# Patient Record
Sex: FEMALE | Race: WHITE | URBAN - METROPOLITAN AREA
[De-identification: names, ages, dates, MRNs, and addresses within clinical notes are randomized per-mention and may not be internally consistent; named-entity substitution may affect disease eponyms.]

---

## 2017-01-16 ENCOUNTER — HISTORICAL (OUTPATIENT)
Dept: INFUSION THERAPY | Facility: HOSPITAL | Age: 62
End: 2017-01-16

## 2017-02-02 ENCOUNTER — HISTORICAL (OUTPATIENT)
Dept: HEMATOLOGY/ONCOLOGY | Facility: CLINIC | Age: 62
End: 2017-02-02

## 2017-03-21 ENCOUNTER — HISTORICAL (OUTPATIENT)
Dept: INFUSION THERAPY | Facility: HOSPITAL | Age: 62
End: 2017-03-21

## 2017-05-05 ENCOUNTER — HISTORICAL (OUTPATIENT)
Dept: ADMINISTRATIVE | Facility: HOSPITAL | Age: 62
End: 2017-05-05

## 2017-05-05 ENCOUNTER — HISTORICAL (OUTPATIENT)
Dept: HEMATOLOGY/ONCOLOGY | Facility: CLINIC | Age: 62
End: 2017-05-05

## 2017-05-16 ENCOUNTER — HISTORICAL (OUTPATIENT)
Dept: INFUSION THERAPY | Facility: HOSPITAL | Age: 62
End: 2017-05-16

## 2017-07-18 ENCOUNTER — HISTORICAL (OUTPATIENT)
Dept: INFUSION THERAPY | Facility: HOSPITAL | Age: 62
End: 2017-07-18

## 2017-08-04 ENCOUNTER — HISTORICAL (OUTPATIENT)
Dept: HEMATOLOGY/ONCOLOGY | Facility: CLINIC | Age: 62
End: 2017-08-04

## 2017-08-08 ENCOUNTER — HISTORICAL (OUTPATIENT)
Dept: INFUSION THERAPY | Facility: HOSPITAL | Age: 62
End: 2017-08-08

## 2017-09-19 ENCOUNTER — HISTORICAL (OUTPATIENT)
Dept: INFUSION THERAPY | Facility: HOSPITAL | Age: 62
End: 2017-09-19

## 2017-11-14 ENCOUNTER — HISTORICAL (OUTPATIENT)
Dept: INFUSION THERAPY | Facility: HOSPITAL | Age: 62
End: 2017-11-14

## 2017-11-28 ENCOUNTER — HISTORICAL (OUTPATIENT)
Dept: HEMATOLOGY/ONCOLOGY | Facility: CLINIC | Age: 62
End: 2017-11-28

## 2018-01-09 ENCOUNTER — HISTORICAL (OUTPATIENT)
Dept: INFUSION THERAPY | Facility: HOSPITAL | Age: 63
End: 2018-01-09

## 2018-02-15 ENCOUNTER — HISTORICAL (OUTPATIENT)
Dept: ADMINISTRATIVE | Facility: HOSPITAL | Age: 63
End: 2018-02-15

## 2018-03-20 ENCOUNTER — HISTORICAL (OUTPATIENT)
Dept: HEMATOLOGY/ONCOLOGY | Facility: CLINIC | Age: 63
End: 2018-03-20

## 2018-03-26 ENCOUNTER — HISTORICAL (OUTPATIENT)
Dept: INFUSION THERAPY | Facility: HOSPITAL | Age: 63
End: 2018-03-26

## 2018-03-29 ENCOUNTER — HISTORICAL (OUTPATIENT)
Dept: RADIOLOGY | Facility: HOSPITAL | Age: 63
End: 2018-03-29

## 2018-04-02 ENCOUNTER — HISTORICAL (OUTPATIENT)
Dept: INFUSION THERAPY | Facility: HOSPITAL | Age: 63
End: 2018-04-02

## 2018-04-03 ENCOUNTER — HISTORICAL (OUTPATIENT)
Dept: INFUSION THERAPY | Facility: HOSPITAL | Age: 63
End: 2018-04-03

## 2018-04-05 ENCOUNTER — HISTORICAL (OUTPATIENT)
Dept: INFUSION THERAPY | Facility: HOSPITAL | Age: 63
End: 2018-04-05

## 2018-04-10 ENCOUNTER — HISTORICAL (OUTPATIENT)
Dept: HEMATOLOGY/ONCOLOGY | Facility: CLINIC | Age: 63
End: 2018-04-10

## 2018-04-17 ENCOUNTER — HISTORICAL (OUTPATIENT)
Dept: INFUSION THERAPY | Facility: HOSPITAL | Age: 63
End: 2018-04-17

## 2018-04-19 ENCOUNTER — HISTORICAL (OUTPATIENT)
Dept: INFUSION THERAPY | Facility: HOSPITAL | Age: 63
End: 2018-04-19

## 2018-04-24 ENCOUNTER — HISTORICAL (OUTPATIENT)
Dept: HEMATOLOGY/ONCOLOGY | Facility: CLINIC | Age: 63
End: 2018-04-24

## 2018-05-01 ENCOUNTER — HISTORICAL (OUTPATIENT)
Dept: INFUSION THERAPY | Facility: HOSPITAL | Age: 63
End: 2018-05-01

## 2018-05-03 ENCOUNTER — HISTORICAL (OUTPATIENT)
Dept: INFUSION THERAPY | Facility: HOSPITAL | Age: 63
End: 2018-05-03

## 2018-05-08 ENCOUNTER — HISTORICAL (OUTPATIENT)
Dept: HEMATOLOGY/ONCOLOGY | Facility: CLINIC | Age: 63
End: 2018-05-08

## 2018-05-10 ENCOUNTER — HISTORICAL (OUTPATIENT)
Dept: INFUSION THERAPY | Facility: HOSPITAL | Age: 63
End: 2018-05-10

## 2018-05-11 ENCOUNTER — HISTORICAL (OUTPATIENT)
Dept: INFUSION THERAPY | Facility: HOSPITAL | Age: 63
End: 2018-05-11

## 2018-05-22 ENCOUNTER — HISTORICAL (OUTPATIENT)
Dept: INFUSION THERAPY | Facility: HOSPITAL | Age: 63
End: 2018-05-22

## 2018-05-24 ENCOUNTER — HISTORICAL (OUTPATIENT)
Dept: INFUSION THERAPY | Facility: HOSPITAL | Age: 63
End: 2018-05-24

## 2018-05-29 ENCOUNTER — HISTORICAL (OUTPATIENT)
Dept: HEMATOLOGY/ONCOLOGY | Facility: CLINIC | Age: 63
End: 2018-05-29

## 2018-06-12 ENCOUNTER — HISTORICAL (OUTPATIENT)
Dept: INFUSION THERAPY | Facility: HOSPITAL | Age: 63
End: 2018-06-12

## 2018-06-14 ENCOUNTER — HISTORICAL (OUTPATIENT)
Dept: INFUSION THERAPY | Facility: HOSPITAL | Age: 63
End: 2018-06-14

## 2018-06-15 ENCOUNTER — HISTORICAL (OUTPATIENT)
Dept: INFUSION THERAPY | Facility: HOSPITAL | Age: 63
End: 2018-06-15

## 2018-06-19 ENCOUNTER — HISTORICAL (OUTPATIENT)
Dept: HEMATOLOGY/ONCOLOGY | Facility: CLINIC | Age: 63
End: 2018-06-19

## 2018-06-25 ENCOUNTER — HISTORICAL (OUTPATIENT)
Dept: HEMATOLOGY/ONCOLOGY | Facility: CLINIC | Age: 63
End: 2018-06-25

## 2018-07-03 ENCOUNTER — HISTORICAL (OUTPATIENT)
Dept: INFUSION THERAPY | Facility: HOSPITAL | Age: 63
End: 2018-07-03

## 2018-07-05 ENCOUNTER — HISTORICAL (OUTPATIENT)
Dept: INFUSION THERAPY | Facility: HOSPITAL | Age: 63
End: 2018-07-05

## 2018-07-10 ENCOUNTER — HISTORICAL (OUTPATIENT)
Dept: HEMATOLOGY/ONCOLOGY | Facility: CLINIC | Age: 63
End: 2018-07-10

## 2018-07-16 ENCOUNTER — HISTORICAL (OUTPATIENT)
Dept: HEMATOLOGY/ONCOLOGY | Facility: CLINIC | Age: 63
End: 2018-07-16

## 2018-07-20 ENCOUNTER — HISTORICAL (OUTPATIENT)
Dept: RADIOLOGY | Facility: HOSPITAL | Age: 63
End: 2018-07-20

## 2018-07-24 ENCOUNTER — HISTORICAL (OUTPATIENT)
Dept: INFUSION THERAPY | Facility: HOSPITAL | Age: 63
End: 2018-07-24

## 2018-07-26 ENCOUNTER — HISTORICAL (OUTPATIENT)
Dept: INFUSION THERAPY | Facility: HOSPITAL | Age: 63
End: 2018-07-26

## 2018-07-31 ENCOUNTER — HISTORICAL (OUTPATIENT)
Dept: HEMATOLOGY/ONCOLOGY | Facility: CLINIC | Age: 63
End: 2018-07-31

## 2018-08-06 ENCOUNTER — HISTORICAL (OUTPATIENT)
Dept: HEMATOLOGY/ONCOLOGY | Facility: CLINIC | Age: 63
End: 2018-08-06

## 2018-08-14 ENCOUNTER — HISTORICAL (OUTPATIENT)
Dept: INFUSION THERAPY | Facility: HOSPITAL | Age: 63
End: 2018-08-14

## 2018-08-16 ENCOUNTER — HISTORICAL (OUTPATIENT)
Dept: INFUSION THERAPY | Facility: HOSPITAL | Age: 63
End: 2018-08-16

## 2018-08-21 ENCOUNTER — HISTORICAL (OUTPATIENT)
Dept: HEMATOLOGY/ONCOLOGY | Facility: CLINIC | Age: 63
End: 2018-08-21

## 2018-08-28 ENCOUNTER — HISTORICAL (OUTPATIENT)
Dept: HEMATOLOGY/ONCOLOGY | Facility: CLINIC | Age: 63
End: 2018-08-28

## 2018-09-05 ENCOUNTER — HISTORICAL (OUTPATIENT)
Dept: INFUSION THERAPY | Facility: HOSPITAL | Age: 63
End: 2018-09-05

## 2018-09-07 ENCOUNTER — HISTORICAL (OUTPATIENT)
Dept: INFUSION THERAPY | Facility: HOSPITAL | Age: 63
End: 2018-09-07

## 2018-09-12 ENCOUNTER — HISTORICAL (OUTPATIENT)
Dept: HEMATOLOGY/ONCOLOGY | Facility: CLINIC | Age: 63
End: 2018-09-12

## 2018-09-18 ENCOUNTER — HISTORICAL (OUTPATIENT)
Dept: HEMATOLOGY/ONCOLOGY | Facility: CLINIC | Age: 63
End: 2018-09-18

## 2018-09-26 ENCOUNTER — HISTORICAL (OUTPATIENT)
Dept: INFUSION THERAPY | Facility: HOSPITAL | Age: 63
End: 2018-09-26

## 2018-09-28 ENCOUNTER — HISTORICAL (OUTPATIENT)
Dept: INFUSION THERAPY | Facility: HOSPITAL | Age: 63
End: 2018-09-28

## 2018-10-03 ENCOUNTER — HISTORICAL (OUTPATIENT)
Dept: HEMATOLOGY/ONCOLOGY | Facility: CLINIC | Age: 63
End: 2018-10-03

## 2018-10-10 ENCOUNTER — HISTORICAL (OUTPATIENT)
Dept: HEMATOLOGY/ONCOLOGY | Facility: CLINIC | Age: 63
End: 2018-10-10

## 2018-10-17 ENCOUNTER — HISTORICAL (OUTPATIENT)
Dept: HEMATOLOGY/ONCOLOGY | Facility: CLINIC | Age: 63
End: 2018-10-17

## 2018-10-24 ENCOUNTER — HISTORICAL (OUTPATIENT)
Dept: INFUSION THERAPY | Facility: HOSPITAL | Age: 63
End: 2018-10-24

## 2018-10-26 ENCOUNTER — HISTORICAL (OUTPATIENT)
Dept: INFUSION THERAPY | Facility: HOSPITAL | Age: 63
End: 2018-10-26

## 2018-10-31 ENCOUNTER — HISTORICAL (OUTPATIENT)
Dept: HEMATOLOGY/ONCOLOGY | Facility: CLINIC | Age: 63
End: 2018-10-31

## 2018-11-06 ENCOUNTER — HISTORICAL (OUTPATIENT)
Dept: HEMATOLOGY/ONCOLOGY | Facility: CLINIC | Age: 63
End: 2018-11-06

## 2018-11-14 ENCOUNTER — HISTORICAL (OUTPATIENT)
Dept: INFUSION THERAPY | Facility: HOSPITAL | Age: 63
End: 2018-11-14

## 2018-11-16 ENCOUNTER — HISTORICAL (OUTPATIENT)
Dept: INFUSION THERAPY | Facility: HOSPITAL | Age: 63
End: 2018-11-16

## 2018-11-21 ENCOUNTER — HISTORICAL (OUTPATIENT)
Dept: HEMATOLOGY/ONCOLOGY | Facility: CLINIC | Age: 63
End: 2018-11-21

## 2018-11-28 ENCOUNTER — HISTORICAL (OUTPATIENT)
Dept: HEMATOLOGY/ONCOLOGY | Facility: CLINIC | Age: 63
End: 2018-11-28

## 2018-11-30 ENCOUNTER — HISTORICAL (OUTPATIENT)
Dept: RADIOLOGY | Facility: HOSPITAL | Age: 63
End: 2018-11-30

## 2018-12-05 ENCOUNTER — HISTORICAL (OUTPATIENT)
Dept: INFUSION THERAPY | Facility: HOSPITAL | Age: 63
End: 2018-12-05

## 2018-12-07 ENCOUNTER — HISTORICAL (OUTPATIENT)
Dept: INFUSION THERAPY | Facility: HOSPITAL | Age: 63
End: 2018-12-07

## 2018-12-12 ENCOUNTER — HISTORICAL (OUTPATIENT)
Dept: HEMATOLOGY/ONCOLOGY | Facility: CLINIC | Age: 63
End: 2018-12-12

## 2018-12-19 ENCOUNTER — HISTORICAL (OUTPATIENT)
Dept: HEMATOLOGY/ONCOLOGY | Facility: CLINIC | Age: 63
End: 2018-12-19

## 2018-12-26 ENCOUNTER — HISTORICAL (OUTPATIENT)
Dept: HEMATOLOGY/ONCOLOGY | Facility: CLINIC | Age: 63
End: 2018-12-26

## 2019-01-02 ENCOUNTER — HISTORICAL (OUTPATIENT)
Dept: INFUSION THERAPY | Facility: HOSPITAL | Age: 64
End: 2019-01-02

## 2019-01-03 ENCOUNTER — HISTORICAL (OUTPATIENT)
Dept: INFUSION THERAPY | Facility: HOSPITAL | Age: 64
End: 2019-01-03

## 2019-01-05 ENCOUNTER — HISTORICAL (OUTPATIENT)
Dept: INFUSION THERAPY | Facility: HOSPITAL | Age: 64
End: 2019-01-05

## 2019-01-09 ENCOUNTER — HISTORICAL (OUTPATIENT)
Dept: HEMATOLOGY/ONCOLOGY | Facility: CLINIC | Age: 64
End: 2019-01-09

## 2019-01-17 ENCOUNTER — HISTORICAL (OUTPATIENT)
Dept: HEMATOLOGY/ONCOLOGY | Facility: CLINIC | Age: 64
End: 2019-01-17

## 2019-01-21 ENCOUNTER — HISTORICAL (OUTPATIENT)
Dept: ADMINISTRATIVE | Facility: HOSPITAL | Age: 64
End: 2019-01-21

## 2019-01-21 LAB
ABS NEUT (OLG): 6.95 X10(3)/MCL (ref 2.1–9.2)
ANION GAP SERPL CALC-SCNC: 15 MMOL/L
BASOPHILS # BLD AUTO: 0 X10(3)/MCL (ref 0–0.2)
BASOPHILS NFR BLD AUTO: 0.3 %
BUN SERPL-MCNC: 18 MG/DL (ref 8–26)
CHLORIDE SERPL-SCNC: 104 MMOL/L (ref 98–109)
CREAT SERPL-MCNC: 1 MG/DL (ref 0.6–1.3)
EOSINOPHIL # BLD AUTO: 0.1 X10(3)/MCL (ref 0–0.9)
EOSINOPHIL NFR BLD AUTO: 0.8 %
ERYTHROCYTE [DISTWIDTH] IN BLOOD BY AUTOMATED COUNT: 15 % (ref 11.5–17)
GLUCOSE SERPL-MCNC: 97 MG/DL (ref 70–105)
HCT VFR BLD AUTO: 43.4 % (ref 37–47)
HCT VFR BLD CALC: 43 % (ref 38–51)
HGB BLD-MCNC: 13.6 GM/DL (ref 12–16)
HGB BLD-MCNC: 14.6 MG/DL (ref 12–17)
LYMPHOCYTES # BLD AUTO: 2.5 X10(3)/MCL (ref 0.6–4.6)
LYMPHOCYTES NFR BLD AUTO: 24.3 %
MCH RBC QN AUTO: 32.2 PG (ref 27–31)
MCHC RBC AUTO-ENTMCNC: 31.3 GM/DL (ref 33–36)
MCV RBC AUTO: 102.8 FL (ref 80–94)
MONOCYTES # BLD AUTO: 0.7 X10(3)/MCL (ref 0.1–1.3)
MONOCYTES NFR BLD AUTO: 6.5 %
NEUTROPHILS # BLD AUTO: 7 X10(3)/MCL (ref 2.1–9.2)
NEUTROPHILS NFR BLD AUTO: 67.4 %
PLATELET # BLD AUTO: 245 X10(3)/MCL (ref 130–400)
PMV BLD AUTO: 9.3 FL (ref 9.4–12.4)
POC IONIZED CALCIUM: 1.12 MMOL/L (ref 1.12–1.32)
POC TCO2: 24 MMOL/L (ref 24–29)
POTASSIUM BLD-SCNC: 4.2 MMOL/L (ref 3.5–4.9)
RBC # BLD AUTO: 4.22 X10(6)/MCL (ref 4.2–5.4)
SODIUM BLD-SCNC: 137 MMOL/L (ref 138–146)
WBC # SPEC AUTO: 10.3 X10(3)/MCL (ref 4.5–11.5)

## 2019-01-23 ENCOUNTER — HISTORICAL (OUTPATIENT)
Dept: INFUSION THERAPY | Facility: HOSPITAL | Age: 64
End: 2019-01-23

## 2019-01-25 ENCOUNTER — HISTORICAL (OUTPATIENT)
Dept: INFUSION THERAPY | Facility: HOSPITAL | Age: 64
End: 2019-01-25

## 2019-01-30 ENCOUNTER — HISTORICAL (OUTPATIENT)
Dept: HEMATOLOGY/ONCOLOGY | Facility: CLINIC | Age: 64
End: 2019-01-30

## 2019-01-30 LAB
ABS NEUT (OLG): 3.54 X10(3)/MCL (ref 2.1–9.2)
ALBUMIN SERPL-MCNC: 3.3 GM/DL (ref 3.4–5)
ALBUMIN/GLOB SERPL: 1.1 RATIO (ref 1.1–2)
ALP SERPL-CCNC: 67 UNIT/L (ref 38–126)
ALT SERPL-CCNC: 17 UNIT/L (ref 12–78)
AST SERPL-CCNC: 16 UNIT/L (ref 15–37)
BASOPHILS # BLD AUTO: 0 X10(3)/MCL (ref 0–0.2)
BASOPHILS NFR BLD AUTO: 0.6 %
BILIRUB SERPL-MCNC: 0.3 MG/DL (ref 0.2–1)
BILIRUBIN DIRECT+TOT PNL SERPL-MCNC: 0.1 MG/DL (ref 0–0.5)
BILIRUBIN DIRECT+TOT PNL SERPL-MCNC: 0.2 MG/DL (ref 0–0.8)
BUN SERPL-MCNC: 20 MG/DL (ref 7–18)
CALCIUM SERPL-MCNC: 9 MG/DL (ref 8.5–10.1)
CHLORIDE SERPL-SCNC: 104 MMOL/L (ref 98–107)
CO2 SERPL-SCNC: 27 MMOL/L (ref 21–32)
CREAT SERPL-MCNC: 1.11 MG/DL (ref 0.55–1.02)
EOSINOPHIL # BLD AUTO: 0.1 X10(3)/MCL (ref 0–0.9)
EOSINOPHIL NFR BLD AUTO: 1.7 %
ERYTHROCYTE [DISTWIDTH] IN BLOOD BY AUTOMATED COUNT: 14.4 % (ref 11.5–17)
GLOBULIN SER-MCNC: 3.1 GM/DL (ref 2.4–3.5)
GLUCOSE SERPL-MCNC: 89 MG/DL (ref 74–106)
HCT VFR BLD AUTO: 41.4 % (ref 37–47)
HGB BLD-MCNC: 12.9 GM/DL (ref 12–16)
LYMPHOCYTES # BLD AUTO: 1.4 X10(3)/MCL (ref 0.6–4.6)
LYMPHOCYTES NFR BLD AUTO: 26.4 %
MCH RBC QN AUTO: 32.1 PG (ref 27–31)
MCHC RBC AUTO-ENTMCNC: 31.2 GM/DL (ref 33–36)
MCV RBC AUTO: 103 FL (ref 80–94)
MONOCYTES # BLD AUTO: 0.2 X10(3)/MCL (ref 0.1–1.3)
MONOCYTES NFR BLD AUTO: 4.3 %
NEUTROPHILS # BLD AUTO: 3.5 X10(3)/MCL (ref 2.1–9.2)
NEUTROPHILS NFR BLD AUTO: 66.6 %
PLATELET # BLD AUTO: 203 X10(3)/MCL (ref 130–400)
PMV BLD AUTO: 9.2 FL (ref 9.4–12.4)
POTASSIUM SERPL-SCNC: 4.1 MMOL/L (ref 3.5–5.1)
PROT SERPL-MCNC: 6.4 GM/DL (ref 6.4–8.2)
RBC # BLD AUTO: 4.02 X10(6)/MCL (ref 4.2–5.4)
SODIUM SERPL-SCNC: 140 MMOL/L (ref 136–145)
WBC # SPEC AUTO: 5.3 X10(3)/MCL (ref 4.5–11.5)

## 2019-02-04 ENCOUNTER — HISTORICAL (OUTPATIENT)
Dept: HEMATOLOGY/ONCOLOGY | Facility: CLINIC | Age: 64
End: 2019-02-04

## 2019-02-04 LAB
ABS NEUT (OLG): 3.56 X10(3)/MCL (ref 2.1–9.2)
ALBUMIN SERPL-MCNC: 3.5 GM/DL (ref 3.4–5)
ALBUMIN/GLOB SERPL: 1.1 RATIO (ref 1.1–2)
ALP SERPL-CCNC: 57 UNIT/L (ref 38–126)
ALT SERPL-CCNC: 18 UNIT/L (ref 12–78)
ANION GAP SERPL CALC-SCNC: 16 MMOL/L
AST SERPL-CCNC: 20 UNIT/L (ref 15–37)
BASOPHILS # BLD AUTO: 0 X10(3)/MCL (ref 0–0.2)
BASOPHILS NFR BLD AUTO: 0.2 %
BILIRUB SERPL-MCNC: 0.3 MG/DL (ref 0.2–1)
BILIRUBIN DIRECT+TOT PNL SERPL-MCNC: 0.1 MG/DL (ref 0–0.5)
BILIRUBIN DIRECT+TOT PNL SERPL-MCNC: 0.2 MG/DL (ref 0–0.8)
BUN SERPL-MCNC: 18 MG/DL (ref 7–18)
BUN SERPL-MCNC: 19 MG/DL (ref 8–26)
CALCIUM SERPL-MCNC: 8.8 MG/DL (ref 8.5–10.1)
CEA SERPL-MCNC: 5.1 NG/ML (ref 0–3)
CHLORIDE SERPL-SCNC: 102 MMOL/L (ref 98–109)
CHLORIDE SERPL-SCNC: 105 MMOL/L (ref 98–107)
CO2 SERPL-SCNC: 28 MMOL/L (ref 21–32)
CREAT SERPL-MCNC: 1.1 MG/DL (ref 0.6–1.3)
CREAT SERPL-MCNC: 1.14 MG/DL (ref 0.55–1.02)
EOSINOPHIL # BLD AUTO: 0.1 X10(3)/MCL (ref 0–0.9)
EOSINOPHIL NFR BLD AUTO: 1.2 %
ERYTHROCYTE [DISTWIDTH] IN BLOOD BY AUTOMATED COUNT: 15.2 % (ref 11.5–17)
GLOBULIN SER-MCNC: 3.1 GM/DL (ref 2.4–3.5)
GLUCOSE SERPL-MCNC: 110 MG/DL (ref 74–106)
GLUCOSE SERPL-MCNC: 114 MG/DL (ref 70–105)
HCT VFR BLD AUTO: 39.6 % (ref 37–47)
HCT VFR BLD CALC: 38 % (ref 38–51)
HGB BLD-MCNC: 12.3 GM/DL (ref 12–16)
HGB BLD-MCNC: 12.9 MG/DL (ref 12–17)
LYMPHOCYTES # BLD AUTO: 1.8 X10(3)/MCL (ref 0.6–4.6)
LYMPHOCYTES NFR BLD AUTO: 31 %
MCH RBC QN AUTO: 31.8 PG (ref 27–31)
MCHC RBC AUTO-ENTMCNC: 31.1 GM/DL (ref 33–36)
MCV RBC AUTO: 102.3 FL (ref 80–94)
MONOCYTES # BLD AUTO: 0.3 X10(3)/MCL (ref 0.1–1.3)
MONOCYTES NFR BLD AUTO: 5.7 %
NEUTROPHILS # BLD AUTO: 3.6 X10(3)/MCL (ref 2.1–9.2)
NEUTROPHILS NFR BLD AUTO: 61.9 %
PLATELET # BLD AUTO: 273 X10(3)/MCL (ref 130–400)
PMV BLD AUTO: 8.9 FL (ref 9.4–12.4)
POC IONIZED CALCIUM: 1.22 MMOL/L (ref 1.12–1.32)
POC TCO2: 27 MMOL/L (ref 24–29)
POTASSIUM BLD-SCNC: 4.1 MMOL/L (ref 3.5–4.9)
POTASSIUM SERPL-SCNC: 4.2 MMOL/L (ref 3.5–5.1)
PROT SERPL-MCNC: 6.6 GM/DL (ref 6.4–8.2)
RBC # BLD AUTO: 3.87 X10(6)/MCL (ref 4.2–5.4)
SODIUM BLD-SCNC: 140 MMOL/L (ref 138–146)
SODIUM SERPL-SCNC: 140 MMOL/L (ref 136–145)
WBC # SPEC AUTO: 5.8 X10(3)/MCL (ref 4.5–11.5)

## 2019-02-11 ENCOUNTER — HISTORICAL (OUTPATIENT)
Dept: ADMINISTRATIVE | Facility: HOSPITAL | Age: 64
End: 2019-02-11

## 2019-02-11 LAB
ABS NEUT (OLG): 2.12 X10(3)/MCL (ref 2.1–9.2)
ANION GAP SERPL CALC-SCNC: 13 MMOL/L
BASOPHILS # BLD AUTO: 0 X10(3)/MCL (ref 0–0.2)
BASOPHILS NFR BLD AUTO: 0.7 %
BUN SERPL-MCNC: 21 MG/DL (ref 8–26)
CHLORIDE SERPL-SCNC: 103 MMOL/L (ref 98–109)
CREAT SERPL-MCNC: 1.1 MG/DL (ref 0.6–1.3)
EOSINOPHIL # BLD AUTO: 0.1 X10(3)/MCL (ref 0–0.9)
EOSINOPHIL NFR BLD AUTO: 1.9 %
ERYTHROCYTE [DISTWIDTH] IN BLOOD BY AUTOMATED COUNT: 15.2 % (ref 11.5–17)
GLUCOSE SERPL-MCNC: 96 MG/DL (ref 70–105)
HCT VFR BLD AUTO: 40.8 % (ref 37–47)
HCT VFR BLD CALC: 40 % (ref 38–51)
HGB BLD-MCNC: 12.9 GM/DL (ref 12–16)
HGB BLD-MCNC: 13.6 MG/DL (ref 12–17)
LYMPHOCYTES # BLD AUTO: 1.4 X10(3)/MCL (ref 0.6–4.6)
LYMPHOCYTES NFR BLD AUTO: 34.2 %
MCH RBC QN AUTO: 32.3 PG (ref 27–31)
MCHC RBC AUTO-ENTMCNC: 31.6 GM/DL (ref 33–36)
MCV RBC AUTO: 102 FL (ref 80–94)
MONOCYTES # BLD AUTO: 0.5 X10(3)/MCL (ref 0.1–1.3)
MONOCYTES NFR BLD AUTO: 12 %
NEUTROPHILS # BLD AUTO: 2.1 X10(3)/MCL (ref 2.1–9.2)
NEUTROPHILS NFR BLD AUTO: 51.2 %
PLATELET # BLD AUTO: 256 X10(3)/MCL (ref 130–400)
PMV BLD AUTO: 9 FL (ref 9.4–12.4)
POC IONIZED CALCIUM: 1.18 MMOL/L (ref 1.12–1.32)
POC TCO2: 27 MMOL/L (ref 24–29)
POTASSIUM BLD-SCNC: 4.5 MMOL/L (ref 3.5–4.9)
RBC # BLD AUTO: 4 X10(6)/MCL (ref 4.2–5.4)
SODIUM BLD-SCNC: 138 MMOL/L (ref 138–146)
WBC # SPEC AUTO: 4.2 X10(3)/MCL (ref 4.5–11.5)

## 2019-02-13 ENCOUNTER — HISTORICAL (OUTPATIENT)
Dept: INFUSION THERAPY | Facility: HOSPITAL | Age: 64
End: 2019-02-13

## 2019-02-15 ENCOUNTER — HISTORICAL (OUTPATIENT)
Dept: INFUSION THERAPY | Facility: HOSPITAL | Age: 64
End: 2019-02-15

## 2019-02-20 ENCOUNTER — HISTORICAL (OUTPATIENT)
Dept: HEMATOLOGY/ONCOLOGY | Facility: CLINIC | Age: 64
End: 2019-02-20

## 2019-02-20 LAB
ABS NEUT (OLG): 9.52 X10(3)/MCL (ref 2.1–9.2)
ALBUMIN SERPL-MCNC: 3.3 GM/DL (ref 3.4–5)
ALBUMIN/GLOB SERPL: 1.1 RATIO (ref 1.1–2)
ALP SERPL-CCNC: 88 UNIT/L (ref 38–126)
ALT SERPL-CCNC: 29 UNIT/L (ref 12–78)
AST SERPL-CCNC: 15 UNIT/L (ref 15–37)
BASOPHILS # BLD AUTO: 0 X10(3)/MCL (ref 0–0.2)
BASOPHILS NFR BLD AUTO: 0.2 %
BILIRUB SERPL-MCNC: 0.4 MG/DL (ref 0.2–1)
BILIRUBIN DIRECT+TOT PNL SERPL-MCNC: 0.1 MG/DL (ref 0–0.5)
BILIRUBIN DIRECT+TOT PNL SERPL-MCNC: 0.3 MG/DL (ref 0–0.8)
BUN SERPL-MCNC: 19 MG/DL (ref 7–18)
CALCIUM SERPL-MCNC: 8.7 MG/DL (ref 8.5–10.1)
CHLORIDE SERPL-SCNC: 106 MMOL/L (ref 98–107)
CO2 SERPL-SCNC: 27 MMOL/L (ref 21–32)
CREAT SERPL-MCNC: 0.95 MG/DL (ref 0.55–1.02)
EOSINOPHIL # BLD AUTO: 0.2 X10(3)/MCL (ref 0–0.9)
EOSINOPHIL NFR BLD AUTO: 1.3 %
ERYTHROCYTE [DISTWIDTH] IN BLOOD BY AUTOMATED COUNT: 15.1 % (ref 11.5–17)
GLOBULIN SER-MCNC: 3 GM/DL (ref 2.4–3.5)
GLUCOSE SERPL-MCNC: 91 MG/DL (ref 74–106)
HCT VFR BLD AUTO: 39.7 % (ref 37–47)
HGB BLD-MCNC: 12.4 GM/DL (ref 12–16)
LYMPHOCYTES # BLD AUTO: 1.8 X10(3)/MCL (ref 0.6–4.6)
LYMPHOCYTES NFR BLD AUTO: 14.2 %
MCH RBC QN AUTO: 32.2 PG (ref 27–31)
MCHC RBC AUTO-ENTMCNC: 31.2 GM/DL (ref 33–36)
MCV RBC AUTO: 103.1 FL (ref 80–94)
MONOCYTES # BLD AUTO: 0.8 X10(3)/MCL (ref 0.1–1.3)
MONOCYTES NFR BLD AUTO: 6.2 %
NEUTROPHILS # BLD AUTO: 9.5 X10(3)/MCL (ref 2.1–9.2)
NEUTROPHILS NFR BLD AUTO: 77.2 %
PLATELET # BLD AUTO: 185 X10(3)/MCL (ref 130–400)
PMV BLD AUTO: 9.3 FL (ref 9.4–12.4)
POTASSIUM SERPL-SCNC: 4.2 MMOL/L (ref 3.5–5.1)
PROT SERPL-MCNC: 6.3 GM/DL (ref 6.4–8.2)
RBC # BLD AUTO: 3.85 X10(6)/MCL (ref 4.2–5.4)
SODIUM SERPL-SCNC: 140 MMOL/L (ref 136–145)
WBC # SPEC AUTO: 12.3 X10(3)/MCL (ref 4.5–11.5)

## 2019-02-26 ENCOUNTER — HISTORICAL (OUTPATIENT)
Dept: HEMATOLOGY/ONCOLOGY | Facility: CLINIC | Age: 64
End: 2019-02-26

## 2019-02-26 LAB
ABS NEUT (OLG): 7.59 X10(3)/MCL (ref 2.1–9.2)
ALBUMIN SERPL-MCNC: 3.4 GM/DL (ref 3.4–5)
ALBUMIN/GLOB SERPL: 1 RATIO (ref 1.1–2)
ALP SERPL-CCNC: 86 UNIT/L (ref 38–126)
ALT SERPL-CCNC: 21 UNIT/L (ref 12–78)
AST SERPL-CCNC: 20 UNIT/L (ref 15–37)
BASOPHILS # BLD AUTO: 0 X10(3)/MCL (ref 0–0.2)
BASOPHILS NFR BLD AUTO: 0.4 %
BILIRUB SERPL-MCNC: 0.2 MG/DL (ref 0.2–1)
BILIRUBIN DIRECT+TOT PNL SERPL-MCNC: 0.1 MG/DL (ref 0–0.5)
BILIRUBIN DIRECT+TOT PNL SERPL-MCNC: 0.1 MG/DL (ref 0–0.8)
BUN SERPL-MCNC: 21 MG/DL (ref 7–18)
CALCIUM SERPL-MCNC: 9 MG/DL (ref 8.5–10.1)
CEA SERPL-MCNC: 5.4 NG/ML (ref 0–3)
CHLORIDE SERPL-SCNC: 105 MMOL/L (ref 98–107)
CO2 SERPL-SCNC: 26 MMOL/L (ref 21–32)
CREAT SERPL-MCNC: 1.08 MG/DL (ref 0.55–1.02)
EOSINOPHIL # BLD AUTO: 0.1 X10(3)/MCL (ref 0–0.9)
EOSINOPHIL NFR BLD AUTO: 0.9 %
ERYTHROCYTE [DISTWIDTH] IN BLOOD BY AUTOMATED COUNT: 15.5 % (ref 11.5–17)
GLOBULIN SER-MCNC: 3.3 GM/DL (ref 2.4–3.5)
GLUCOSE SERPL-MCNC: 85 MG/DL (ref 74–106)
HCT VFR BLD AUTO: 40.3 % (ref 37–47)
HGB BLD-MCNC: 12.5 GM/DL (ref 12–16)
LYMPHOCYTES # BLD AUTO: 2 X10(3)/MCL (ref 0.6–4.6)
LYMPHOCYTES NFR BLD AUTO: 19.2 %
MCH RBC QN AUTO: 32.1 PG (ref 27–31)
MCHC RBC AUTO-ENTMCNC: 31 GM/DL (ref 33–36)
MCV RBC AUTO: 103.6 FL (ref 80–94)
MONOCYTES # BLD AUTO: 0.6 X10(3)/MCL (ref 0.1–1.3)
MONOCYTES NFR BLD AUTO: 6.1 %
NEUTROPHILS # BLD AUTO: 7.6 X10(3)/MCL (ref 2.1–9.2)
NEUTROPHILS NFR BLD AUTO: 72.9 %
PLATELET # BLD AUTO: 222 X10(3)/MCL (ref 130–400)
PMV BLD AUTO: 9.4 FL (ref 9.4–12.4)
POTASSIUM SERPL-SCNC: 4.6 MMOL/L (ref 3.5–5.1)
PROT SERPL-MCNC: 6.7 GM/DL (ref 6.4–8.2)
RBC # BLD AUTO: 3.89 X10(6)/MCL (ref 4.2–5.4)
SODIUM SERPL-SCNC: 139 MMOL/L (ref 136–145)
WBC # SPEC AUTO: 10.4 X10(3)/MCL (ref 4.5–11.5)

## 2019-03-04 ENCOUNTER — HISTORICAL (OUTPATIENT)
Dept: ADMINISTRATIVE | Facility: HOSPITAL | Age: 64
End: 2019-03-04

## 2019-03-04 LAB
ABS NEUT (OLG): 7.21 X10(3)/MCL (ref 2.1–9.2)
ANION GAP SERPL CALC-SCNC: 16 MMOL/L
BASOPHILS # BLD AUTO: 0 X10(3)/MCL (ref 0–0.2)
BASOPHILS NFR BLD AUTO: 0.3 %
BUN SERPL-MCNC: 21 MG/DL (ref 8–26)
CEA SERPL-MCNC: 6.4 NG/ML (ref 0–3)
CHLORIDE SERPL-SCNC: 104 MMOL/L (ref 98–109)
CREAT SERPL-MCNC: 1 MG/DL (ref 0.6–1.3)
EOSINOPHIL # BLD AUTO: 0.1 X10(3)/MCL (ref 0–0.9)
EOSINOPHIL NFR BLD AUTO: 1 %
ERYTHROCYTE [DISTWIDTH] IN BLOOD BY AUTOMATED COUNT: 15.7 % (ref 11.5–17)
GLUCOSE SERPL-MCNC: 91 MG/DL (ref 70–105)
HCT VFR BLD AUTO: 41.4 % (ref 37–47)
HCT VFR BLD CALC: 39 % (ref 38–51)
HGB BLD-MCNC: 13.1 GM/DL (ref 12–16)
HGB BLD-MCNC: 13.3 MG/DL (ref 12–17)
LYMPHOCYTES # BLD AUTO: 2 X10(3)/MCL (ref 0.6–4.6)
LYMPHOCYTES NFR BLD AUTO: 20.1 %
MCH RBC QN AUTO: 32.3 PG (ref 27–31)
MCHC RBC AUTO-ENTMCNC: 31.6 GM/DL (ref 33–36)
MCV RBC AUTO: 102.2 FL (ref 80–94)
MONOCYTES # BLD AUTO: 0.6 X10(3)/MCL (ref 0.1–1.3)
MONOCYTES NFR BLD AUTO: 6.3 %
NEUTROPHILS # BLD AUTO: 7.2 X10(3)/MCL (ref 2.1–9.2)
NEUTROPHILS NFR BLD AUTO: 71.5 %
PLATELET # BLD AUTO: 240 X10(3)/MCL (ref 130–400)
PMV BLD AUTO: 9.2 FL (ref 9.4–12.4)
POC IONIZED CALCIUM: 1.17 MMOL/L (ref 1.12–1.32)
POC TCO2: 23 MMOL/L (ref 24–29)
POTASSIUM BLD-SCNC: 4.5 MMOL/L (ref 3.5–4.9)
RBC # BLD AUTO: 4.05 X10(6)/MCL (ref 4.2–5.4)
SODIUM BLD-SCNC: 138 MMOL/L (ref 138–146)
WBC # SPEC AUTO: 10.1 X10(3)/MCL (ref 4.5–11.5)

## 2019-03-06 ENCOUNTER — HISTORICAL (OUTPATIENT)
Dept: INFUSION THERAPY | Facility: HOSPITAL | Age: 64
End: 2019-03-06

## 2019-03-08 ENCOUNTER — HISTORICAL (OUTPATIENT)
Dept: INFUSION THERAPY | Facility: HOSPITAL | Age: 64
End: 2019-03-08

## 2019-03-09 ENCOUNTER — HISTORICAL (OUTPATIENT)
Dept: HEMATOLOGY/ONCOLOGY | Facility: CLINIC | Age: 64
End: 2019-03-09

## 2019-03-13 ENCOUNTER — HISTORICAL (OUTPATIENT)
Dept: HEMATOLOGY/ONCOLOGY | Facility: CLINIC | Age: 64
End: 2019-03-13

## 2019-03-13 LAB
ABS NEUT (OLG): 14.53 X10(3)/MCL (ref 2.1–9.2)
ALBUMIN SERPL-MCNC: 3.4 GM/DL (ref 3.4–5)
ALBUMIN/GLOB SERPL: 1.1 RATIO (ref 1.1–2)
ALP SERPL-CCNC: 110 UNIT/L (ref 38–126)
ALT SERPL-CCNC: 30 UNIT/L (ref 12–78)
AST SERPL-CCNC: 21 UNIT/L (ref 15–37)
BASOPHILS # BLD AUTO: 0 X10(3)/MCL (ref 0–0.2)
BASOPHILS NFR BLD AUTO: 0.1 %
BILIRUB SERPL-MCNC: 0.2 MG/DL (ref 0.2–1)
BILIRUBIN DIRECT+TOT PNL SERPL-MCNC: 0.1 MG/DL (ref 0–0.5)
BILIRUBIN DIRECT+TOT PNL SERPL-MCNC: 0.1 MG/DL (ref 0–0.8)
BUN SERPL-MCNC: 22 MG/DL (ref 7–18)
CALCIUM SERPL-MCNC: 8.7 MG/DL (ref 8.5–10.1)
CHLORIDE SERPL-SCNC: 104 MMOL/L (ref 98–107)
CO2 SERPL-SCNC: 28 MMOL/L (ref 21–32)
CREAT SERPL-MCNC: 0.96 MG/DL (ref 0.55–1.02)
EOSINOPHIL # BLD AUTO: 0.2 X10(3)/MCL (ref 0–0.9)
EOSINOPHIL NFR BLD AUTO: 0.9 %
ERYTHROCYTE [DISTWIDTH] IN BLOOD BY AUTOMATED COUNT: 15.8 % (ref 11.5–17)
GLOBULIN SER-MCNC: 3.1 GM/DL (ref 2.4–3.5)
GLUCOSE SERPL-MCNC: 90 MG/DL (ref 74–106)
HCT VFR BLD AUTO: 39.8 % (ref 37–47)
HGB BLD-MCNC: 12.5 GM/DL (ref 12–16)
LYMPHOCYTES # BLD AUTO: 1.9 X10(3)/MCL (ref 0.6–4.6)
LYMPHOCYTES NFR BLD AUTO: 11 %
MCH RBC QN AUTO: 32.3 PG (ref 27–31)
MCHC RBC AUTO-ENTMCNC: 31.4 GM/DL (ref 33–36)
MCV RBC AUTO: 102.8 FL (ref 80–94)
MONOCYTES # BLD AUTO: 0.6 X10(3)/MCL (ref 0.1–1.3)
MONOCYTES NFR BLD AUTO: 3.4 %
NEUTROPHILS # BLD AUTO: 14.5 X10(3)/MCL (ref 2.1–9.2)
NEUTROPHILS NFR BLD AUTO: 84.3 %
PLATELET # BLD AUTO: 211 X10(3)/MCL (ref 130–400)
PMV BLD AUTO: 9.7 FL (ref 9.4–12.4)
POTASSIUM SERPL-SCNC: 4.2 MMOL/L (ref 3.5–5.1)
PROT SERPL-MCNC: 6.5 GM/DL (ref 6.4–8.2)
RBC # BLD AUTO: 3.87 X10(6)/MCL (ref 4.2–5.4)
SODIUM SERPL-SCNC: 138 MMOL/L (ref 136–145)
WBC # SPEC AUTO: 17.2 X10(3)/MCL (ref 4.5–11.5)

## 2019-03-22 ENCOUNTER — HISTORICAL (OUTPATIENT)
Dept: HEMATOLOGY/ONCOLOGY | Facility: CLINIC | Age: 64
End: 2019-03-22

## 2019-03-22 LAB
ABS NEUT (OLG): 6.21 X10(3)/MCL (ref 2.1–9.2)
ANION GAP SERPL CALC-SCNC: 17 MMOL/L
BASOPHILS # BLD AUTO: 0 X10(3)/MCL (ref 0–0.2)
BASOPHILS NFR BLD AUTO: 0.2 %
BUN SERPL-MCNC: 21 MG/DL (ref 8–26)
CHLORIDE SERPL-SCNC: 99 MMOL/L (ref 98–109)
CREAT SERPL-MCNC: 1 MG/DL (ref 0.6–1.3)
EOSINOPHIL # BLD AUTO: 0.1 X10(3)/MCL (ref 0–0.9)
EOSINOPHIL NFR BLD AUTO: 1.6 %
ERYTHROCYTE [DISTWIDTH] IN BLOOD BY AUTOMATED COUNT: 16.2 % (ref 11.5–17)
GLUCOSE SERPL-MCNC: 90 MG/DL (ref 70–105)
HCT VFR BLD AUTO: 42.2 % (ref 37–47)
HCT VFR BLD CALC: 41 % (ref 38–51)
HGB BLD-MCNC: 13.2 GM/DL (ref 12–16)
HGB BLD-MCNC: 13.9 MG/DL (ref 12–17)
LYMPHOCYTES # BLD AUTO: 1.7 X10(3)/MCL (ref 0.6–4.6)
LYMPHOCYTES NFR BLD AUTO: 19.7 %
MCH RBC QN AUTO: 32.2 PG (ref 27–31)
MCHC RBC AUTO-ENTMCNC: 31.3 GM/DL (ref 33–36)
MCV RBC AUTO: 102.9 FL (ref 80–94)
MONOCYTES # BLD AUTO: 0.5 X10(3)/MCL (ref 0.1–1.3)
MONOCYTES NFR BLD AUTO: 6.3 %
NEUTROPHILS # BLD AUTO: 6.2 X10(3)/MCL (ref 2.1–9.2)
NEUTROPHILS NFR BLD AUTO: 72 %
PLATELET # BLD AUTO: 262 X10(3)/MCL (ref 130–400)
PMV BLD AUTO: 9 FL (ref 9.4–12.4)
POC IONIZED CALCIUM: 1.17 MMOL/L (ref 1.12–1.32)
POC TCO2: 26 MMOL/L (ref 24–29)
POTASSIUM BLD-SCNC: 4.1 MMOL/L (ref 3.5–4.9)
RBC # BLD AUTO: 4.1 X10(6)/MCL (ref 4.2–5.4)
SODIUM BLD-SCNC: 137 MMOL/L (ref 138–146)
WBC # SPEC AUTO: 8.6 X10(3)/MCL (ref 4.5–11.5)

## 2019-03-26 ENCOUNTER — HISTORICAL (OUTPATIENT)
Dept: INFUSION THERAPY | Facility: HOSPITAL | Age: 64
End: 2019-03-26

## 2019-03-28 ENCOUNTER — HISTORICAL (OUTPATIENT)
Dept: INFUSION THERAPY | Facility: HOSPITAL | Age: 64
End: 2019-03-28

## 2019-03-29 ENCOUNTER — HISTORICAL (OUTPATIENT)
Dept: INFUSION THERAPY | Facility: HOSPITAL | Age: 64
End: 2019-03-29

## 2019-04-02 ENCOUNTER — HISTORICAL (OUTPATIENT)
Dept: HEMATOLOGY/ONCOLOGY | Facility: CLINIC | Age: 64
End: 2019-04-02

## 2019-04-02 LAB
ABS NEUT (OLG): 17.85 X10(3)/MCL (ref 2.1–9.2)
ALBUMIN SERPL-MCNC: 3.4 GM/DL (ref 3.4–5)
ALBUMIN/GLOB SERPL: 1.2 {RATIO}
ALP SERPL-CCNC: 110 UNIT/L (ref 38–126)
ALT SERPL-CCNC: 23 UNIT/L (ref 12–78)
AST SERPL-CCNC: 25 UNIT/L (ref 15–37)
BASOPHILS # BLD AUTO: 0 X10(3)/MCL (ref 0–0.2)
BASOPHILS NFR BLD AUTO: 0.1 %
BILIRUB SERPL-MCNC: 0.4 MG/DL (ref 0.2–1)
BILIRUBIN DIRECT+TOT PNL SERPL-MCNC: 0.1 MG/DL (ref 0–0.2)
BILIRUBIN DIRECT+TOT PNL SERPL-MCNC: 0.3 MG/DL (ref 0–0.8)
BUN SERPL-MCNC: 21 MG/DL (ref 7–18)
CALCIUM SERPL-MCNC: 8.7 MG/DL (ref 8.5–10.1)
CHLORIDE SERPL-SCNC: 102 MMOL/L (ref 98–107)
CO2 SERPL-SCNC: 27 MMOL/L (ref 21–32)
CREAT SERPL-MCNC: 1.03 MG/DL (ref 0.55–1.02)
EOSINOPHIL # BLD AUTO: 0.1 X10(3)/MCL (ref 0–0.9)
EOSINOPHIL NFR BLD AUTO: 0.3 %
ERYTHROCYTE [DISTWIDTH] IN BLOOD BY AUTOMATED COUNT: 16 % (ref 11.5–17)
GLOBULIN SER-MCNC: 2.9 GM/DL (ref 2.4–3.5)
GLUCOSE SERPL-MCNC: 102 MG/DL (ref 74–106)
HCT VFR BLD AUTO: 41.2 % (ref 37–47)
HGB BLD-MCNC: 13.3 GM/DL (ref 12–16)
LYMPHOCYTES # BLD AUTO: 1.5 X10(3)/MCL (ref 0.6–4.6)
LYMPHOCYTES NFR BLD AUTO: 7.5 %
MCH RBC QN AUTO: 32.6 PG (ref 27–31)
MCHC RBC AUTO-ENTMCNC: 32.3 GM/DL (ref 33–36)
MCV RBC AUTO: 101 FL (ref 80–94)
MONOCYTES # BLD AUTO: 0.5 X10(3)/MCL (ref 0.1–1.3)
MONOCYTES NFR BLD AUTO: 2.7 %
NEUTROPHILS # BLD AUTO: 17.8 X10(3)/MCL (ref 2.1–9.2)
NEUTROPHILS NFR BLD AUTO: 89 %
PLATELET # BLD AUTO: 220 X10(3)/MCL (ref 130–400)
PMV BLD AUTO: 9.4 FL (ref 9.4–12.4)
POTASSIUM SERPL-SCNC: 4.2 MMOL/L (ref 3.5–5.1)
PROT SERPL-MCNC: 6.3 GM/DL (ref 6.4–8.2)
RBC # BLD AUTO: 4.08 X10(6)/MCL (ref 4.2–5.4)
SODIUM SERPL-SCNC: 138 MMOL/L (ref 136–145)
WBC # SPEC AUTO: 20.1 X10(3)/MCL (ref 4.5–11.5)

## 2019-04-09 ENCOUNTER — HISTORICAL (OUTPATIENT)
Dept: HEMATOLOGY/ONCOLOGY | Facility: CLINIC | Age: 64
End: 2019-04-09

## 2019-04-09 LAB
ABS NEUT (OLG): 6.63 X10(3)/MCL (ref 2.1–9.2)
ALBUMIN SERPL-MCNC: 3.5 GM/DL (ref 3.4–5)
ALBUMIN/GLOB SERPL: 1.2 RATIO (ref 1.1–2)
ALP SERPL-CCNC: 79 UNIT/L (ref 38–126)
ALT SERPL-CCNC: 17 UNIT/L (ref 12–78)
AST SERPL-CCNC: 14 UNIT/L (ref 15–37)
BASOPHILS # BLD AUTO: 0 X10(3)/MCL (ref 0–0.2)
BASOPHILS NFR BLD AUTO: 0.2 %
BILIRUB SERPL-MCNC: 0.2 MG/DL (ref 0.2–1)
BILIRUBIN DIRECT+TOT PNL SERPL-MCNC: 0.1 MG/DL (ref 0–0.5)
BILIRUBIN DIRECT+TOT PNL SERPL-MCNC: 0.1 MG/DL (ref 0–0.8)
BUN SERPL-MCNC: 20 MG/DL (ref 7–18)
CALCIUM SERPL-MCNC: 9.1 MG/DL (ref 8.5–10.1)
CEA SERPL-MCNC: 4.2 NG/ML (ref 0–3)
CHLORIDE SERPL-SCNC: 106 MMOL/L (ref 98–107)
CO2 SERPL-SCNC: 27 MMOL/L (ref 21–32)
CREAT SERPL-MCNC: 1.06 MG/DL (ref 0.55–1.02)
EOSINOPHIL # BLD AUTO: 0.2 X10(3)/MCL (ref 0–0.9)
EOSINOPHIL NFR BLD AUTO: 2.3 %
ERYTHROCYTE [DISTWIDTH] IN BLOOD BY AUTOMATED COUNT: 16.8 % (ref 11.5–17)
GLOBULIN SER-MCNC: 2.9 GM/DL (ref 2.4–3.5)
GLUCOSE SERPL-MCNC: 86 MG/DL (ref 74–106)
HCT VFR BLD AUTO: 40.5 % (ref 37–47)
HGB BLD-MCNC: 12.8 GM/DL (ref 12–16)
LYMPHOCYTES # BLD AUTO: 1.8 X10(3)/MCL (ref 0.6–4.6)
LYMPHOCYTES NFR BLD AUTO: 19.2 %
MCH RBC QN AUTO: 32.2 PG (ref 27–31)
MCHC RBC AUTO-ENTMCNC: 31.6 GM/DL (ref 33–36)
MCV RBC AUTO: 102 FL (ref 80–94)
MONOCYTES # BLD AUTO: 0.6 X10(3)/MCL (ref 0.1–1.3)
MONOCYTES NFR BLD AUTO: 6.4 %
NEUTROPHILS # BLD AUTO: 6.6 X10(3)/MCL (ref 2.1–9.2)
NEUTROPHILS NFR BLD AUTO: 71.4 %
PLATELET # BLD AUTO: 269 X10(3)/MCL (ref 130–400)
PMV BLD AUTO: 9.1 FL (ref 9.4–12.4)
POTASSIUM SERPL-SCNC: 4.7 MMOL/L (ref 3.5–5.1)
PROT SERPL-MCNC: 6.4 GM/DL (ref 6.4–8.2)
RBC # BLD AUTO: 3.97 X10(6)/MCL (ref 4.2–5.4)
SODIUM SERPL-SCNC: 140 MMOL/L (ref 136–145)
WBC # SPEC AUTO: 9.3 X10(3)/MCL (ref 4.5–11.5)

## 2019-04-15 ENCOUNTER — HISTORICAL (OUTPATIENT)
Dept: ADMINISTRATIVE | Facility: HOSPITAL | Age: 64
End: 2019-04-15

## 2019-04-15 LAB
ABS NEUT (OLG): 6.65 X10(3)/MCL (ref 2.1–9.2)
ANION GAP SERPL CALC-SCNC: 16 MMOL/L
BASOPHILS # BLD AUTO: 0 X10(3)/MCL (ref 0–0.2)
BASOPHILS NFR BLD AUTO: 0.3 %
BUN SERPL-MCNC: 19 MG/DL (ref 8–26)
CHLORIDE SERPL-SCNC: 101 MMOL/L (ref 98–109)
CREAT SERPL-MCNC: 1 MG/DL (ref 0.6–1.3)
EOSINOPHIL # BLD AUTO: 0.1 X10(3)/MCL (ref 0–0.9)
EOSINOPHIL NFR BLD AUTO: 1.2 %
ERYTHROCYTE [DISTWIDTH] IN BLOOD BY AUTOMATED COUNT: 16.6 % (ref 11.5–17)
GLUCOSE SERPL-MCNC: 95 MG/DL (ref 70–105)
HCT VFR BLD AUTO: 42.5 % (ref 37–47)
HCT VFR BLD CALC: 42 % (ref 38–51)
HGB BLD-MCNC: 13.5 GM/DL (ref 12–16)
HGB BLD-MCNC: 14.3 MG/DL (ref 12–17)
LYMPHOCYTES # BLD AUTO: 2.2 X10(3)/MCL (ref 0.6–4.6)
LYMPHOCYTES NFR BLD AUTO: 22.8 %
MCH RBC QN AUTO: 32.4 PG (ref 27–31)
MCHC RBC AUTO-ENTMCNC: 31.8 GM/DL (ref 33–36)
MCV RBC AUTO: 101.9 FL (ref 80–94)
MONOCYTES # BLD AUTO: 0.6 X10(3)/MCL (ref 0.1–1.3)
MONOCYTES NFR BLD AUTO: 6.4 %
NEUTROPHILS # BLD AUTO: 6.6 X10(3)/MCL (ref 2.1–9.2)
NEUTROPHILS NFR BLD AUTO: 69.1 %
PLATELET # BLD AUTO: 261 X10(3)/MCL (ref 130–400)
PMV BLD AUTO: 9 FL (ref 9.4–12.4)
POC IONIZED CALCIUM: 1.18 MMOL/L (ref 1.12–1.32)
POC TCO2: 26 MMOL/L (ref 24–29)
POTASSIUM BLD-SCNC: 4 MMOL/L (ref 3.5–4.9)
RBC # BLD AUTO: 4.17 X10(6)/MCL (ref 4.2–5.4)
SODIUM BLD-SCNC: 138 MMOL/L (ref 138–146)
WBC # SPEC AUTO: 9.6 X10(3)/MCL (ref 4.5–11.5)

## 2019-04-16 ENCOUNTER — HISTORICAL (OUTPATIENT)
Dept: ADMINISTRATIVE | Facility: HOSPITAL | Age: 64
End: 2019-04-16

## 2019-04-16 ENCOUNTER — HISTORICAL (OUTPATIENT)
Dept: INFUSION THERAPY | Facility: HOSPITAL | Age: 64
End: 2019-04-16

## 2019-04-16 LAB
ALBUMIN SERPL-MCNC: 3.4 GM/DL (ref 3.4–5)
ALBUMIN/GLOB SERPL: 1.1 {RATIO}
ALP SERPL-CCNC: 77 UNIT/L (ref 38–126)
ALT SERPL-CCNC: 23 UNIT/L (ref 12–78)
AST SERPL-CCNC: 23 UNIT/L (ref 15–37)
BILIRUB SERPL-MCNC: 0.3 MG/DL (ref 0.2–1)
BILIRUBIN DIRECT+TOT PNL SERPL-MCNC: 0.1 MG/DL (ref 0–0.2)
BILIRUBIN DIRECT+TOT PNL SERPL-MCNC: 0.2 MG/DL (ref 0–0.8)
BUN SERPL-MCNC: 19 MG/DL (ref 7–18)
CALCIUM SERPL-MCNC: 8.4 MG/DL (ref 8.5–10.1)
CHLORIDE SERPL-SCNC: 107 MMOL/L (ref 98–107)
CHOLEST SERPL-MCNC: 193 MG/DL (ref 0–200)
CHOLEST/HDLC SERPL: 3.9 {RATIO} (ref 0–4)
CO2 SERPL-SCNC: 25 MMOL/L (ref 21–32)
CREAT SERPL-MCNC: 1.03 MG/DL (ref 0.55–1.02)
ERYTHROCYTE [DISTWIDTH] IN BLOOD BY AUTOMATED COUNT: 16.9 % (ref 11.5–17)
GLOBULIN SER-MCNC: 3.1 GM/DL (ref 2.4–3.5)
GLUCOSE SERPL-MCNC: 71 MG/DL (ref 74–106)
HCT VFR BLD AUTO: 41.1 % (ref 37–47)
HDLC SERPL-MCNC: 50 MG/DL (ref 35–60)
HGB BLD-MCNC: 13 GM/DL (ref 12–16)
LDLC SERPL CALC-MCNC: 105 MG/DL (ref 0–129)
MCH RBC QN AUTO: 32.6 PG (ref 27–31)
MCHC RBC AUTO-ENTMCNC: 31.6 GM/DL (ref 33–36)
MCV RBC AUTO: 103 FL (ref 80–94)
PLATELET # BLD AUTO: 247 X10(3)/MCL (ref 130–400)
PMV BLD AUTO: 9.6 FL (ref 9.4–12.4)
POTASSIUM SERPL-SCNC: 4.2 MMOL/L (ref 3.5–5.1)
PROT SERPL-MCNC: 6.5 GM/DL (ref 6.4–8.2)
RBC # BLD AUTO: 3.99 X10(6)/MCL (ref 4.2–5.4)
SODIUM SERPL-SCNC: 139 MMOL/L (ref 136–145)
TRIGL SERPL-MCNC: 190 MG/DL (ref 30–150)
TSH SERPL-ACNC: 4.73 MIU/L (ref 0.36–3.74)
VLDLC SERPL CALC-MCNC: 38 MG/DL
WBC # SPEC AUTO: 7.2 X10(3)/MCL (ref 4.5–11.5)

## 2019-04-18 ENCOUNTER — HISTORICAL (OUTPATIENT)
Dept: INFUSION THERAPY | Facility: HOSPITAL | Age: 64
End: 2019-04-18

## 2019-04-19 ENCOUNTER — HISTORICAL (OUTPATIENT)
Dept: INFUSION THERAPY | Facility: HOSPITAL | Age: 64
End: 2019-04-19

## 2019-04-22 ENCOUNTER — HISTORICAL (OUTPATIENT)
Dept: HEMATOLOGY/ONCOLOGY | Facility: CLINIC | Age: 64
End: 2019-04-22

## 2019-04-29 ENCOUNTER — HISTORICAL (OUTPATIENT)
Dept: HEMATOLOGY/ONCOLOGY | Facility: CLINIC | Age: 64
End: 2019-04-29

## 2019-05-07 ENCOUNTER — HISTORICAL (OUTPATIENT)
Dept: INFUSION THERAPY | Facility: HOSPITAL | Age: 64
End: 2019-05-07

## 2019-05-09 ENCOUNTER — HISTORICAL (OUTPATIENT)
Dept: INFUSION THERAPY | Facility: HOSPITAL | Age: 64
End: 2019-05-09

## 2019-05-10 ENCOUNTER — HISTORICAL (OUTPATIENT)
Dept: INFUSION THERAPY | Facility: HOSPITAL | Age: 64
End: 2019-05-10

## 2019-05-14 ENCOUNTER — HISTORICAL (OUTPATIENT)
Dept: HEMATOLOGY/ONCOLOGY | Facility: CLINIC | Age: 64
End: 2019-05-14

## 2019-05-20 ENCOUNTER — HISTORICAL (OUTPATIENT)
Dept: HEMATOLOGY/ONCOLOGY | Facility: CLINIC | Age: 64
End: 2019-05-20

## 2019-05-29 ENCOUNTER — HISTORICAL (OUTPATIENT)
Dept: INFUSION THERAPY | Facility: HOSPITAL | Age: 64
End: 2019-05-29

## 2019-05-31 ENCOUNTER — HISTORICAL (OUTPATIENT)
Dept: INFUSION THERAPY | Facility: HOSPITAL | Age: 64
End: 2019-05-31

## 2019-06-01 ENCOUNTER — HISTORICAL (OUTPATIENT)
Dept: INFUSION THERAPY | Facility: HOSPITAL | Age: 64
End: 2019-06-01

## 2019-06-05 ENCOUNTER — HISTORICAL (OUTPATIENT)
Dept: HEMATOLOGY/ONCOLOGY | Facility: CLINIC | Age: 64
End: 2019-06-05

## 2019-06-11 ENCOUNTER — HISTORICAL (OUTPATIENT)
Dept: HEMATOLOGY/ONCOLOGY | Facility: CLINIC | Age: 64
End: 2019-06-11

## 2019-06-18 ENCOUNTER — HISTORICAL (OUTPATIENT)
Dept: INFUSION THERAPY | Facility: HOSPITAL | Age: 64
End: 2019-06-18

## 2019-06-20 ENCOUNTER — HISTORICAL (OUTPATIENT)
Dept: INFUSION THERAPY | Facility: HOSPITAL | Age: 64
End: 2019-06-20

## 2019-06-21 ENCOUNTER — HISTORICAL (OUTPATIENT)
Dept: INFUSION THERAPY | Facility: HOSPITAL | Age: 64
End: 2019-06-21

## 2019-06-26 ENCOUNTER — HISTORICAL (OUTPATIENT)
Dept: HEMATOLOGY/ONCOLOGY | Facility: CLINIC | Age: 64
End: 2019-06-26

## 2019-07-03 ENCOUNTER — HISTORICAL (OUTPATIENT)
Dept: HEMATOLOGY/ONCOLOGY | Facility: CLINIC | Age: 64
End: 2019-07-03

## 2019-07-05 ENCOUNTER — HISTORICAL (OUTPATIENT)
Dept: RADIOLOGY | Facility: HOSPITAL | Age: 64
End: 2019-07-05

## 2019-07-09 ENCOUNTER — HISTORICAL (OUTPATIENT)
Dept: INFUSION THERAPY | Facility: HOSPITAL | Age: 64
End: 2019-07-09

## 2019-07-11 ENCOUNTER — HISTORICAL (OUTPATIENT)
Dept: INFUSION THERAPY | Facility: HOSPITAL | Age: 64
End: 2019-07-11

## 2019-07-12 ENCOUNTER — HISTORICAL (OUTPATIENT)
Dept: INFUSION THERAPY | Facility: HOSPITAL | Age: 64
End: 2019-07-12

## 2019-07-17 ENCOUNTER — HISTORICAL (OUTPATIENT)
Dept: HEMATOLOGY/ONCOLOGY | Facility: CLINIC | Age: 64
End: 2019-07-17

## 2019-07-18 ENCOUNTER — HISTORICAL (OUTPATIENT)
Dept: INFUSION THERAPY | Facility: HOSPITAL | Age: 64
End: 2019-07-18

## 2019-07-26 ENCOUNTER — HISTORICAL (OUTPATIENT)
Dept: HEMATOLOGY/ONCOLOGY | Facility: CLINIC | Age: 64
End: 2019-07-26

## 2019-08-19 ENCOUNTER — HISTORICAL (OUTPATIENT)
Dept: HEMATOLOGY/ONCOLOGY | Facility: CLINIC | Age: 64
End: 2019-08-19

## 2019-09-06 ENCOUNTER — HISTORICAL (OUTPATIENT)
Dept: HEMATOLOGY/ONCOLOGY | Facility: CLINIC | Age: 64
End: 2019-09-06

## 2019-09-11 ENCOUNTER — HISTORICAL (OUTPATIENT)
Dept: INFUSION THERAPY | Facility: HOSPITAL | Age: 64
End: 2019-09-11

## 2019-10-18 ENCOUNTER — HISTORICAL (OUTPATIENT)
Dept: HEMATOLOGY/ONCOLOGY | Facility: CLINIC | Age: 64
End: 2019-10-18

## 2019-10-18 LAB
ABS NEUT (OLG): 3.17 X10(3)/MCL (ref 2.1–9.2)
ALBUMIN SERPL-MCNC: 3.9 GM/DL (ref 3.4–5)
ALBUMIN/GLOB SERPL: 1.4 {RATIO}
ALP SERPL-CCNC: 54 UNIT/L (ref 38–126)
ALT SERPL-CCNC: 21 UNIT/L (ref 12–78)
AST SERPL-CCNC: 21 UNIT/L (ref 15–37)
BASOPHILS # BLD AUTO: 0 X10(3)/MCL (ref 0–0.2)
BASOPHILS NFR BLD AUTO: 0.2 %
BILIRUB SERPL-MCNC: 0.6 MG/DL (ref 0.2–1)
BILIRUBIN DIRECT+TOT PNL SERPL-MCNC: 0.1 MG/DL (ref 0–0.2)
BILIRUBIN DIRECT+TOT PNL SERPL-MCNC: 0.5 MG/DL (ref 0–0.8)
BUN SERPL-MCNC: 15 MG/DL (ref 7–18)
CALCIUM SERPL-MCNC: 9.5 MG/DL (ref 8.5–10.1)
CEA SERPL-MCNC: 6.5 NG/ML (ref 0–3)
CHLORIDE SERPL-SCNC: 103 MMOL/L (ref 98–107)
CO2 SERPL-SCNC: 28 MMOL/L (ref 21–32)
CREAT SERPL-MCNC: 1.25 MG/DL (ref 0.55–1.02)
EOSINOPHIL # BLD AUTO: 0 X10(3)/MCL (ref 0–0.9)
EOSINOPHIL NFR BLD AUTO: 0.8 %
ERYTHROCYTE [DISTWIDTH] IN BLOOD BY AUTOMATED COUNT: 14.3 % (ref 11.5–17)
GLOBULIN SER-MCNC: 2.7 GM/DL (ref 2.4–3.5)
GLUCOSE SERPL-MCNC: 90 MG/DL (ref 74–106)
HCT VFR BLD AUTO: 37.7 % (ref 37–47)
HGB BLD-MCNC: 11.9 GM/DL (ref 12–16)
LYMPHOCYTES # BLD AUTO: 1.4 X10(3)/MCL (ref 0.6–4.6)
LYMPHOCYTES NFR BLD AUTO: 27.2 %
MCH RBC QN AUTO: 30.2 PG (ref 27–31)
MCHC RBC AUTO-ENTMCNC: 31.6 GM/DL (ref 33–36)
MCV RBC AUTO: 95.7 FL (ref 80–94)
MONOCYTES # BLD AUTO: 0.4 X10(3)/MCL (ref 0.1–1.3)
MONOCYTES NFR BLD AUTO: 7.7 %
NEUTROPHILS # BLD AUTO: 3.2 X10(3)/MCL (ref 2.1–9.2)
NEUTROPHILS NFR BLD AUTO: 63.9 %
PLATELET # BLD AUTO: 229 X10(3)/MCL (ref 130–400)
PMV BLD AUTO: 8.5 FL (ref 9.4–12.4)
POTASSIUM SERPL-SCNC: 4.4 MMOL/L (ref 3.5–5.1)
PROT SERPL-MCNC: 6.6 GM/DL (ref 6.4–8.2)
RBC # BLD AUTO: 3.94 X10(6)/MCL (ref 4.2–5.4)
SODIUM SERPL-SCNC: 137 MMOL/L (ref 136–145)
WBC # SPEC AUTO: 5 X10(3)/MCL (ref 4.5–11.5)

## 2019-11-01 ENCOUNTER — HOSPITAL ENCOUNTER (OUTPATIENT)
Dept: MEDSURG UNIT | Facility: HOSPITAL | Age: 64
End: 2019-11-02
Attending: SURGERY | Admitting: SURGERY

## 2019-11-01 LAB
ABS NEUT (OLG): 7.49 X10(3)/MCL (ref 2.1–9.2)
ALBUMIN SERPL-MCNC: 4.2 GM/DL (ref 3.4–5)
ALBUMIN/GLOB SERPL: 1.6 {RATIO}
ALP SERPL-CCNC: 60 UNIT/L (ref 38–126)
ALT SERPL-CCNC: 21 UNIT/L (ref 12–78)
APTT PPP: 26.9 SECOND(S) (ref 24.2–33.9)
AST SERPL-CCNC: 30 UNIT/L (ref 15–37)
BASOPHILS # BLD AUTO: 0 X10(3)/MCL (ref 0–0.2)
BASOPHILS NFR BLD AUTO: 0 %
BILIRUB SERPL-MCNC: 0.4 MG/DL (ref 0.2–1)
BILIRUBIN DIRECT+TOT PNL SERPL-MCNC: 0.1 MG/DL (ref 0–0.2)
BILIRUBIN DIRECT+TOT PNL SERPL-MCNC: 0.3 MG/DL (ref 0–0.8)
BUN SERPL-MCNC: 18 MG/DL (ref 7–18)
CALCIUM SERPL-MCNC: 9.7 MG/DL (ref 8.5–10.1)
CHLORIDE SERPL-SCNC: 102 MMOL/L (ref 98–107)
CO2 SERPL-SCNC: 28 MMOL/L (ref 21–32)
CREAT SERPL-MCNC: 1.13 MG/DL (ref 0.55–1.02)
EOSINOPHIL # BLD AUTO: 0 X10(3)/MCL (ref 0–0.9)
EOSINOPHIL NFR BLD AUTO: 0 %
ERYTHROCYTE [DISTWIDTH] IN BLOOD BY AUTOMATED COUNT: 14.1 % (ref 11.5–17)
GLOBULIN SER-MCNC: 2.7 GM/DL (ref 2.4–3.5)
GLUCOSE SERPL-MCNC: 128 MG/DL (ref 74–106)
HCT VFR BLD AUTO: 37.8 % (ref 37–47)
HGB BLD-MCNC: 12.1 GM/DL (ref 12–16)
INR PPP: 1 (ref 0–1.3)
LACTATE SERPL-SCNC: 1.5 MMOL/L (ref 0.4–2)
LIPASE SERPL-CCNC: 144 UNIT/L (ref 73–393)
LYMPHOCYTES # BLD AUTO: 1 X10(3)/MCL (ref 0.6–4.6)
LYMPHOCYTES NFR BLD AUTO: 11 %
MCH RBC QN AUTO: 29.9 PG (ref 27–31)
MCHC RBC AUTO-ENTMCNC: 32 GM/DL (ref 33–36)
MCV RBC AUTO: 93.3 FL (ref 80–94)
MONOCYTES # BLD AUTO: 0.5 X10(3)/MCL (ref 0.1–1.3)
MONOCYTES NFR BLD AUTO: 5 %
NEUTROPHILS # BLD AUTO: 7.49 X10(3)/MCL (ref 2.1–9.2)
NEUTROPHILS NFR BLD AUTO: 83 %
PLATELET # BLD AUTO: 234 X10(3)/MCL (ref 130–400)
PMV BLD AUTO: 9.1 FL (ref 9.4–12.4)
POTASSIUM SERPL-SCNC: 4.4 MMOL/L (ref 3.5–5.1)
PROT SERPL-MCNC: 6.9 GM/DL (ref 6.4–8.2)
PROTHROMBIN TIME: 12.9 SECOND(S) (ref 12–14)
RBC # BLD AUTO: 4.05 X10(6)/MCL (ref 4.2–5.4)
SODIUM SERPL-SCNC: 137 MMOL/L (ref 136–145)
TROPONIN I SERPL-MCNC: <0.02 NG/ML (ref 0.02–0.49)
WBC # SPEC AUTO: 9 X10(3)/MCL (ref 4.5–11.5)

## 2019-11-02 LAB
ABS NEUT (OLG): 2.79 X10(3)/MCL (ref 2.1–9.2)
BASOPHILS # BLD AUTO: 0 X10(3)/MCL (ref 0–0.2)
BASOPHILS NFR BLD AUTO: 1 %
BUN SERPL-MCNC: 11 MG/DL (ref 7–18)
CALCIUM SERPL-MCNC: 8.5 MG/DL (ref 8.5–10.1)
CHLORIDE SERPL-SCNC: 108 MMOL/L (ref 98–107)
CO2 SERPL-SCNC: 32 MMOL/L (ref 21–32)
CREAT SERPL-MCNC: 1.08 MG/DL (ref 0.55–1.02)
CREAT/UREA NIT SERPL: 10.2
EOSINOPHIL # BLD AUTO: 0.1 X10(3)/MCL (ref 0–0.9)
EOSINOPHIL NFR BLD AUTO: 2 %
ERYTHROCYTE [DISTWIDTH] IN BLOOD BY AUTOMATED COUNT: 14.2 % (ref 11.5–17)
GLUCOSE SERPL-MCNC: 95 MG/DL (ref 74–106)
HCT VFR BLD AUTO: 34 % (ref 37–47)
HGB BLD-MCNC: 10.3 GM/DL (ref 12–16)
LYMPHOCYTES # BLD AUTO: 1.2 X10(3)/MCL (ref 0.6–4.6)
LYMPHOCYTES NFR BLD AUTO: 27 %
MCH RBC QN AUTO: 29.6 PG (ref 27–31)
MCHC RBC AUTO-ENTMCNC: 30.3 GM/DL (ref 33–36)
MCV RBC AUTO: 97.7 FL (ref 80–94)
MONOCYTES # BLD AUTO: 0.4 X10(3)/MCL (ref 0.1–1.3)
MONOCYTES NFR BLD AUTO: 10 %
NEUTROPHILS # BLD AUTO: 2.79 X10(3)/MCL (ref 2.1–9.2)
NEUTROPHILS NFR BLD AUTO: 61 %
PLATELET # BLD AUTO: 199 X10(3)/MCL (ref 130–400)
PMV BLD AUTO: 9.4 FL (ref 9.4–12.4)
POTASSIUM SERPL-SCNC: 4.2 MMOL/L (ref 3.5–5.1)
RBC # BLD AUTO: 3.48 X10(6)/MCL (ref 4.2–5.4)
SODIUM SERPL-SCNC: 143 MMOL/L (ref 136–145)
WBC # SPEC AUTO: 4.6 X10(3)/MCL (ref 4.5–11.5)

## 2019-11-06 ENCOUNTER — HISTORICAL (OUTPATIENT)
Dept: INFUSION THERAPY | Facility: HOSPITAL | Age: 64
End: 2019-11-06

## 2019-11-15 ENCOUNTER — HISTORICAL (OUTPATIENT)
Dept: HEMATOLOGY/ONCOLOGY | Facility: CLINIC | Age: 64
End: 2019-11-15

## 2019-11-15 LAB
ABS NEUT (OLG): 4.24 X10(3)/MCL (ref 2.1–9.2)
ALBUMIN SERPL-MCNC: 3.8 GM/DL (ref 3.4–5)
ALBUMIN/GLOB SERPL: 1.3 {RATIO}
ALP SERPL-CCNC: 61 UNIT/L (ref 38–126)
ALT SERPL-CCNC: 29 UNIT/L (ref 12–78)
AST SERPL-CCNC: 30 UNIT/L (ref 15–37)
BASOPHILS # BLD AUTO: 0 X10(3)/MCL (ref 0–0.2)
BASOPHILS NFR BLD AUTO: 0.2 %
BILIRUB SERPL-MCNC: 0.4 MG/DL (ref 0.2–1)
BILIRUBIN DIRECT+TOT PNL SERPL-MCNC: 0.1 MG/DL (ref 0–0.2)
BILIRUBIN DIRECT+TOT PNL SERPL-MCNC: 0.3 MG/DL (ref 0–0.8)
BUN SERPL-MCNC: 24 MG/DL (ref 7–18)
CALCIUM SERPL-MCNC: 9.3 MG/DL (ref 8.5–10.1)
CEA SERPL-MCNC: 14.6 NG/ML (ref 0–3)
CHLORIDE SERPL-SCNC: 105 MMOL/L (ref 98–107)
CO2 SERPL-SCNC: 28 MMOL/L (ref 21–32)
CREAT SERPL-MCNC: 1.25 MG/DL (ref 0.55–1.02)
EOSINOPHIL # BLD AUTO: 0.1 X10(3)/MCL (ref 0–0.9)
EOSINOPHIL NFR BLD AUTO: 1.3 %
ERYTHROCYTE [DISTWIDTH] IN BLOOD BY AUTOMATED COUNT: 14.5 % (ref 11.5–17)
GLOBULIN SER-MCNC: 3 GM/DL (ref 2.4–3.5)
GLUCOSE SERPL-MCNC: 93 MG/DL (ref 74–106)
HCT VFR BLD AUTO: 38.4 % (ref 37–47)
HGB BLD-MCNC: 12.1 GM/DL (ref 12–16)
LYMPHOCYTES # BLD AUTO: 1.5 X10(3)/MCL (ref 0.6–4.6)
LYMPHOCYTES NFR BLD AUTO: 24.3 %
MCH RBC QN AUTO: 29.9 PG (ref 27–31)
MCHC RBC AUTO-ENTMCNC: 31.5 GM/DL (ref 33–36)
MCV RBC AUTO: 94.8 FL (ref 80–94)
MONOCYTES # BLD AUTO: 0.4 X10(3)/MCL (ref 0.1–1.3)
MONOCYTES NFR BLD AUTO: 6 %
NEUTROPHILS # BLD AUTO: 4.2 X10(3)/MCL (ref 2.1–9.2)
NEUTROPHILS NFR BLD AUTO: 68.2 %
PLATELET # BLD AUTO: 310 X10(3)/MCL (ref 130–400)
PMV BLD AUTO: 8.8 FL (ref 9.4–12.4)
POTASSIUM SERPL-SCNC: 4.3 MMOL/L (ref 3.5–5.1)
PROT SERPL-MCNC: 6.8 GM/DL (ref 6.4–8.2)
RBC # BLD AUTO: 4.05 X10(6)/MCL (ref 4.2–5.4)
SODIUM SERPL-SCNC: 138 MMOL/L (ref 136–145)
WBC # SPEC AUTO: 6.2 X10(3)/MCL (ref 4.5–11.5)

## 2019-11-26 ENCOUNTER — HISTORICAL (OUTPATIENT)
Dept: HEMATOLOGY/ONCOLOGY | Facility: CLINIC | Age: 64
End: 2019-11-26

## 2019-11-26 LAB
ABS NEUT (OLG): 4.48 X10(3)/MCL (ref 2.1–9.2)
ANION GAP SERPL CALC-SCNC: 14 MMOL/L
BASOPHILS # BLD AUTO: 0 X10(3)/MCL (ref 0–0.2)
BASOPHILS NFR BLD AUTO: 0.3 %
BUN SERPL-MCNC: 19 MG/DL (ref 8–26)
CEA SERPL-MCNC: 19.9 NG/ML (ref 0–3)
CHLORIDE SERPL-SCNC: 101 MMOL/L (ref 98–109)
CREAT SERPL-MCNC: 1.1 MG/DL (ref 0.6–1.3)
EOSINOPHIL # BLD AUTO: 0.2 X10(3)/MCL (ref 0–0.9)
EOSINOPHIL NFR BLD AUTO: 2.7 %
ERYTHROCYTE [DISTWIDTH] IN BLOOD BY AUTOMATED COUNT: 14.8 % (ref 11.5–17)
GLUCOSE SERPL-MCNC: 90 MG/DL (ref 70–105)
HCT VFR BLD AUTO: 37 % (ref 37–47)
HCT VFR BLD CALC: 38 % (ref 38–51)
HGB BLD-MCNC: 11.6 GM/DL (ref 12–16)
HGB BLD-MCNC: 12.9 MG/DL (ref 12–17)
LYMPHOCYTES # BLD AUTO: 1.8 X10(3)/MCL (ref 0.6–4.6)
LYMPHOCYTES NFR BLD AUTO: 25.2 %
MCH RBC QN AUTO: 29.4 PG (ref 27–31)
MCHC RBC AUTO-ENTMCNC: 31.4 GM/DL (ref 33–36)
MCV RBC AUTO: 93.7 FL (ref 80–94)
MONOCYTES # BLD AUTO: 0.5 X10(3)/MCL (ref 0.1–1.3)
MONOCYTES NFR BLD AUTO: 7.4 %
NEUTROPHILS # BLD AUTO: 4.5 X10(3)/MCL (ref 2.1–9.2)
NEUTROPHILS NFR BLD AUTO: 64.3 %
PLATELET # BLD AUTO: 271 X10(3)/MCL (ref 130–400)
PMV BLD AUTO: 8.5 FL (ref 9.4–12.4)
POC IONIZED CALCIUM: 1.21 MMOL/L (ref 1.12–1.32)
POC TCO2: 26 MMOL/L (ref 24–29)
POTASSIUM BLD-SCNC: 4.2 MMOL/L (ref 3.5–4.9)
RBC # BLD AUTO: 3.95 X10(6)/MCL (ref 4.2–5.4)
SODIUM BLD-SCNC: 136 MMOL/L (ref 138–146)
WBC # SPEC AUTO: 7 X10(3)/MCL (ref 4.5–11.5)

## 2019-12-10 ENCOUNTER — HISTORICAL (OUTPATIENT)
Dept: INFUSION THERAPY | Facility: HOSPITAL | Age: 64
End: 2019-12-10

## 2019-12-10 LAB
ABS NEUT (OLG): 5.14 X10(3)/MCL (ref 2.1–9.2)
ALBUMIN SERPL-MCNC: 3.8 GM/DL (ref 3.4–5)
ALBUMIN/GLOB SERPL: 1.4 {RATIO}
ALP SERPL-CCNC: 59 UNIT/L (ref 38–126)
ALT SERPL-CCNC: 26 UNIT/L (ref 12–78)
AST SERPL-CCNC: 24 UNIT/L (ref 15–37)
BASOPHILS # BLD AUTO: 0 X10(3)/MCL (ref 0–0.2)
BASOPHILS NFR BLD AUTO: 0.4 %
BILIRUB SERPL-MCNC: 0.7 MG/DL (ref 0.2–1)
BILIRUBIN DIRECT+TOT PNL SERPL-MCNC: 0.1 MG/DL (ref 0–0.2)
BILIRUBIN DIRECT+TOT PNL SERPL-MCNC: 0.6 MG/DL (ref 0–0.8)
BUN SERPL-MCNC: 20 MG/DL (ref 7–18)
CALCIUM SERPL-MCNC: 9.8 MG/DL (ref 8.5–10.1)
CHLORIDE SERPL-SCNC: 104 MMOL/L (ref 98–107)
CO2 SERPL-SCNC: 27 MMOL/L (ref 21–32)
CREAT SERPL-MCNC: 1.07 MG/DL (ref 0.55–1.02)
EOSINOPHIL # BLD AUTO: 0.1 X10(3)/MCL (ref 0–0.9)
EOSINOPHIL NFR BLD AUTO: 1.1 %
ERYTHROCYTE [DISTWIDTH] IN BLOOD BY AUTOMATED COUNT: 15.8 % (ref 11.5–17)
GLOBULIN SER-MCNC: 2.8 GM/DL (ref 2.4–3.5)
GLUCOSE SERPL-MCNC: 88 MG/DL (ref 74–106)
HCT VFR BLD AUTO: 38.1 % (ref 37–47)
HGB BLD-MCNC: 12 GM/DL (ref 12–16)
LYMPHOCYTES # BLD AUTO: 1.4 X10(3)/MCL (ref 0.6–4.6)
LYMPHOCYTES NFR BLD AUTO: 19 %
MCH RBC QN AUTO: 29.5 PG (ref 27–31)
MCHC RBC AUTO-ENTMCNC: 31.5 GM/DL (ref 33–36)
MCV RBC AUTO: 93.6 FL (ref 80–94)
MONOCYTES # BLD AUTO: 0.5 X10(3)/MCL (ref 0.1–1.3)
MONOCYTES NFR BLD AUTO: 7 %
NEUTROPHILS # BLD AUTO: 5.1 X10(3)/MCL (ref 2.1–9.2)
NEUTROPHILS NFR BLD AUTO: 72.4 %
PLATELET # BLD AUTO: 271 X10(3)/MCL (ref 130–400)
PMV BLD AUTO: 9 FL (ref 9.4–12.4)
POTASSIUM SERPL-SCNC: 4.3 MMOL/L (ref 3.5–5.1)
PROT SERPL-MCNC: 6.6 GM/DL (ref 6.4–8.2)
RBC # BLD AUTO: 4.07 X10(6)/MCL (ref 4.2–5.4)
SODIUM SERPL-SCNC: 138 MMOL/L (ref 136–145)
WBC # SPEC AUTO: 7.1 X10(3)/MCL (ref 4.5–11.5)

## 2019-12-12 ENCOUNTER — HISTORICAL (OUTPATIENT)
Dept: INFUSION THERAPY | Facility: HOSPITAL | Age: 64
End: 2019-12-12

## 2019-12-17 ENCOUNTER — HISTORICAL (OUTPATIENT)
Dept: HEMATOLOGY/ONCOLOGY | Facility: CLINIC | Age: 64
End: 2019-12-17

## 2019-12-17 LAB
ABS NEUT (OLG): 2.94 X10(3)/MCL (ref 2.1–9.2)
ALBUMIN SERPL-MCNC: 3.1 GM/DL (ref 3.4–5)
ALP SERPL-CCNC: 148 UNIT/L (ref 38–126)
ALT SERPL-CCNC: 128 UNIT/L (ref 12–78)
ANION GAP SERPL CALC-SCNC: 13 MMOL/L
AST SERPL-CCNC: 88 UNIT/L (ref 15–37)
BASOPHILS # BLD AUTO: 0 X10(3)/MCL (ref 0–0.2)
BASOPHILS NFR BLD AUTO: 0.2 %
BILIRUB SERPL-MCNC: 0.5 MG/DL (ref 0.2–1)
BILIRUBIN DIRECT+TOT PNL SERPL-MCNC: 0.2 MG/DL (ref 0–0.5)
BILIRUBIN DIRECT+TOT PNL SERPL-MCNC: 0.3 MG/DL (ref 0–0.8)
BUN SERPL-MCNC: 27 MG/DL (ref 8–26)
CHLORIDE SERPL-SCNC: 104 MMOL/L (ref 98–109)
CREAT SERPL-MCNC: 1.2 MG/DL (ref 0.6–1.3)
EOSINOPHIL # BLD AUTO: 0.2 X10(3)/MCL (ref 0–0.9)
EOSINOPHIL NFR BLD AUTO: 3.9 %
ERYTHROCYTE [DISTWIDTH] IN BLOOD BY AUTOMATED COUNT: 14.7 % (ref 11.5–17)
GLUCOSE SERPL-MCNC: 106 MG/DL (ref 70–105)
HCT VFR BLD AUTO: 35.3 % (ref 37–47)
HCT VFR BLD CALC: 34 % (ref 38–51)
HGB BLD-MCNC: 11.2 GM/DL (ref 12–16)
HGB BLD-MCNC: 11.6 MG/DL (ref 12–17)
LIVER PROFILE INTERP: ABNORMAL
LYMPHOCYTES # BLD AUTO: 1.1 X10(3)/MCL (ref 0.6–4.6)
LYMPHOCYTES NFR BLD AUTO: 23.7 %
MCH RBC QN AUTO: 29.6 PG (ref 27–31)
MCHC RBC AUTO-ENTMCNC: 31.7 GM/DL (ref 33–36)
MCV RBC AUTO: 93.4 FL (ref 80–94)
MONOCYTES # BLD AUTO: 0.4 X10(3)/MCL (ref 0.1–1.3)
MONOCYTES NFR BLD AUTO: 7.6 %
NEUTROPHILS # BLD AUTO: 2.9 X10(3)/MCL (ref 2.1–9.2)
NEUTROPHILS NFR BLD AUTO: 63.9 %
PLATELET # BLD AUTO: 185 X10(3)/MCL (ref 130–400)
PMV BLD AUTO: 9.5 FL (ref 9.4–12.4)
POC IONIZED CALCIUM: 1.21 MMOL/L (ref 1.12–1.32)
POC TCO2: 24 MMOL/L (ref 24–29)
POTASSIUM BLD-SCNC: 3.8 MMOL/L (ref 3.5–4.9)
PROT SERPL-MCNC: 6.5 GM/DL (ref 6.4–8.2)
RBC # BLD AUTO: 3.78 X10(6)/MCL (ref 4.2–5.4)
SODIUM BLD-SCNC: 137 MMOL/L (ref 138–146)
WBC # SPEC AUTO: 4.6 X10(3)/MCL (ref 4.5–11.5)

## 2019-12-23 ENCOUNTER — HISTORICAL (OUTPATIENT)
Dept: HEMATOLOGY/ONCOLOGY | Facility: CLINIC | Age: 64
End: 2019-12-23

## 2019-12-23 LAB
ABS NEUT (OLG): 2.54 X10(3)/MCL (ref 2.1–9.2)
ALBUMIN SERPL-MCNC: 3.4 GM/DL (ref 3.4–5)
ALBUMIN/GLOB SERPL: 1.1 {RATIO}
ALP SERPL-CCNC: 158 UNIT/L (ref 38–126)
ALT SERPL-CCNC: 70 UNIT/L (ref 12–78)
AST SERPL-CCNC: 32 UNIT/L (ref 15–37)
BASOPHILS # BLD AUTO: 0 X10(3)/MCL (ref 0–0.2)
BASOPHILS NFR BLD AUTO: 0.5 %
BILIRUB SERPL-MCNC: 0.6 MG/DL (ref 0.2–1)
BILIRUBIN DIRECT+TOT PNL SERPL-MCNC: 0.1 MG/DL (ref 0–0.2)
BILIRUBIN DIRECT+TOT PNL SERPL-MCNC: 0.5 MG/DL (ref 0–0.8)
BUN SERPL-MCNC: 21 MG/DL (ref 7–18)
CALCIUM SERPL-MCNC: 9.2 MG/DL (ref 8.5–10.1)
CEA SERPL-MCNC: 27.5 NG/ML (ref 0–3)
CHLORIDE SERPL-SCNC: 104 MMOL/L (ref 98–107)
CO2 SERPL-SCNC: 27 MMOL/L (ref 21–32)
CREAT SERPL-MCNC: 1.04 MG/DL (ref 0.55–1.02)
EOSINOPHIL # BLD AUTO: 0.1 X10(3)/MCL (ref 0–0.9)
EOSINOPHIL NFR BLD AUTO: 2.1 %
ERYTHROCYTE [DISTWIDTH] IN BLOOD BY AUTOMATED COUNT: 15.4 % (ref 11.5–17)
GLOBULIN SER-MCNC: 3 GM/DL (ref 2.4–3.5)
GLUCOSE SERPL-MCNC: 89 MG/DL (ref 74–106)
HCT VFR BLD AUTO: 35.2 % (ref 37–47)
HGB BLD-MCNC: 11.3 GM/DL (ref 12–16)
LYMPHOCYTES # BLD AUTO: 1.3 X10(3)/MCL (ref 0.6–4.6)
LYMPHOCYTES NFR BLD AUTO: 30.5 %
MCH RBC QN AUTO: 30.1 PG (ref 27–31)
MCHC RBC AUTO-ENTMCNC: 32.1 GM/DL (ref 33–36)
MCV RBC AUTO: 93.6 FL (ref 80–94)
MONOCYTES # BLD AUTO: 0.3 X10(3)/MCL (ref 0.1–1.3)
MONOCYTES NFR BLD AUTO: 7.5 %
NEUTROPHILS # BLD AUTO: 2.5 X10(3)/MCL (ref 2.1–9.2)
NEUTROPHILS NFR BLD AUTO: 59.2 %
PLATELET # BLD AUTO: 365 X10(3)/MCL (ref 130–400)
PMV BLD AUTO: 8.3 FL (ref 9.4–12.4)
POTASSIUM SERPL-SCNC: 4.5 MMOL/L (ref 3.5–5.1)
PROT SERPL-MCNC: 6.4 GM/DL (ref 6.4–8.2)
RBC # BLD AUTO: 3.76 X10(6)/MCL (ref 4.2–5.4)
SODIUM SERPL-SCNC: 138 MMOL/L (ref 136–145)
WBC # SPEC AUTO: 4.3 X10(3)/MCL (ref 4.5–11.5)

## 2019-12-30 ENCOUNTER — HISTORICAL (OUTPATIENT)
Dept: ADMINISTRATIVE | Facility: HOSPITAL | Age: 64
End: 2019-12-30

## 2019-12-30 LAB
ABS NEUT (OLG): 1.7 X10(3)/MCL (ref 2.1–9.2)
ANION GAP SERPL CALC-SCNC: 12 MMOL/L
BASOPHILS # BLD AUTO: 0 X10(3)/MCL (ref 0–0.2)
BASOPHILS NFR BLD AUTO: 0.7 %
BUN SERPL-MCNC: 21 MG/DL (ref 8–26)
CHLORIDE SERPL-SCNC: 108 MMOL/L (ref 98–109)
CREAT SERPL-MCNC: 1.1 MG/DL (ref 0.6–1.3)
EOSINOPHIL # BLD AUTO: 0.2 X10(3)/MCL (ref 0–0.9)
EOSINOPHIL NFR BLD AUTO: 4.8 %
EOSINOPHIL NFR BLD MANUAL: 5 % (ref 0–8)
ERYTHROCYTE [DISTWIDTH] IN BLOOD BY AUTOMATED COUNT: 16.5 % (ref 11.5–17)
GLUCOSE SERPL-MCNC: 102 MG/DL (ref 70–105)
GRANULOCYTES NFR BLD MANUAL: 45 % (ref 47–80)
HCT VFR BLD AUTO: 38.4 % (ref 37–47)
HCT VFR BLD CALC: 36 % (ref 38–51)
HGB BLD-MCNC: 12.1 GM/DL (ref 12–16)
HGB BLD-MCNC: 12.2 MG/DL (ref 12–17)
LYMPHOCYTES # BLD AUTO: 1.8 X10(3)/MCL (ref 0.6–4.6)
LYMPHOCYTES NFR BLD AUTO: 40 %
LYMPHOCYTES NFR BLD MANUAL: 44 % (ref 13–40)
MCH RBC QN AUTO: 30.2 PG (ref 27–31)
MCHC RBC AUTO-ENTMCNC: 31.5 GM/DL (ref 33–36)
MCV RBC AUTO: 95.8 FL (ref 80–94)
MONOCYTES # BLD AUTO: 0.7 X10(3)/MCL (ref 0.1–1.3)
MONOCYTES NFR BLD AUTO: 16.2 %
MONOCYTES NFR BLD MANUAL: 6 % (ref 2–11)
NEUTROPHILS # BLD AUTO: 1.7 X10(3)/MCL (ref 2.1–9.2)
NEUTROPHILS NFR BLD AUTO: 37.2 %
PLATELET # BLD AUTO: 346 X10(3)/MCL (ref 130–400)
PLATELET # BLD EST: NORMAL 10*3/UL
PMV BLD AUTO: 8.5 FL (ref 9.4–12.4)
POC IONIZED CALCIUM: 1.15 MMOL/L (ref 1.12–1.32)
POC TCO2: 23 MMOL/L (ref 24–29)
POTASSIUM BLD-SCNC: 4.4 MMOL/L (ref 3.5–4.9)
RBC # BLD AUTO: 4.01 X10(6)/MCL (ref 4.2–5.4)
RBC MORPH BLD: NORMAL
SODIUM BLD-SCNC: 137 MMOL/L (ref 138–146)
WBC # SPEC AUTO: 4.6 X10(3)/MCL (ref 4.5–11.5)

## 2020-01-06 ENCOUNTER — HISTORICAL (OUTPATIENT)
Dept: ADMINISTRATIVE | Facility: HOSPITAL | Age: 65
End: 2020-01-06

## 2020-01-06 LAB
ABS NEUT (OLG): 6.04 X10(3)/MCL (ref 2.1–9.2)
ANION GAP SERPL CALC-SCNC: 11 MMOL/L
BASOPHILS # BLD AUTO: 0 X10(3)/MCL (ref 0–0.2)
BASOPHILS NFR BLD AUTO: 0.4 %
BUN SERPL-MCNC: 28 MG/DL (ref 8–26)
CHLORIDE SERPL-SCNC: 104 MMOL/L (ref 98–109)
CREAT SERPL-MCNC: 1.1 MG/DL (ref 0.6–1.3)
EOSINOPHIL # BLD AUTO: 0.1 X10(3)/MCL (ref 0–0.9)
EOSINOPHIL NFR BLD AUTO: 1.2 %
ERYTHROCYTE [DISTWIDTH] IN BLOOD BY AUTOMATED COUNT: 17 % (ref 11.5–17)
GLUCOSE SERPL-MCNC: 97 MG/DL (ref 70–105)
HCT VFR BLD AUTO: 40.8 % (ref 37–47)
HCT VFR BLD CALC: 41 % (ref 38–51)
HGB BLD-MCNC: 12.7 GM/DL (ref 12–16)
HGB BLD-MCNC: 13.9 MG/DL (ref 12–17)
LYMPHOCYTES # BLD AUTO: 2.4 X10(3)/MCL (ref 0.6–4.6)
LYMPHOCYTES NFR BLD AUTO: 24.7 %
MCH RBC QN AUTO: 29.7 PG (ref 27–31)
MCHC RBC AUTO-ENTMCNC: 31.1 GM/DL (ref 33–36)
MCV RBC AUTO: 95.3 FL (ref 80–94)
MONOCYTES # BLD AUTO: 0.9 X10(3)/MCL (ref 0.1–1.3)
MONOCYTES NFR BLD AUTO: 9.4 %
NEUTROPHILS # BLD AUTO: 6 X10(3)/MCL (ref 2.1–9.2)
NEUTROPHILS NFR BLD AUTO: 63.6 %
PLATELET # BLD AUTO: 256 X10(3)/MCL (ref 130–400)
PMV BLD AUTO: 8.9 FL (ref 9.4–12.4)
POC IONIZED CALCIUM: 1.17 MMOL/L (ref 1.12–1.32)
POC TCO2: 28 MMOL/L (ref 24–29)
POTASSIUM BLD-SCNC: 4.2 MMOL/L (ref 3.5–4.9)
RBC # BLD AUTO: 4.28 X10(6)/MCL (ref 4.2–5.4)
SODIUM BLD-SCNC: 138 MMOL/L (ref 138–146)
WBC # SPEC AUTO: 9.5 X10(3)/MCL (ref 4.5–11.5)

## 2020-01-07 ENCOUNTER — HISTORICAL (OUTPATIENT)
Dept: INFUSION THERAPY | Facility: HOSPITAL | Age: 65
End: 2020-01-07

## 2020-01-09 ENCOUNTER — HISTORICAL (OUTPATIENT)
Dept: INFUSION THERAPY | Facility: HOSPITAL | Age: 65
End: 2020-01-09

## 2020-01-16 ENCOUNTER — HISTORICAL (OUTPATIENT)
Dept: HEMATOLOGY/ONCOLOGY | Facility: CLINIC | Age: 65
End: 2020-01-16

## 2020-01-22 ENCOUNTER — HISTORICAL (OUTPATIENT)
Dept: HEMATOLOGY/ONCOLOGY | Facility: CLINIC | Age: 65
End: 2020-01-22

## 2020-01-28 ENCOUNTER — HISTORICAL (OUTPATIENT)
Dept: INFUSION THERAPY | Facility: HOSPITAL | Age: 65
End: 2020-01-28

## 2020-01-30 ENCOUNTER — HISTORICAL (OUTPATIENT)
Dept: INFUSION THERAPY | Facility: HOSPITAL | Age: 65
End: 2020-01-30

## 2020-02-18 ENCOUNTER — HISTORICAL (OUTPATIENT)
Dept: INFUSION THERAPY | Facility: HOSPITAL | Age: 65
End: 2020-02-18

## 2020-02-20 ENCOUNTER — HISTORICAL (OUTPATIENT)
Dept: INFUSION THERAPY | Facility: HOSPITAL | Age: 65
End: 2020-02-20

## 2020-03-04 ENCOUNTER — HISTORICAL (OUTPATIENT)
Dept: ADMINISTRATIVE | Facility: HOSPITAL | Age: 65
End: 2020-03-04

## 2020-03-18 ENCOUNTER — HISTORICAL (OUTPATIENT)
Dept: CARDIOLOGY | Facility: HOSPITAL | Age: 65
End: 2020-03-18

## 2020-03-24 ENCOUNTER — HISTORICAL (OUTPATIENT)
Dept: INFUSION THERAPY | Facility: HOSPITAL | Age: 65
End: 2020-03-24

## 2020-03-26 ENCOUNTER — HISTORICAL (OUTPATIENT)
Dept: INFUSION THERAPY | Facility: HOSPITAL | Age: 65
End: 2020-03-26

## 2020-03-31 ENCOUNTER — HISTORICAL (OUTPATIENT)
Dept: HEMATOLOGY/ONCOLOGY | Facility: CLINIC | Age: 65
End: 2020-03-31

## 2020-04-07 ENCOUNTER — HISTORICAL (OUTPATIENT)
Dept: INFUSION THERAPY | Facility: HOSPITAL | Age: 65
End: 2020-04-07

## 2020-04-09 ENCOUNTER — HISTORICAL (OUTPATIENT)
Dept: INFUSION THERAPY | Facility: HOSPITAL | Age: 65
End: 2020-04-09

## 2020-04-22 ENCOUNTER — HISTORICAL (OUTPATIENT)
Dept: ADMINISTRATIVE | Facility: HOSPITAL | Age: 65
End: 2020-04-22

## 2020-04-22 LAB
ABS NEUT (OLG): 1.15 X10(3)/MCL (ref 2.1–9.2)
ALBUMIN SERPL-MCNC: 3.4 GM/DL (ref 3.4–4.8)
ALBUMIN/GLOB SERPL: 1.1 RATIO (ref 1.1–2)
ALP SERPL-CCNC: 101 UNIT/L (ref 40–150)
ALT SERPL-CCNC: 38 UNIT/L (ref 0–55)
AST SERPL-CCNC: 38 UNIT/L (ref 5–34)
BASOPHILS # BLD AUTO: 0 X10(3)/MCL (ref 0–0.2)
BASOPHILS NFR BLD AUTO: 0.2 %
BILIRUB SERPL-MCNC: 0.3 MG/DL
BILIRUBIN DIRECT+TOT PNL SERPL-MCNC: 0.1 MG/DL (ref 0–0.8)
BILIRUBIN DIRECT+TOT PNL SERPL-MCNC: 0.2 MG/DL (ref 0–0.5)
BUN SERPL-MCNC: 18 MG/DL (ref 9.8–20.1)
CALCIUM SERPL-MCNC: 9.4 MG/DL (ref 8.4–10.2)
CEA SERPL-MCNC: 25.8 NG/ML (ref 0–3)
CHLORIDE SERPL-SCNC: 97 MMOL/L (ref 98–107)
CO2 SERPL-SCNC: 23 MMOL/L (ref 23–31)
CREAT SERPL-MCNC: 1.43 MG/DL (ref 0.55–1.02)
EOSINOPHIL # BLD AUTO: 0.1 X10(3)/MCL (ref 0–0.9)
EOSINOPHIL NFR BLD AUTO: 1.5 %
ERYTHROCYTE [DISTWIDTH] IN BLOOD BY AUTOMATED COUNT: 14 % (ref 11.5–17)
GLOBULIN SER-MCNC: 3.1 GM/DL (ref 2.4–3.5)
GLUCOSE SERPL-MCNC: 103 MG/DL (ref 82–115)
HCT VFR BLD AUTO: 40.4 % (ref 37–47)
HGB BLD-MCNC: 13.4 GM/DL (ref 12–16)
LYMPHOCYTES # BLD AUTO: 1.9 X10(3)/MCL (ref 0.6–4.6)
LYMPHOCYTES NFR BLD AUTO: 46.7 %
MCH RBC QN AUTO: 32.1 PG (ref 27–31)
MCHC RBC AUTO-ENTMCNC: 33.2 GM/DL (ref 33–36)
MCV RBC AUTO: 96.7 FL (ref 80–94)
MONOCYTES # BLD AUTO: 0.6 X10(3)/MCL (ref 0.1–1.3)
MONOCYTES NFR BLD AUTO: 15.9 %
NEUTROPHILS # BLD AUTO: 1.2 X10(3)/MCL (ref 2.1–9.2)
NEUTROPHILS NFR BLD AUTO: 28.1 %
PLATELET # BLD AUTO: 224 X10(3)/MCL (ref 130–400)
PMV BLD AUTO: 8.9 FL (ref 9.4–12.4)
POTASSIUM SERPL-SCNC: 3.5 MMOL/L (ref 3.5–5.1)
PROT SERPL-MCNC: 6.5 GM/DL (ref 5.8–7.6)
RBC # BLD AUTO: 4.18 X10(6)/MCL (ref 4.2–5.4)
SODIUM SERPL-SCNC: 133 MMOL/L (ref 136–145)
WBC # SPEC AUTO: 4.1 X10(3)/MCL (ref 4.5–11.5)

## 2020-04-29 ENCOUNTER — HISTORICAL (OUTPATIENT)
Dept: HEMATOLOGY/ONCOLOGY | Facility: CLINIC | Age: 65
End: 2020-04-29

## 2020-04-29 LAB
ABS NEUT (OLG): 4.66 X10(3)/MCL (ref 2.1–9.2)
ALBUMIN SERPL-MCNC: 3.7 GM/DL (ref 3.4–4.8)
ALBUMIN/GLOB SERPL: 1.3 RATIO (ref 1.1–2)
ALP SERPL-CCNC: 71 UNIT/L (ref 40–150)
ALT SERPL-CCNC: 17 UNIT/L (ref 0–55)
AST SERPL-CCNC: 32 UNIT/L (ref 5–34)
BASOPHILS # BLD AUTO: 0 X10(3)/MCL (ref 0–0.2)
BASOPHILS NFR BLD AUTO: 0.4 %
BILIRUB SERPL-MCNC: 0.2 MG/DL
BILIRUBIN DIRECT+TOT PNL SERPL-MCNC: 0.1 MG/DL (ref 0–0.5)
BILIRUBIN DIRECT+TOT PNL SERPL-MCNC: 0.1 MG/DL (ref 0–0.8)
BUN SERPL-MCNC: 22 MG/DL (ref 9.8–20.1)
CALCIUM SERPL-MCNC: 9.4 MG/DL (ref 8.4–10.2)
CHLORIDE SERPL-SCNC: 100 MMOL/L (ref 98–107)
CO2 SERPL-SCNC: 27 MMOL/L (ref 23–31)
CREAT SERPL-MCNC: 1.06 MG/DL (ref 0.55–1.02)
EOSINOPHIL # BLD AUTO: 0 X10(3)/MCL (ref 0–0.9)
EOSINOPHIL NFR BLD AUTO: 0.7 %
ERYTHROCYTE [DISTWIDTH] IN BLOOD BY AUTOMATED COUNT: 15.1 % (ref 11.5–17)
GLOBULIN SER-MCNC: 2.9 GM/DL (ref 2.4–3.5)
GLUCOSE SERPL-MCNC: 103 MG/DL (ref 82–115)
HCT VFR BLD AUTO: 39.2 % (ref 37–47)
HGB BLD-MCNC: 12.7 GM/DL (ref 12–16)
LYMPHOCYTES # BLD AUTO: 2 X10(3)/MCL (ref 0.6–4.6)
LYMPHOCYTES NFR BLD AUTO: 27 %
MCH RBC QN AUTO: 32.4 PG (ref 27–31)
MCHC RBC AUTO-ENTMCNC: 32.4 GM/DL (ref 33–36)
MCV RBC AUTO: 100 FL (ref 80–94)
MONOCYTES # BLD AUTO: 0.7 X10(3)/MCL (ref 0.1–1.3)
MONOCYTES NFR BLD AUTO: 9.2 %
NEUTROPHILS # BLD AUTO: 4.7 X10(3)/MCL (ref 2.1–9.2)
NEUTROPHILS NFR BLD AUTO: 61.8 %
PLATELET # BLD AUTO: 233 X10(3)/MCL (ref 130–400)
PMV BLD AUTO: 8.6 FL (ref 9.4–12.4)
POTASSIUM SERPL-SCNC: 4.2 MMOL/L (ref 3.5–5.1)
PROT SERPL-MCNC: 6.6 GM/DL (ref 5.8–7.6)
RBC # BLD AUTO: 3.92 X10(6)/MCL (ref 4.2–5.4)
SODIUM SERPL-SCNC: 135 MMOL/L (ref 136–145)
WBC # SPEC AUTO: 7.5 X10(3)/MCL (ref 4.5–11.5)

## 2020-05-05 ENCOUNTER — HISTORICAL (OUTPATIENT)
Dept: INFUSION THERAPY | Facility: HOSPITAL | Age: 65
End: 2020-05-05

## 2020-05-05 LAB
ABS NEUT (OLG): 3.9 X10(3)/MCL (ref 2.1–9.2)
BASOPHILS # BLD AUTO: 0 X10(3)/MCL (ref 0–0.2)
BASOPHILS NFR BLD AUTO: 0.5 %
EOSINOPHIL # BLD AUTO: 0 X10(3)/MCL (ref 0–0.9)
EOSINOPHIL NFR BLD AUTO: 0.6 %
ERYTHROCYTE [DISTWIDTH] IN BLOOD BY AUTOMATED COUNT: 15.9 % (ref 11.5–17)
HCT VFR BLD AUTO: 38.5 % (ref 37–47)
HGB BLD-MCNC: 12.3 GM/DL (ref 12–16)
LYMPHOCYTES # BLD AUTO: 1.7 X10(3)/MCL (ref 0.6–4.6)
LYMPHOCYTES NFR BLD AUTO: 26.1 %
MCH RBC QN AUTO: 32.8 PG (ref 27–31)
MCHC RBC AUTO-ENTMCNC: 31.9 GM/DL (ref 33–36)
MCV RBC AUTO: 102.7 FL (ref 80–94)
MONOCYTES # BLD AUTO: 0.7 X10(3)/MCL (ref 0.1–1.3)
MONOCYTES NFR BLD AUTO: 11.1 %
NEUTROPHILS # BLD AUTO: 3.9 X10(3)/MCL (ref 2.1–9.2)
NEUTROPHILS NFR BLD AUTO: 61.2 %
PLATELET # BLD AUTO: 193 X10(3)/MCL (ref 130–400)
PMV BLD AUTO: 8.9 FL (ref 9.4–12.4)
RBC # BLD AUTO: 3.75 X10(6)/MCL (ref 4.2–5.4)
WBC # SPEC AUTO: 6.4 X10(3)/MCL (ref 4.5–11.5)

## 2020-05-07 ENCOUNTER — HISTORICAL (OUTPATIENT)
Dept: INFUSION THERAPY | Facility: HOSPITAL | Age: 65
End: 2020-05-07

## 2020-05-14 ENCOUNTER — HISTORICAL (OUTPATIENT)
Dept: HEMATOLOGY/ONCOLOGY | Facility: CLINIC | Age: 65
End: 2020-05-14

## 2020-05-14 LAB
ABS NEUT (OLG): 3.06 X10(3)/MCL (ref 2.1–9.2)
ALBUMIN SERPL-MCNC: 4.3 GM/DL (ref 3.4–5)
ALBUMIN/GLOB SERPL: 1.5 RATIO (ref 1.1–2)
ALP SERPL-CCNC: 77 UNIT/L (ref 40–150)
ALT SERPL-CCNC: 30 UNIT/L (ref 0–55)
AST SERPL-CCNC: 30 UNIT/L (ref 5–34)
BASOPHILS # BLD AUTO: 0 X10(3)/MCL (ref 0–0.2)
BASOPHILS NFR BLD AUTO: 0.3 %
BILIRUB SERPL-MCNC: 0.5 MG/DL
BILIRUBIN DIRECT+TOT PNL SERPL-MCNC: 0.2 MG/DL (ref 0–0.5)
BILIRUBIN DIRECT+TOT PNL SERPL-MCNC: 0.3 MG/DL (ref 0–0.8)
BUN SERPL-MCNC: 23.9 MG/DL (ref 9.8–20.1)
CALCIUM SERPL-MCNC: 9.6 MG/DL (ref 8.4–10.2)
CHLORIDE SERPL-SCNC: 104 MMOL/L (ref 98–107)
CO2 SERPL-SCNC: 19 MMOL/L (ref 23–31)
CREAT SERPL-MCNC: 1.38 MG/DL (ref 0.55–1.02)
EOSINOPHIL # BLD AUTO: 0.2 X10(3)/MCL (ref 0–0.9)
EOSINOPHIL NFR BLD AUTO: 3.3 %
ERYTHROCYTE [DISTWIDTH] IN BLOOD BY AUTOMATED COUNT: 14.9 % (ref 11.5–17)
GLOBULIN SER-MCNC: 2.9 GM/DL (ref 2.4–3.5)
GLUCOSE SERPL-MCNC: 107 MG/DL (ref 82–115)
HCT VFR BLD AUTO: 39.7 % (ref 37–47)
HGB BLD-MCNC: 12.9 GM/DL (ref 12–16)
LYMPHOCYTES # BLD AUTO: 2 X10(3)/MCL (ref 0.6–4.6)
LYMPHOCYTES NFR BLD AUTO: 34.8 %
MCH RBC QN AUTO: 32.4 PG (ref 27–31)
MCHC RBC AUTO-ENTMCNC: 32.5 GM/DL (ref 33–36)
MCV RBC AUTO: 99.7 FL (ref 80–94)
MONOCYTES # BLD AUTO: 0.5 X10(3)/MCL (ref 0.1–1.3)
MONOCYTES NFR BLD AUTO: 8.6 %
NEUTROPHILS # BLD AUTO: 3.1 X10(3)/MCL (ref 2.1–9.2)
NEUTROPHILS NFR BLD AUTO: 52.7 %
PLATELET # BLD AUTO: 179 X10(3)/MCL (ref 130–400)
PMV BLD AUTO: 9.2 FL (ref 9.4–12.4)
POTASSIUM SERPL-SCNC: 3.7 MMOL/L (ref 3.5–5.1)
PROT SERPL-MCNC: 7.2 GM/DL (ref 5.8–7.6)
RBC # BLD AUTO: 3.98 X10(6)/MCL (ref 4.2–5.4)
SODIUM SERPL-SCNC: 137 MMOL/L (ref 136–145)
WBC # SPEC AUTO: 5.8 X10(3)/MCL (ref 4.5–11.5)

## 2020-05-18 ENCOUNTER — HISTORICAL (OUTPATIENT)
Dept: HEMATOLOGY/ONCOLOGY | Facility: CLINIC | Age: 65
End: 2020-05-18

## 2020-05-18 LAB
ABS NEUT (OLG): 2.51 X10(3)/MCL (ref 2.1–9.2)
ANION GAP SERPL CALC-SCNC: 15 MMOL/L
BASOPHILS # BLD AUTO: 0 X10(3)/MCL (ref 0–0.2)
BASOPHILS NFR BLD AUTO: 0.2 %
BUN SERPL-MCNC: 23 MG/DL (ref 8–26)
CHLORIDE SERPL-SCNC: 102 MMOL/L (ref 98–109)
CREAT SERPL-MCNC: 1.4 MG/DL (ref 0.6–1.3)
EOSINOPHIL # BLD AUTO: 0.1 X10(3)/MCL (ref 0–0.9)
EOSINOPHIL NFR BLD AUTO: 2 %
ERYTHROCYTE [DISTWIDTH] IN BLOOD BY AUTOMATED COUNT: 15.3 % (ref 11.5–17)
GLUCOSE SERPL-MCNC: 97 MG/DL (ref 70–105)
HCT VFR BLD AUTO: 34.9 % (ref 37–47)
HCT VFR BLD CALC: 34 % (ref 38–51)
HGB BLD-MCNC: 11.5 GM/DL (ref 12–16)
HGB BLD-MCNC: 11.6 MG/DL (ref 12–17)
LYMPHOCYTES # BLD AUTO: 1.3 X10(3)/MCL (ref 0.6–4.6)
LYMPHOCYTES NFR BLD AUTO: 29.3 %
MCH RBC QN AUTO: 32.8 PG (ref 27–31)
MCHC RBC AUTO-ENTMCNC: 33 GM/DL (ref 33–36)
MCV RBC AUTO: 99.4 FL (ref 80–94)
MONOCYTES # BLD AUTO: 0.6 X10(3)/MCL (ref 0.1–1.3)
MONOCYTES NFR BLD AUTO: 12.6 %
NEUTROPHILS # BLD AUTO: 2.5 X10(3)/MCL (ref 2.1–9.2)
NEUTROPHILS NFR BLD AUTO: 55.7 %
PLATELET # BLD AUTO: 165 X10(3)/MCL (ref 130–400)
PMV BLD AUTO: 8.6 FL (ref 9.4–12.4)
POC IONIZED CALCIUM: 1.24 MMOL/L (ref 1.12–1.32)
POC TCO2: 22 MMOL/L (ref 24–29)
POTASSIUM BLD-SCNC: 3.2 MMOL/L (ref 3.5–4.9)
RBC # BLD AUTO: 3.51 X10(6)/MCL (ref 4.2–5.4)
SODIUM BLD-SCNC: 135 MMOL/L (ref 138–146)
WBC # SPEC AUTO: 4.5 X10(3)/MCL (ref 4.5–11.5)

## 2020-05-26 ENCOUNTER — HISTORICAL (OUTPATIENT)
Dept: INFUSION THERAPY | Facility: HOSPITAL | Age: 65
End: 2020-05-26

## 2020-05-26 LAB
ABS NEUT (OLG): 1.8 X10(3)/MCL (ref 2.1–9.2)
ALBUMIN SERPL-MCNC: 3.4 GM/DL (ref 3.4–5)
ALBUMIN/GLOB SERPL: 1.3 RATIO (ref 1.1–2)
ALP SERPL-CCNC: 56 UNIT/L (ref 40–150)
ALT SERPL-CCNC: 14 UNIT/L (ref 0–55)
AST SERPL-CCNC: 24 UNIT/L (ref 5–34)
BASOPHILS # BLD AUTO: 0 X10(3)/MCL (ref 0–0.2)
BASOPHILS NFR BLD AUTO: 0.7 %
BILIRUB SERPL-MCNC: 0.3 MG/DL
BILIRUBIN DIRECT+TOT PNL SERPL-MCNC: 0.1 MG/DL (ref 0–0.5)
BILIRUBIN DIRECT+TOT PNL SERPL-MCNC: 0.2 MG/DL (ref 0–0.8)
BUN SERPL-MCNC: 16.9 MG/DL (ref 9.8–20.1)
CALCIUM SERPL-MCNC: 8.9 MG/DL (ref 8.4–10.2)
CEA SERPL-MCNC: 19.65 MG/ML (ref 0–3)
CHLORIDE SERPL-SCNC: 104 MMOL/L (ref 98–107)
CO2 SERPL-SCNC: 24 MMOL/L (ref 23–31)
CREAT SERPL-MCNC: 1.14 MG/DL (ref 0.55–1.02)
EOSINOPHIL # BLD AUTO: 0.1 X10(3)/MCL (ref 0–0.9)
EOSINOPHIL NFR BLD AUTO: 1.8 %
ERYTHROCYTE [DISTWIDTH] IN BLOOD BY AUTOMATED COUNT: 16.8 % (ref 11.5–17)
GLOBULIN SER-MCNC: 2.7 GM/DL (ref 2.4–3.5)
GLUCOSE SERPL-MCNC: 84 MG/DL (ref 82–115)
HCT VFR BLD AUTO: 34.6 % (ref 37–47)
HGB BLD-MCNC: 11 GM/DL (ref 12–16)
LYMPHOCYTES # BLD AUTO: 1.7 X10(3)/MCL (ref 0.6–4.6)
LYMPHOCYTES NFR BLD AUTO: 37.3 %
MCH RBC QN AUTO: 33.2 PG (ref 27–31)
MCHC RBC AUTO-ENTMCNC: 31.8 GM/DL (ref 33–36)
MCV RBC AUTO: 104.5 FL (ref 80–94)
MONOCYTES # BLD AUTO: 0.8 X10(3)/MCL (ref 0.1–1.3)
MONOCYTES NFR BLD AUTO: 18.9 %
NEUTROPHILS # BLD AUTO: 1.8 X10(3)/MCL (ref 2.1–9.2)
NEUTROPHILS NFR BLD AUTO: 40.4 %
PLATELET # BLD AUTO: 221 X10(3)/MCL (ref 130–400)
PMV BLD AUTO: 8.6 FL (ref 9.4–12.4)
POTASSIUM SERPL-SCNC: 4.2 MMOL/L (ref 3.5–5.1)
PROT SERPL-MCNC: 6.1 GM/DL (ref 5.8–7.6)
RBC # BLD AUTO: 3.31 X10(6)/MCL (ref 4.2–5.4)
SODIUM SERPL-SCNC: 139 MMOL/L (ref 136–145)
WBC # SPEC AUTO: 4.4 X10(3)/MCL (ref 4.5–11.5)

## 2020-05-28 ENCOUNTER — HISTORICAL (OUTPATIENT)
Dept: INFUSION THERAPY | Facility: HOSPITAL | Age: 65
End: 2020-05-28

## 2020-06-02 ENCOUNTER — HISTORICAL (OUTPATIENT)
Dept: HEMATOLOGY/ONCOLOGY | Facility: CLINIC | Age: 65
End: 2020-06-02

## 2020-06-02 LAB
ABS NEUT (OLG): 1.8 X10(3)/MCL (ref 2.1–9.2)
ALBUMIN SERPL-MCNC: 3.8 GM/DL (ref 3.4–4.8)
ALBUMIN/GLOB SERPL: 1.3 RATIO (ref 1.1–2)
ALP SERPL-CCNC: 67 UNIT/L (ref 40–150)
ALT SERPL-CCNC: 35 UNIT/L (ref 0–55)
AST SERPL-CCNC: 28 UNIT/L (ref 5–34)
BASOPHILS # BLD AUTO: 0 X10(3)/MCL (ref 0–0.2)
BASOPHILS NFR BLD AUTO: 0.3 %
BILIRUB SERPL-MCNC: 0.4 MG/DL
BILIRUBIN DIRECT+TOT PNL SERPL-MCNC: 0.2 MG/DL (ref 0–0.5)
BILIRUBIN DIRECT+TOT PNL SERPL-MCNC: 0.2 MG/DL (ref 0–0.8)
BUN SERPL-MCNC: 20.8 MG/DL (ref 9.8–20.1)
CALCIUM SERPL-MCNC: 8.9 MG/DL (ref 8.4–10.2)
CHLORIDE SERPL-SCNC: 107 MMOL/L (ref 98–107)
CO2 SERPL-SCNC: 23 MMOL/L (ref 23–31)
CREAT SERPL-MCNC: 0.99 MG/DL (ref 0.55–1.02)
EOSINOPHIL # BLD AUTO: 0 X10(3)/MCL (ref 0–0.9)
EOSINOPHIL NFR BLD AUTO: 1.6 %
ERYTHROCYTE [DISTWIDTH] IN BLOOD BY AUTOMATED COUNT: 15.5 % (ref 11.5–17)
GLOBULIN SER-MCNC: 3 GM/DL (ref 2.4–3.5)
GLUCOSE SERPL-MCNC: 93 MG/DL (ref 82–115)
HCT VFR BLD AUTO: 36 % (ref 37–47)
HGB BLD-MCNC: 11.4 GM/DL (ref 12–16)
LYMPHOCYTES # BLD AUTO: 1.1 X10(3)/MCL (ref 0.6–4.6)
LYMPHOCYTES NFR BLD AUTO: 36.4 %
MCH RBC QN AUTO: 32.9 PG (ref 27–31)
MCHC RBC AUTO-ENTMCNC: 31.7 GM/DL (ref 33–36)
MCV RBC AUTO: 103.7 FL (ref 80–94)
MONOCYTES # BLD AUTO: 0.1 X10(3)/MCL (ref 0.1–1.3)
MONOCYTES NFR BLD AUTO: 2.9 %
NEUTROPHILS # BLD AUTO: 1.8 X10(3)/MCL (ref 2.1–9.2)
NEUTROPHILS NFR BLD AUTO: 58.5 %
PLATELET # BLD AUTO: 111 X10(3)/MCL (ref 130–400)
PMV BLD AUTO: 9.6 FL (ref 9.4–12.4)
POTASSIUM SERPL-SCNC: 4.4 MMOL/L (ref 3.5–5.1)
PROT SERPL-MCNC: 6.8 GM/DL (ref 5.8–7.6)
RBC # BLD AUTO: 3.47 X10(6)/MCL (ref 4.2–5.4)
SODIUM SERPL-SCNC: 140 MMOL/L (ref 136–145)
WBC # SPEC AUTO: 3.1 X10(3)/MCL (ref 4.5–11.5)

## 2020-06-09 ENCOUNTER — HISTORICAL (OUTPATIENT)
Dept: HEMATOLOGY/ONCOLOGY | Facility: CLINIC | Age: 65
End: 2020-06-09

## 2020-06-09 LAB
ABS NEUT (OLG): 1.93 X10(3)/MCL (ref 2.1–9.2)
ANION GAP SERPL CALC-SCNC: 14 MMOL/L
BASOPHILS # BLD AUTO: 0 X10(3)/MCL (ref 0–0.2)
BASOPHILS NFR BLD AUTO: 0.2 %
BUN SERPL-MCNC: 17 MG/DL (ref 8–26)
CHLORIDE SERPL-SCNC: 103 MMOL/L (ref 98–109)
CREAT SERPL-MCNC: 1.1 MG/DL (ref 0.6–1.3)
EOSINOPHIL # BLD AUTO: 0.1 X10(3)/MCL (ref 0–0.9)
EOSINOPHIL NFR BLD AUTO: 2.6 %
ERYTHROCYTE [DISTWIDTH] IN BLOOD BY AUTOMATED COUNT: 16.2 % (ref 11.5–17)
GLUCOSE SERPL-MCNC: 92 MG/DL (ref 70–105)
HCT VFR BLD AUTO: 32.3 % (ref 37–47)
HCT VFR BLD CALC: 31 % (ref 38–51)
HGB BLD-MCNC: 10.4 GM/DL (ref 12–16)
HGB BLD-MCNC: 10.5 MG/DL (ref 12–17)
LYMPHOCYTES # BLD AUTO: 1.7 X10(3)/MCL (ref 0.6–4.6)
LYMPHOCYTES NFR BLD AUTO: 39.8 %
MCH RBC QN AUTO: 33.3 PG (ref 27–31)
MCHC RBC AUTO-ENTMCNC: 32.2 GM/DL (ref 33–36)
MCV RBC AUTO: 103.5 FL (ref 80–94)
MONOCYTES # BLD AUTO: 0.5 X10(3)/MCL (ref 0.1–1.3)
MONOCYTES NFR BLD AUTO: 11.2 %
NEUTROPHILS # BLD AUTO: 1.9 X10(3)/MCL (ref 2.1–9.2)
NEUTROPHILS NFR BLD AUTO: 46 %
PLATELET # BLD AUTO: 154 X10(3)/MCL (ref 130–400)
PMV BLD AUTO: 9.4 FL (ref 9.4–12.4)
POC IONIZED CALCIUM: 1.23 MMOL/L (ref 1.12–1.32)
POC TCO2: 23 MMOL/L (ref 24–29)
POTASSIUM BLD-SCNC: 4.1 MMOL/L (ref 3.5–4.9)
RBC # BLD AUTO: 3.12 X10(6)/MCL (ref 4.2–5.4)
SODIUM BLD-SCNC: 135 MMOL/L (ref 138–146)
WBC # SPEC AUTO: 4.2 X10(3)/MCL (ref 4.5–11.5)

## 2020-06-12 ENCOUNTER — HISTORICAL (OUTPATIENT)
Dept: ADMINISTRATIVE | Facility: HOSPITAL | Age: 65
End: 2020-06-12

## 2020-06-12 LAB
ABS NEUT (OLG): 1.31 X10(3)/MCL (ref 2.1–9.2)
ALBUMIN SERPL-MCNC: 3.6 GM/DL (ref 3.4–4.8)
ALBUMIN/GLOB SERPL: 1.1 RATIO (ref 1.1–2)
ALP SERPL-CCNC: 63 UNIT/L (ref 40–150)
ALT SERPL-CCNC: 17 UNIT/L (ref 0–55)
ANISOCYTOSIS BLD QL SMEAR: ABNORMAL
AST SERPL-CCNC: 29 UNIT/L (ref 5–34)
BASOPHILS NFR BLD MANUAL: 0 % (ref 0–2)
BILIRUB SERPL-MCNC: 0.2 MG/DL (ref 0.2–1.2)
BILIRUBIN DIRECT+TOT PNL SERPL-MCNC: 0.1 MG/DL (ref 0–0.5)
BILIRUBIN DIRECT+TOT PNL SERPL-MCNC: 0.1 MG/DL (ref 0–0.8)
BUN SERPL-MCNC: 14 MG/DL (ref 9.8–20.1)
CALCIUM SERPL-MCNC: 9.6 MG/DL (ref 8.4–10.2)
CEA SERPL-MCNC: 20.7 MG/ML (ref 0–3)
CHLORIDE SERPL-SCNC: 105 MMOL/L (ref 98–107)
CO2 SERPL-SCNC: 22 MMOL/L (ref 23–31)
CREAT SERPL-MCNC: 1.02 MG/DL (ref 0.57–1.11)
EOSINOPHIL NFR BLD MANUAL: 4 % (ref 0–8)
ERYTHROCYTE [DISTWIDTH] IN BLOOD BY AUTOMATED COUNT: 17.2 % (ref 11.5–17)
GLOBULIN SER-MCNC: 3.4 GM/DL (ref 2.4–3.5)
GLUCOSE SERPL-MCNC: 85 MG/DL (ref 82–115)
GRANULOCYTES NFR BLD MANUAL: 34 % (ref 47–80)
HCT VFR BLD AUTO: 35.5 % (ref 37–47)
HGB BLD-MCNC: 11.4 GM/DL (ref 12–16)
HYPOCHROMIA BLD QL SMEAR: ABNORMAL
LYMPHOCYTES NFR BLD MANUAL: 42 % (ref 13–40)
MACROCYTES BLD QL SMEAR: ABNORMAL
MCH RBC QN AUTO: 33.4 PG (ref 27–31)
MCHC RBC AUTO-ENTMCNC: 32.1 GM/DL (ref 33–36)
MCV RBC AUTO: 104.1 FL (ref 80–94)
MONOCYTES NFR BLD MANUAL: 20 % (ref 2–11)
PLATELET # BLD AUTO: 308 X10(3)/MCL (ref 130–400)
PLATELET # BLD EST: ADEQUATE 10*3/UL
PMV BLD AUTO: 9.5 FL (ref 7.4–10.4)
POTASSIUM SERPL-SCNC: 4 MMOL/L (ref 3.5–5.1)
PROT SERPL-MCNC: 7 GM/DL (ref 5.8–7.6)
RBC # BLD AUTO: 3.41 X10(6)/MCL (ref 4.2–5.4)
RBC MORPH BLD: ABNORMAL
SODIUM SERPL-SCNC: 138 MMOL/L (ref 136–145)
WBC # SPEC AUTO: 3.5 X10(3)/MCL (ref 4.5–11.5)

## 2020-06-16 ENCOUNTER — HISTORICAL (OUTPATIENT)
Dept: INFUSION THERAPY | Facility: HOSPITAL | Age: 65
End: 2020-06-16

## 2020-06-18 ENCOUNTER — HISTORICAL (OUTPATIENT)
Dept: INFUSION THERAPY | Facility: HOSPITAL | Age: 65
End: 2020-06-18

## 2020-06-23 ENCOUNTER — HISTORICAL (OUTPATIENT)
Dept: HEMATOLOGY/ONCOLOGY | Facility: CLINIC | Age: 65
End: 2020-06-23

## 2020-06-23 LAB
ABS NEUT (OLG): 3.41 X10(3)/MCL (ref 2.1–9.2)
ALBUMIN SERPL-MCNC: 3.9 GM/DL (ref 3.4–5)
ALBUMIN/GLOB SERPL: 1.2 RATIO (ref 1.1–2)
ALP SERPL-CCNC: 85 UNIT/L (ref 40–150)
ALT SERPL-CCNC: 32 UNIT/L (ref 0–55)
AST SERPL-CCNC: 28 UNIT/L (ref 5–34)
BASOPHILS # BLD AUTO: 0 X10(3)/MCL (ref 0–0.2)
BASOPHILS NFR BLD AUTO: 0.4 %
BILIRUB SERPL-MCNC: 0.4 MG/DL
BILIRUBIN DIRECT+TOT PNL SERPL-MCNC: 0.1 MG/DL (ref 0–0.5)
BILIRUBIN DIRECT+TOT PNL SERPL-MCNC: 0.3 MG/DL (ref 0–0.8)
BUN SERPL-MCNC: 22.4 MG/DL (ref 9.8–20.1)
CALCIUM SERPL-MCNC: 9.3 MG/DL (ref 8.4–10.2)
CHLORIDE SERPL-SCNC: 106 MMOL/L (ref 98–107)
CO2 SERPL-SCNC: 23 MMOL/L (ref 23–31)
CREAT SERPL-MCNC: 0.96 MG/DL (ref 0.55–1.02)
EOSINOPHIL # BLD AUTO: 0 X10(3)/MCL (ref 0–0.9)
EOSINOPHIL NFR BLD AUTO: 0.6 %
ERYTHROCYTE [DISTWIDTH] IN BLOOD BY AUTOMATED COUNT: 15.7 % (ref 11.5–17)
GLOBULIN SER-MCNC: 3.2 GM/DL (ref 2.4–3.5)
GLUCOSE SERPL-MCNC: 97 MG/DL (ref 82–115)
HCT VFR BLD AUTO: 35.7 % (ref 37–47)
HGB BLD-MCNC: 11.3 GM/DL (ref 12–16)
LYMPHOCYTES # BLD AUTO: 1.4 X10(3)/MCL (ref 0.6–4.6)
LYMPHOCYTES NFR BLD AUTO: 28.5 %
MCH RBC QN AUTO: 32.8 PG (ref 27–31)
MCHC RBC AUTO-ENTMCNC: 31.7 GM/DL (ref 33–36)
MCV RBC AUTO: 103.5 FL (ref 80–94)
MONOCYTES # BLD AUTO: 0.2 X10(3)/MCL (ref 0.1–1.3)
MONOCYTES NFR BLD AUTO: 3 %
NEUTROPHILS # BLD AUTO: 3.4 X10(3)/MCL (ref 2.1–9.2)
NEUTROPHILS NFR BLD AUTO: 67.3 %
PLATELET # BLD AUTO: 146 X10(3)/MCL (ref 130–400)
PMV BLD AUTO: 9.3 FL (ref 9.4–12.4)
POTASSIUM SERPL-SCNC: 4.3 MMOL/L (ref 3.5–5.1)
PROT SERPL-MCNC: 7.1 GM/DL (ref 5.8–7.6)
RBC # BLD AUTO: 3.45 X10(6)/MCL (ref 4.2–5.4)
SODIUM SERPL-SCNC: 139 MMOL/L (ref 136–145)
WBC # SPEC AUTO: 5.1 X10(3)/MCL (ref 4.5–11.5)

## 2020-06-30 ENCOUNTER — HISTORICAL (OUTPATIENT)
Dept: HEMATOLOGY/ONCOLOGY | Facility: CLINIC | Age: 65
End: 2020-06-30

## 2020-06-30 LAB
ABS NEUT (OLG): 2.34 X10(3)/MCL (ref 2.1–9.2)
ANION GAP SERPL CALC-SCNC: 15 MMOL/L
BASOPHILS # BLD AUTO: 0 X10(3)/MCL (ref 0–0.2)
BASOPHILS NFR BLD AUTO: 0.2 %
BUN SERPL-MCNC: 19 MG/DL (ref 8–26)
CHLORIDE SERPL-SCNC: 103 MMOL/L (ref 98–109)
CREAT SERPL-MCNC: 1.1 MG/DL (ref 0.6–1.3)
EOSINOPHIL # BLD AUTO: 0 X10(3)/MCL (ref 0–0.9)
EOSINOPHIL NFR BLD AUTO: 0.9 %
ERYTHROCYTE [DISTWIDTH] IN BLOOD BY AUTOMATED COUNT: 17.1 % (ref 11.5–17)
GLUCOSE SERPL-MCNC: 80 MG/DL (ref 70–105)
HCT VFR BLD AUTO: 33.1 % (ref 37–47)
HCT VFR BLD CALC: 33 % (ref 38–51)
HGB BLD-MCNC: 10.4 GM/DL (ref 12–16)
HGB BLD-MCNC: 11.2 MG/DL (ref 12–17)
LYMPHOCYTES # BLD AUTO: 1.5 X10(3)/MCL (ref 0.6–4.6)
LYMPHOCYTES NFR BLD AUTO: 34.9 %
MCH RBC QN AUTO: 33.3 PG (ref 27–31)
MCHC RBC AUTO-ENTMCNC: 31.4 GM/DL (ref 33–36)
MCV RBC AUTO: 106.1 FL (ref 80–94)
MONOCYTES # BLD AUTO: 0.4 X10(3)/MCL (ref 0.1–1.3)
MONOCYTES NFR BLD AUTO: 10.3 %
NEUTROPHILS # BLD AUTO: 2.3 X10(3)/MCL (ref 2.1–9.2)
NEUTROPHILS NFR BLD AUTO: 53.7 %
PLATELET # BLD AUTO: 151 X10(3)/MCL (ref 130–400)
PMV BLD AUTO: 9 FL (ref 9.4–12.4)
POC IONIZED CALCIUM: 1.28 MMOL/L (ref 1.12–1.32)
POC TCO2: 26 MMOL/L (ref 24–29)
POTASSIUM BLD-SCNC: 4.3 MMOL/L (ref 3.5–4.9)
RBC # BLD AUTO: 3.12 X10(6)/MCL (ref 4.2–5.4)
SODIUM BLD-SCNC: 138 MMOL/L (ref 138–146)
WBC # SPEC AUTO: 4.4 X10(3)/MCL (ref 4.5–11.5)

## 2020-07-06 ENCOUNTER — HISTORICAL (OUTPATIENT)
Dept: ADMINISTRATIVE | Facility: HOSPITAL | Age: 65
End: 2020-07-06

## 2020-07-06 LAB
ABS NEUT (OLG): 0.86 X10(3)/MCL (ref 2.1–9.2)
ALBUMIN SERPL-MCNC: 3.4 GM/DL (ref 3.4–5)
ALBUMIN/GLOB SERPL: 1.2 RATIO (ref 1.1–2)
ALP SERPL-CCNC: 57 UNIT/L (ref 40–150)
ALT SERPL-CCNC: 12 UNIT/L (ref 0–55)
AST SERPL-CCNC: 24 UNIT/L (ref 5–34)
BASOPHILS # BLD AUTO: 0 X10(3)/MCL (ref 0–0.2)
BASOPHILS NFR BLD AUTO: 0.6 %
BILIRUB SERPL-MCNC: 0.3 MG/DL
BILIRUBIN DIRECT+TOT PNL SERPL-MCNC: 0.1 MG/DL (ref 0–0.5)
BILIRUBIN DIRECT+TOT PNL SERPL-MCNC: 0.2 MG/DL (ref 0–0.8)
BUN SERPL-MCNC: 28.4 MG/DL (ref 9.8–20.1)
CALCIUM SERPL-MCNC: 8.9 MG/DL (ref 8.4–10.2)
CEA SERPL-MCNC: 18.82 MG/ML (ref 0–3)
CHLORIDE SERPL-SCNC: 106 MMOL/L (ref 98–107)
CO2 SERPL-SCNC: 27 MMOL/L (ref 23–31)
CREAT SERPL-MCNC: 1.17 MG/DL (ref 0.55–1.02)
EOSINOPHIL # BLD AUTO: 0.1 X10(3)/MCL (ref 0–0.9)
EOSINOPHIL NFR BLD AUTO: 3.8 %
ERYTHROCYTE [DISTWIDTH] IN BLOOD BY AUTOMATED COUNT: 17.8 % (ref 11.5–17)
GLOBULIN SER-MCNC: 2.9 GM/DL (ref 2.4–3.5)
GLUCOSE SERPL-MCNC: 78 MG/DL (ref 82–115)
HCT VFR BLD AUTO: 33.6 % (ref 37–47)
HGB BLD-MCNC: 10.5 GM/DL (ref 12–16)
LYMPHOCYTES # BLD AUTO: 1.3 X10(3)/MCL (ref 0.6–4.6)
LYMPHOCYTES NFR BLD AUTO: 41.3 %
MCH RBC QN AUTO: 33.7 PG (ref 27–31)
MCHC RBC AUTO-ENTMCNC: 31.3 GM/DL (ref 33–36)
MCV RBC AUTO: 107.7 FL (ref 80–94)
MONOCYTES # BLD AUTO: 0.8 X10(3)/MCL (ref 0.1–1.3)
MONOCYTES NFR BLD AUTO: 26.8 %
NEUTROPHILS # BLD AUTO: 0.9 X10(3)/MCL (ref 2.1–9.2)
NEUTROPHILS NFR BLD AUTO: 27.2 %
PLATELET # BLD AUTO: 302 X10(3)/MCL (ref 130–400)
PMV BLD AUTO: 8.5 FL (ref 9.4–12.4)
POTASSIUM SERPL-SCNC: 4.8 MMOL/L (ref 3.5–5.1)
PROT SERPL-MCNC: 6.3 GM/DL (ref 5.8–7.6)
RBC # BLD AUTO: 3.12 X10(6)/MCL (ref 4.2–5.4)
SODIUM SERPL-SCNC: 139 MMOL/L (ref 136–145)
WBC # SPEC AUTO: 3.2 X10(3)/MCL (ref 4.5–11.5)

## 2020-07-15 ENCOUNTER — HISTORICAL (OUTPATIENT)
Dept: INFUSION THERAPY | Facility: HOSPITAL | Age: 65
End: 2020-07-15

## 2020-07-17 ENCOUNTER — HISTORICAL (OUTPATIENT)
Dept: INFUSION THERAPY | Facility: HOSPITAL | Age: 65
End: 2020-07-17

## 2020-07-22 ENCOUNTER — HISTORICAL (OUTPATIENT)
Dept: HEMATOLOGY/ONCOLOGY | Facility: CLINIC | Age: 65
End: 2020-07-22

## 2020-08-05 ENCOUNTER — HISTORICAL (OUTPATIENT)
Dept: INFUSION THERAPY | Facility: HOSPITAL | Age: 65
End: 2020-08-05

## 2020-08-07 ENCOUNTER — HISTORICAL (OUTPATIENT)
Dept: INFUSION THERAPY | Facility: HOSPITAL | Age: 65
End: 2020-08-07

## 2020-08-12 ENCOUNTER — HISTORICAL (OUTPATIENT)
Dept: HEMATOLOGY/ONCOLOGY | Facility: CLINIC | Age: 65
End: 2020-08-12

## 2020-08-18 ENCOUNTER — HISTORICAL (OUTPATIENT)
Dept: HEMATOLOGY/ONCOLOGY | Facility: CLINIC | Age: 65
End: 2020-08-18

## 2020-08-18 ENCOUNTER — HISTORICAL (OUTPATIENT)
Dept: ADMINISTRATIVE | Facility: HOSPITAL | Age: 65
End: 2020-08-18

## 2020-09-02 ENCOUNTER — HISTORICAL (OUTPATIENT)
Dept: INFUSION THERAPY | Facility: HOSPITAL | Age: 65
End: 2020-09-02

## 2020-09-04 ENCOUNTER — HISTORICAL (OUTPATIENT)
Dept: INFUSION THERAPY | Facility: HOSPITAL | Age: 65
End: 2020-09-04

## 2020-09-09 ENCOUNTER — HISTORICAL (OUTPATIENT)
Dept: HEMATOLOGY/ONCOLOGY | Facility: CLINIC | Age: 65
End: 2020-09-09

## 2020-09-17 ENCOUNTER — HISTORICAL (OUTPATIENT)
Dept: RADIOLOGY | Facility: HOSPITAL | Age: 65
End: 2020-09-17

## 2020-09-18 ENCOUNTER — HISTORICAL (OUTPATIENT)
Dept: HEMATOLOGY/ONCOLOGY | Facility: CLINIC | Age: 65
End: 2020-09-18

## 2020-10-22 ENCOUNTER — HISTORICAL (OUTPATIENT)
Dept: RADIOLOGY | Facility: HOSPITAL | Age: 65
End: 2020-10-22

## 2020-11-09 ENCOUNTER — HISTORICAL (OUTPATIENT)
Dept: ADMINISTRATIVE | Facility: HOSPITAL | Age: 65
End: 2020-11-09

## 2020-12-11 ENCOUNTER — HISTORICAL (OUTPATIENT)
Dept: INFUSION THERAPY | Facility: HOSPITAL | Age: 65
End: 2020-12-11

## 2020-12-31 ENCOUNTER — HISTORICAL (OUTPATIENT)
Dept: HEMATOLOGY/ONCOLOGY | Facility: CLINIC | Age: 65
End: 2020-12-31

## 2021-02-05 ENCOUNTER — HISTORICAL (OUTPATIENT)
Dept: INFUSION THERAPY | Facility: HOSPITAL | Age: 66
End: 2021-02-05

## 2021-03-01 ENCOUNTER — HISTORICAL (OUTPATIENT)
Dept: HEMATOLOGY/ONCOLOGY | Facility: CLINIC | Age: 66
End: 2021-03-01

## 2021-03-01 ENCOUNTER — HISTORICAL (OUTPATIENT)
Dept: ADMINISTRATIVE | Facility: HOSPITAL | Age: 66
End: 2021-03-01

## 2021-03-25 ENCOUNTER — HISTORICAL (OUTPATIENT)
Dept: HEMATOLOGY/ONCOLOGY | Facility: CLINIC | Age: 66
End: 2021-03-25

## 2021-04-01 ENCOUNTER — HISTORICAL (OUTPATIENT)
Dept: INFUSION THERAPY | Facility: HOSPITAL | Age: 66
End: 2021-04-01

## 2021-04-21 ENCOUNTER — HISTORICAL (OUTPATIENT)
Dept: HEMATOLOGY/ONCOLOGY | Facility: CLINIC | Age: 66
End: 2021-04-21

## 2021-05-27 ENCOUNTER — HISTORICAL (OUTPATIENT)
Dept: INFUSION THERAPY | Facility: HOSPITAL | Age: 66
End: 2021-05-27

## 2021-06-02 ENCOUNTER — HISTORICAL (OUTPATIENT)
Dept: HEMATOLOGY/ONCOLOGY | Facility: CLINIC | Age: 66
End: 2021-06-02

## 2021-07-20 ENCOUNTER — HISTORICAL (OUTPATIENT)
Dept: INFUSION THERAPY | Facility: HOSPITAL | Age: 66
End: 2021-07-20

## 2022-04-07 ENCOUNTER — HISTORICAL (OUTPATIENT)
Dept: ADMINISTRATIVE | Facility: HOSPITAL | Age: 67
End: 2022-04-07

## 2022-04-23 VITALS
HEIGHT: 60 IN | WEIGHT: 194 LBS | OXYGEN SATURATION: 96 % | BODY MASS INDEX: 38.09 KG/M2 | SYSTOLIC BLOOD PRESSURE: 128 MMHG | DIASTOLIC BLOOD PRESSURE: 74 MMHG

## 2022-04-30 NOTE — PROGRESS NOTES
Patient:   Terri Penny            MRN: [id number]            FIN: [id number]               Age:   63 years     Sex:  Female     :  1955   Associated Diagnoses:   None   Author:   Joyce RAMON, Cora CHO      Visit Information   PROBLEM LIST  1. 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 Patient had recurrence/mets to ovaries in . See note below for more details.  KRAS mutation +  2. Recurrent metastatic dz with  dz noted in the retroperitoneum with involvement of the proximal and distal ureter status post exploratory laparotomy with resection and lysis of adhesions and primary anastomosis of left ureter performed 2018 by Dr. Vega and Dr. Pugh.  3. Liver metastases along with mesenteric LAD.   4. Left hydroureteronephrosis s/p JJ stent placement during hospitalization in 2017.   5. Treatment induced diarrhea.  Stable  following dose reductions.      CURRENT THERAPY:  FOLFIRI  start date 18-- q 3 weeks (for tolerance)  Due for C#15  on 19-- continue 15% dose reduction of 5FU and levoleucovorin/25% dose  reduction of Irinotecan secondary to diarrhea.   Continue with IV hydration + Octreotide on Day 3 (hold Neulasta with this cycle)      TREATMENT HISTORY:  Adjuvant XELIRI + Bevacizumab initiated 11, completed 10/2011  FLOX chemotherapy 3/17/10, completed 8/11/10    HISTORY:  Terri Penny presented to the emergency room in [facility] 2/12/10, with an obstructed colon. A colonoscopy by Dr. Brambila revealed an obstructing friable lesion at 50cm in the region of the splenic flexure and descending colon. The scope was not able to be passed beyond the obstructing lesion. Dr. Zaragoza performed a colectomy with ileostomy placement as  well as a cholecystectomy and appendectomy. Pathology revealed infiltrating moderately differentiated mucinous adenocarcinoma with tumor extension through all layers of the bowel wall and into the pericolonic fat, free surgical margins, and 0 of 46 lymph nodes. Also, acute appendicitis with perforation and a periappendiceal abscess as well as acute cholecystitis were found. The patient initiated adjuvant FLOX chemotherapy 3/17/10 and completed treatment on 8/11/10. Her ileostomy was reversed 9/22/10. At her 2/10/11 follow-up visit the CEA had increased from 1/8 to 3.5. The CEA was repeated one month later found to have increased further to 4.5. She was referred for CT scans which revealed a 10.6 x 6.5 cm left adnexal mass. Mammogram 10/25/10 was benign. MRI of the abdomen 11/8/10 indicated an indeterminate lesion post segment of the right hepatic lobe thought to be a cyst was stable compared to prior scan. CXR was clear.    Because of the possibility of a new ovarian primary versus metastatic colon carcinoma revealed in the CT, she underwent resection by Dr. 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 She was initiated on adjuvant Bevacizumab, Irinotecan, and Xeloda 5/18/11. She did not experience diarrhea but did have several days of nausea that was well controlled with Zofran. No palmar/plantar complaints. Patient completed 8 courses of treatment on 10/14/11. Tumor was found positive for KRAS mutation and unresponsive to EGFR inhibitor agents.    CT abdomen/pelvis 8/29/11 revealed no new metastatic lesion. There were 2 lesions in the liver which were present in the 2/2010 films that were very slightly enlarged.    PET/CT 11/1/11 showed no metastasis. She did have vague uptake in the terminal ileum related to the ileitis for which she was admitted to [facility] 10/29/11 and resolved.    PET/CT 2/9/2012 showed no definite residual malignancy  or metastatic disease; new mildly increased patchy FDG uptake in the L-hepatic lobe indeterminate with no corresponding CT lesion. US was diagnostic of a hemangioma.    She underwent colonoscopy 6/22/2012 which revealed 2 small adenomatous polyps with no dysplasia. Dr. Vences recommended colonoscopy in one year.    PET/CT 2/2012: no evidence of disease.  PET/CT 8/27/12: no evidence of metastatic disease with incidental cystic structure in the left hemipelvis noted on prior exams.  PET/CT 12/5/12: low density cystic mass at left anterior adnexa, probable lymphocele no increased FDG uptake. No evidence of metastatic disease.  PET/CT 3/13: no evidence of disease. CEA=2.3  CT scans 9/26/2013 with no acute findings suspicious for recurrent disease. Stable simple-appearing cystic lesion in the left hemipelvis has not changed in size measuring 7 x 4.8 cm possibly representing postoperative seroma. CEA=2.4  Repeat PET/CT 1/10/14 (following rise in CEA to 4.0) showed LENNY.   CT scan of the abdomen 04/03/2014 secondary to abdominal pain showed partial small bowel obstruction  CT 7/16/14 of C/A/P:  no evidence of new metastases.  Post operative large seroma noted measuring 19 cm, along with left pelvic fluid collection.   CT scans done January 13, 2015 show approximately 1.4 cm hypodense lesion in the posterior portion of hepatic segment 7 which is more conspicuous on the current study but unchanged in size from the prior exam.  However, no comment was made of this lesion from the radiologist's report from the prior scans in July 2014.    CEA = 4.0 with prior CEA of 3.0  Restaging CT scans done 7/14/15--> LENNY. Stable benign right posterior liver lesion measuring 1.4 cm.  CEA WNL  repeat CEA from October 2015 = 4.  Repeat CT C/A/P done 02/03/16 LENNY--->02/03/16 CEA =4  Colonoscopy done July 19, 2016 was unremarkable.  CT scans of the chest abdomen pelvis done August 5, 2016 show slightly larger fluid collection anterior abdomen  with stable hypodensity posterior aspect of the right hepatic lobe.  No new lesions.   CEA from August 5, 2016 = 6  Repeat CEA from November 1, 2016 = 7, increased slightly from August.  Repeat CT scans on February 2, 2017 shows stable posterior right hepatic lobe density measuring 1.2 cm, with stable subcutaneous fluid collection, and stable left adnexal cystic lesion.  CEA = 8  Repeat CEA from May 5, 2017 stable = 8  Patient was admitted August 6, 2017 after presenting with abdominal pain that began the day before with subsequent  development of vomiting episodes.    CT scan done August 6, 2017 at the time of presentation revealed dilated loops of small bowel likely consistent with ileus or partial obstruction.  Redemonstration of moderate left hydroureteronephrosis with obstruction at the level of the pelvic inlet adjacent to the previously described soft tissue density and cystic lesion.  The patient had undergone restaging CT scans 2 days prior in August 4, 2017 as part of her routine follow-up visit with me which revealed 4 mm nodule along the right minor fissure likely benign etiology--with stable findings compared to prior scans.  Within the abdomen and pelvis, moderate left hydronephrosis with left ureter obstructed at the level of the pelvic inlet adjacent to a 5.1 x 6.4 cm cyst along with a 3.0 x 2.1 cm soft tissue density respectively.  Radiologist comments that the soft tissue density had measured 2.8 x 2.1 cm on prior scan from February 27, 2016.  However, when looking back at the radiology report from February 2016, no mention was made of such a lesion.  Urology and surgery were consulted.  Urology placed a JJ stent on the left and Dr. Zaragoza is a general surgery opted for conservative management, with subsequent resolution of her symptoms.  At the time of this consultation August 10, 2017, she is moving her bowels and eating well without difficulty with plan to discharge home and follow-up with   [place] for potential biopsy.  She then underwent CT-guided biopsy of this lesion at [facility] ordered by Dr. Vega on August 29, 2017.  Pathology showed abdominal seroma with scattered lymphocytes and fibrin and no evidence of malignancy.  CEA from 11/14/17 = 11 (previously 8).   CT of C/A/P on 11/14 shows similar size of LLQ cystic and solid soft tissue lesions.   New/additional prominent mesenteric LN measuring 11 mm.    Improved hydronephrosis on the left.    PET/CT done November 28, 2017 shows previously described mesenteric lymph node with stable size without any appreciable FDG activity but with 2 cm focus of hypermetabolism at nearby segment of small salud with SUV uptake of l 5.4.  Additional 3 cm mass in the left lower quadrant which is hypermetabolic with SUV uptake of 11.6.  Additional 6 cm left lower quadrant cystic mass with no areas of hypermetabolism.  Patient was then referred back to Dr. Mihir Vega after her visit in late November, and then underwent exploratory laparotomy on femora 22nd 2018. She underwent extensive lysis of adhesions with resection of a retroperitoneal mass along with additional small bowel resection, as well as resection and primary anastomosis of left ureter. Pathology revealed metastatic adenocarcinoma involving the proximal and distal ureter, as well as metastatic adenocarcinoma from the primary retroperitoneal mass in addition to additional pieces of fibroadipose tissue also involved by disease.  The patient went home on February 26, 2018 and initially did well. She was readmitted to the hospital on February 28, 2018 secondary to worsening diarrhea and concern over possible obstruction. CT scans of the abdomen and pelvis were done which demonstrated multiple new hepatic mass lesions concerning for metastases. Postsurgical changes noted with small bowel loops suggestive of postoperative ileus.  The patient was managed conservatively/expectantly and discharged home  "March 5, 2018 in stable condition.  She was then set up to come back to see me March 8, 2018 in clinic.  Restaging CT chest done 3/20/18 showed No evidence of mets.    CEA =25.  Iron studies c/w iron deficiency.   Patient was then started on FOLFIRI as noted above with Cyramza added with cycle 3.    Restaging CT scans of chest/abdomen/pelvis performed 7/20/18 noted persistent hepatice metastases which appear stable when compared to prior examination, postsurgical changes from prior left hemicolectomy with no evidence of residual or recurrent disease, seruma noted within the anterior soft tissues; corresponding CEA 8.0  Repeat CEA valentin 9/18/18 --4.9  Repeat CEA on 9/25/18--5.0  Repeat CEA on 10/17/18 --4.9  Restaging CT scans C/A/P done 12/3/18:   stable hypodense liver lesions noted, all uchanged, with the largest measuring 1.4 cm in segment 7, with no new or enlarging lesions elsewhere.    CEA = 4.9  Repeat CEA on 12/26 noted at 4.5  Repeat CEA on 1/17/19 noted at 4.6      Past medical history: Colon cancer with ovarian metastases, benign liver lesions, HTN, stress incontinence, osteoarthritis, GERD, umbilical hernia, small bowel obstruction secondary to hernia.  Procedure history: Incisional hernia repair 6/4/14, knee arthroplasty, colon resection, appendectomy, cholecystectomy, hysterectomy, colonoscopy, ileostomy and removal.   Auditory laparotomy February 22, 2018 as described above  Family history: Motherbreast cancer, fatherskin cancer  Social history: Patient lives at home with spouse. Retired. Regular diet, no exercise. Denies substance/tobacco use, never smoker.         Chief Complaint    Follow Up      Interval History   Patient presents to clinic today for scheduled follow up- currently due for Cycle #15 of treatment on 1/23/19. She recently had an unexpected death in the family and is understandably very emotional today. Clinically she is "doing fine." Denies worsening diarrhea- symptoms stil well " managed with prescribed medications. Denies HA, SOB, CP, N/V/C. No melena or bright red blood per rectum. No recent fevers, chills or other signs of infection. Appetite is stable as is energy. No new mouth sores. No paresthesias. No new neurological symptoms. Labs today are stable and unremarkable. Denies need for medication refill at this time. No other questions noted.             Review of Systems   14 point review systems done full with pertinent positives as described in interval history. Otherwise reviewed and unremarkable.      Health Status   Allergies:    Allergic Reactions (Selected)  Severity Not Documented  Percocet 2.5/325- Nausea.,    Allergies (1) Active Reaction  Percocet 2.5/325 nausea   Current medications:  (Selected)   Outpatient Medications  Ordered  Aloxi (for IVPB): 0.25 mg, IV Piggyback, Once-chemo, Infuse over: 0.3 hr, first dose 01/02/19 9:40:00 CST, stop date 01/02/19 9:40:00 CST, Days 1  Benadryl 50mg/ml Inj: 25 mg, IV Piggyback, Once-chemo, Infuse over: 20 minute(s), first dose 01/02/19 9:40:00 CST, stop date 01/02/19 9:40:00 CST, Routine, Days 1  CCA Atropine 0.4 mg/mL PF Injection: 0.25 mg, IV Piggyback, Once-chemo, Infuse over: 20 minute(s), first dose 01/02/19 9:40:00 CST, stop date 01/02/19 9:40:00 CST, Days 1  Camptosar (for IVPB): 250 mg, IV Piggyback, Once-chemo, Infuse over: 1.5 hr, first dose 01/02/19 10:00:00 CST, stop date 01/02/19 10:00:00 CST, Days 1  Fusilev (for IVPB): 300 mg, IV Piggyback, Once-chemo, Infuse over: 1.5 hr, first dose 01/02/19 10:00:00 CST, stop date 01/02/19 10:00:00 CST, Days 1  Sodium Chloride 0.9% 1000mL: form: Infusion, IV Piggyback, Once-chemo, Infuse over: 2 hr, first dose 05/11/18 12:50:00 CDT, stop date 05/11/18 12:50:00 CDT, Days 1  dexamethasone (for IVPB): 10 mg, IV Piggyback, Once-chemo, Infuse over: 20 minute(s), first dose 01/02/19 9:40:00 CST, stop date 01/02/19 9:40:00 CST, Days 1  fluorouracil (for IVPB) -CCA: 3,750 mg, IV Piggyback,  Once-chemo, Infuse over: 46 hr, first dose 01/02/19 14:00:00 CST, stop date 01/02/19 14:00:00 CST, Days 1  fluorouracil (for IVPB) -CCA: 625 mg, IV Piggyback, Once-chemo, Infuse over: 30 minute(s), first dose 01/02/19 11:30:00 CST, stop date 01/02/19 11:30:00 CST, Days 1  fosaprepitant (for IVPB): 150 mg, form: Powder-Inj, IV Piggyback, Once-chemo, Infuse over: 30 minute(s), first dose 01/02/19 9:40:00 CST, stop date 01/02/19 9:40:00 CST, Days 1  Future  Aloxi (for IVPB): 0.25 mg, IV Piggyback, Once-chemo, Infuse over: 0.3 hr, *Est. first dose 01/23/19 11:43:00 CST, *Est. stop date 01/23/19 11:43:00 CST, Future Order, Days 1  Benadryl 50mg/ml Inj: 25 mg, IV Piggyback, Once-chemo, Infuse over: 20 minute(s), *Est. first dose 01/23/19 11:43:00 CST, *Est. stop date 01/23/19 11:43:00 CST, Routine, Days 1, Future Order  CCA 1/2 Normal Saline (0.45% NS) 1000 mL: form: Infusion, IV Piggyback, Once-chemo, Infuse over: 2 hr, *Est. first dose 01/24/19 11:03:00 CST, *Est. stop date 01/24/19 11:03:00 CST, Future Order, Days 3  CCA Atropine 0.4 mg/mL PF Injection: 0.25 mg, IV Piggyback, Once-chemo, Infuse over: 20 minute(s), *Est. first dose 01/23/19 11:43:00 CST, *Est. stop date 01/23/19 11:43:00 CST, Future Order, Days 1  Camptosar (for IVPB): 250 mg, IV Piggyback, Once-chemo, Infuse over: 1.5 hr, *Est. first dose 01/23/19 12:03:00 CST, *Est. stop date 01/23/19 12:03:00 CST, Future Order, Days 1  Cyramza (for IVPB): 700 mg, IV Piggyback, Once, Infuse over: 60 minute(s), *Est. first dose 01/23/19 12:00:00 CST, *Est. stop date 01/23/19 12:00:00 CST, Future Order, Use of 0.22 Micron in-line filter, Days 1  Fusilev (for IVPB): 300 mg, IV Piggyback, Once-chemo, Infuse over: 1.5 hr, *Est. first dose 01/23/19 12:03:00 CST, *Est. stop date 01/23/19 12:03:00 CST, Future Order, Days 1  Heparin Flush 100 U/mL - 5 mL: 500 units, form: Injection, IV Push, Once-chemo, *Est. first dose 01/24/19 11:03:00 CST, *Est. stop date 01/24/19 11:03:00  CST, Routine, Days 3, Future Order  dexamethasone (for IVPB): 10 mg, IV Piggyback, Once-chemo, Infuse over: 20 minute(s), *Est. first dose 19 11:43:00 CST, *Est. stop date 19 11:43:00 CST, Future Order, Days 1  fluorouracil (for IVPB) -CCA: 3,750 mg, IV Piggyback, Once-chemo, Infuse over: 46 hr, *Est. first dose 19 16:03:00 CST, *Est. stop date 19 16:03:00 CST, Future Order, Days 1  fluorouracil (for IVPB) -CCA: 625 mg, IV Piggyback, Once-chemo, Infuse over: 30 minute(s), *Est. first dose 19 13:33:00 CST, *Est. stop date 19 13:33:00 CST, Future Order, Days 1  fosaprepitant (for IVPB): 150 mg, form: Powder-Inj, IV Piggyback, Once-chemo, Infuse over: 30 minute(s), *Est. first dose 19 11:43:00 CST, *Est. stop date 19 11:43:00 CST, Future Order, Days 1  octreotide 100 mcg/mL injectable solution: 100 mcg, form: Injection, IV Piggyback, Once, Infuse over: 40 minute(s), *Est. first dose 19 12:00:00 CST, *Est. stop date 19 12:00:00 CST, Routine, to be added to IV hydration-, Days 3, Future Order  Prescriptions  Prescribed  Lomotil 2.5 mg-0.025 mg oral tablet: 2 tab(s), Oral, q6hr, every 6 hours as needed till diarrhea is controlled, # 30 tab(s), 2 Refill(s), Pharmacy: [facility]/pharmacy #[id number]  Phenergan 25 mg Tab: 25 mg = 1 tab(s), Oral, q6hr, # 60 tab(s), 3 Refill(s), Pharmacy: [place] [id number] [id number], Patient Education Provided, Patient Verbalizes Understanding  Zofran 8 mg oral tablet: See Instructions, 1 tab(s) Oral BID for 3 days after chemotherapy and q8hr PRN nausea, # 60 tab(s), 1 Refill(s), Pharmacy: [id number]  Zofran ODT 8 mg oral tablet, disintegratin mg tabs, Oral, q6hr, PRN PRN nausea/vomiting, # 15 tab(s), 1 Refill(s), Pharmacy: [id number]  ZyPREXA 10 mg oral tablet: See Instructions, 1 tab(s) Oral morning of and Daily for 2 days after chemotherapy, # 18 tab(s), 0 Refill(s), Pharmacy: [facility]/pharmacy #[id number]  Documented  Medications  Documented  AMOX-CLAV 875MG TABLETS: = 1 tab(s), Oral, BID  Multivitamins and Minerals oral tablet: 0 Refill(s)  hydrochlorothiazide-valsartan 12.5 mg-80 mg oral tablet: 1 tab(s), Oral, Daily, # 30 tab(s), 0 Refill(s)   Problem list:    All Problems  Benign neoplasm of liver / SNOMED CT 8645842331 / Confirmed  Hypertension / ICD-9-.9 / Confirmed  Cancer of descending colon / SNOMED CT 840163188 / Confirmed  Morbid obesity / SNOMED CT 065282497 / Probable  Polyp of colon / SNOMED CT 695892147 / Confirmed  Potential for infection / SNOMED CT 199951077 / Confirmed  Review of care plan / SNOMED CT 5759863310 / Confirmed  Review of care plan / SNOMED CT 0483071107 / Confirmed  Liver metastases / SNOMED CT 832547202 / Confirmed  Ovarian metastasis / SNOMED CT 141514158 / Confirmed  Valvular heart disease / SNOMED CT 6576996 / Confirmed  Inactive: Alteration in nutrition / SNOMED CT 799345234  Problem automatically updated based on documentation on Nutritional Risk Factors or  Unintentional Weight Loss.  Inactive: At risk for aspiration / SNOMED CT 3938130703  Problem automatically updated based on documentation on Cough, Oxygen Therapy, Aspiration Risk, CN IX, X Swallowing Gag Reflex, GI Symptoms, Nutrition Risk Factors, and/or Enteral Tube Type.  Inactive: At risk for falls / SNOMED CT 759554852  Problem automatically updated based on documentation on History of Falls, History of Falls in last 3 months Florentino, Florentino Fall Risk Score and/or ADLs.  Inactive: At risk for nutritional problem / SNOMED CT 043358917  Problem automatically updated based on documentation on Nutritional Risk Factors or  Unintentional Weight Loss.  Inactive: At risk of healthcare associated infection / SNOMED CT 7508773520  Problem automatically updated based on Central IV Access Type, Arterial Line Site, PA Catheter Type, Pacemaker Type.  Inactive: At risk of pressure sore / SNOMED CT 958171187  Inactive: Bowel dysfunction /  SNOMED CT 123383030  Problem added automatically by system based on documentation of Bowel Sounds as Hyperactive, Hyperactive, or Absent;  GI Symptoms as Hyperactive, Constipation, or Diarrhea and/or Passing Flatus as No.  Problem added automatically by system based on documentation of Bowel Sounds as Hyperactive, Hyperactive, or Absent;  GI Symptoms as Hyperactive, Constipation, or Diarrhea and/or Passing Flatus as No.  Inactive: Disorder of fluid balance / SNOMED CT 748145061  Problem automatically updated based on documentation on Edema, Anasarca, Edema, Generalized, Edema, Bilateral Periorbital, Edema, Face, Edema,, Bilateral Arm, Edema, Left Arm, Edema, Right Arm, Edema, Bilateral Hand, Edema, Left Hand, Edema, Right Hand, Edema, Labia, Edema, Scrotum, Edema, Penile, Edema, Bilateral Upper Leg, Edema, Bilateral Lower Leg, Edema, Bilateral Pretibial, Edema, Bilateral Ankle, Edema, Bilateral Pedal, GI Symptoms, Skin Turgor General, and/or Gestational Age Method.  Resolved: Acid reflux / SNOMED CT 754300868  Resolved: Alteration in patterns of urinary elimination / SNYummly CT 94675820  Problem automatically updated based on documentation on Genitourinary Symptoms and/or Urinary Elimination.  Resolved: At risk of UTI / SNOMED CT 751683375  Problem automatically updated based on documentation on Urinary Elimination.  Resolved: CA - cancer of colon / SNOMED CT [id number]  Resolved: Chemotherapy / SNOMED CT 033231370  Resolved: Heartburn / SNOMED CT [id number]  Resolved: Osteoarthritis / SNOMED CT 5119859755  Resolved: Pregnant / SNOMED CT 701027329  Resolved: Pregnant / SNOMED CT 590918650  Resolved: Pregnant / SNOMED CT 803223029  Resolved: Sensitive to cold / SNOMED CT 1379753741  Resolved: Sensitive to heat / SNOMED CT 7991596845  Resolved: Stress incontinence / ICD-9-.6  Resolved: Umbilical hernia / ICD-9-.1  Canceled: Radiation therapy care / SNOMED  CT 6819544570,    Active Problems (11)  Benign neoplasm of liver   Cancer of descending colon   Hypertension   Liver metastases   Morbid obesity   Ovarian metastasis   Polyp of colon   Potential for infection   Review of care plan   Review of care plan   Valvular heart disease       Physical Examination   Vital Signs   1/21/2019 10:44 CST      Temperature Oral          36.7 DegC                             Temperature Oral (calculated)             98.06 DegF                             Peripheral Pulse Rate     89 bpm                             Systolic Blood Pressure   138 mmHg                             Diastolic Blood Pressure  87 mmHg                             O2 SAT at rest            100 %     Measurements from flowsheet : Measurements   1/21/2019 10:44 CST      Weight Dosing             87.2 kg                             Weight Measured           87.2 kg                             Weight Measured and Calculated in Lbs     192.24 lb                             Height/Length Dosing      152 cm                             Height/Length Measured    152 cm                             BSA Measured              1.92 m2                             Body Mass Index Measured  37.74 kg/m2     General:  Alert and oriented, No acute distress.    Eye:  Pupils are equal, round and reactive to light, Normal conjunctiva, Vision unchanged.    HENT:  Normocephalic, Oral mucosa is moist.    Neck:  Supple, Non-tender, No lymphadenopathy.    Respiratory:  Lungs are clear to auscultation, Respirations are non-labored, Breath sounds are equal.    Cardiovascular:  Normal rate, Regular rhythm, No edema.    Gastrointestinal:  Soft, Non-tender, Non-distended, Normal bowel sounds, Midline incision well healed   .    Genitourinary:  No inguinal tenderness.    Lymphatics:  No lymphadenopathy neck, axilla, groin.    Musculoskeletal:  No deformity, Normal gait.    Integumentary:  Warm, Dry, Intact.    Neurologic:  Alert, Oriented, No  focal deficits.    Cognition and Speech:  Speech clear and coherent.    Psychiatric:  Cooperative, Appropriate mood & affect.    ECOG Performance Scale: 1- Strenuous physical activity restricted; fully ambulatory and able to carry out light work.      Review / Management   Results review:  Lab results   1/21/2019 10:39 CST      POC TCO2                  24.0 mmol/L                             POC Hb                    14.6 mg/dL                             POC Hct                   43.0 %                             POC Sodium                137 mmol/L  LOW                             POC Potassium             4.2 mmol/L                             POC Chloride              104 mmol/L                             POC Ion Calcium           1.12 mmol/L                             POC Glucose               97 mg/dL                             POC BUN                   18.0 mg/dL                             POC Creatinine            1.0 mg/dL                             POC AGAP                  15.0  NA    1/21/2019 10:32 CST      WBC                       10.3 x10(3)/mcL                             RBC                       4.22 x10(6)/mcL                             Hgb                       13.6 gm/dL                             Hct                       43.4 %                             Platelet                  245 x10(3)/mcL                             MCV                       102.8 fL  HI                             MCH                       32.2 pg  HI                             MCHC                      31.3 gm/dL  LOW                             RDW                       15.0 %                             MPV                       9.3 fL  LOW                             Abs Neut                  6.95 x10(3)/mcL                             NEUT%                     67.4 %  NA                             NEUT#                     7.0 x10(3)/mcL                             LYMPH%                    24.3 %  NA                              LYMPH#                    2.5 x10(3)/mcL                             MONO%                     6.5 %  NA                             MONO#                     0.7 x10(3)/mcL                             EOS%                      0.8 %  NA                             EOS#                      0.1 x10(3)/mcL                             BASO%                     0.3 %  NA                             BASO#                     0.0 x10(3)/mcL  .       Impression and Plan   Diagnosis     1. 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 Patient had recurrence/mets to ovaries in 2011. See note below for more details.  KRAS mutation +  2. Recurrent metastatic dz with  dz noted in the retroperitoneum with involvement of the proximal and distal ureter status post exploratory laparotomy with resection and lysis of adhesions and primary anastomosis of left ureter performed February 22nd 2018 by Dr. Gary Pugh.  3. Liver metastases along with mesenteric LAD.   4. Left hydroureteronephrosis s/p JJ stent placement during hospitalization in August 2017.   5. Treatment induced diarrhea.    Improved   .     Plan:  Will continue current regimen of FOLFIRI + Zaltrap until dz progression or intolerance--> continue with 25% dose reduction of irinotecan, and 15% reduction of 5FU/Leukovorin   OK to proceed with cycle #15 as scheduled on 1/23/19  IV hydration and  Octreotide to be administered on Day 3. Continue to HOLD neulasta with this cycle  Continue PRN management of intermittent diarrhea   Continue compazine and zyprexa prn for nausea.   Maintain adequate nutrition/hydration daily   Continue with biotene/ warm water, salt, baking soda rinses TID  Continue Vitamin b complex and glutamine powder daily    RTC weekly for labs   RTC in 3 weeks for TD visit prior to Cycle #16  CBC, BMP same day lab   Call clinic with any questions or concerns       Cora CARDONA, AQUILINO-C     .

## 2022-04-30 NOTE — PROGRESS NOTES
Patient:   Terri Penny            MRN: [id number]            FIN: [id number]               Age:   64 years     Sex:  Female     :  1955   Associated Diagnoses:   None   Author:   James RAMON, Cora DUQUE      Visit Information   PROBLEM LIST  1. 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 Patient had recurrence/mets to ovaries in . See note below for more details.  KRAS mutation +  2. Recurrent metastatic dz with  dz noted in the retroperitoneum with involvement of the proximal and distal ureter status post exploratory laparotomy with resection and lysis of adhesions and primary anastomosis of left ureter performed 2018 by Dr. Vega and Dr. Pugh.  3. Liver metastases along with mesenteric LAD.   4. Left hydroureteronephrosis s/p JJ stent placement during hospitalization in 2017.   5. Treatment induced diarrhea.  Much improved since stopping chemo.         CURRENT THERAPY:  1.  Started FOLFOX and bevacizumab on 3/24/2020- due for Cycle #6 on 2020, HELD s/t neutropenia--- rescheduled for 2020    TREATMENT HISTORY:  1.  Adjuvant XELIRI + Bevacizumab initiated 11, completed 10/2011  2.  FLOX chemotherapy 3/17/10, completed 8/11/10  3.  FOLFIRI plus Cyramza started 4-18 given every 3 weeks for tolerance.  Completed 23 cycles as of 2019.  Treatment was then discontinued based on worsening side effects and patient was given a treatment break.  4.  FOLFIRI plus Cyramza reinitiated cycle 24 on 12/10/2019 due to progressive disease in her liver -- Due for Cycle #28 on 3/10/2020 however discontinued due to disease progression.      HISTORY:  Terri Penny presented to the emergency room in [facility] 2/12/10, with an obstructed  colon. A colonoscopy by Dr. Brambila revealed an obstructing friable lesion at 50cm in the region of the splenic flexure and descending colon. The scope was not able to be passed beyond the obstructing lesion. Dr. Zaragoza performed a colectomy with ileostomy placement as well as a cholecystectomy and appendectomy. Pathology revealed infiltrating moderately differentiated mucinous adenocarcinoma with tumor extension through all layers of the bowel wall and into the pericolonic fat, free surgical margins, and 0 of 46 lymph nodes. Also, acute appendicitis with perforation and a periappendiceal abscess as well as acute cholecystitis were found. The patient initiated adjuvant FLOX chemotherapy 3/17/10 and completed treatment on 8/11/10. Her ileostomy was reversed 9/22/10. At her 2/10/11 follow-up visit the CEA had increased from 1/8 to 3.5. The CEA was repeated one month later found to have increased further to 4.5. She was referred for CT scans which revealed a 10.6 x 6.5 cm left adnexal mass. Mammogram 10/25/10 was benign. MRI of the abdomen 11/8/10 indicated an indeterminate lesion post segment of the right hepatic lobe thought to be a cyst was stable compared to prior scan. CXR was clear.    Because of the possibility of a new ovarian primary versus metastatic colon carcinoma revealed in the CT, she underwent resection by Dr. 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 She was initiated on adjuvant Bevacizumab, Irinotecan, and Xeloda 5/18/11. She did not experience diarrhea but did have several days of nausea that was well controlled with Zofran. No palmar/plantar complaints. Patient completed 8 courses of treatment on 10/14/11. Tumor was found positive for KRAS mutation and unresponsive to EGFR inhibitor agents.    CT abdomen/pelvis 8/29/11 revealed no new metastatic lesion. There were 2 lesions in the liver which were present in the  2/2010 films that were very slightly enlarged.    PET/CT 11/1/11 showed no metastasis. She did have vague uptake in the terminal ileum related to the ileitis for which she was admitted to [facility] 10/29/11 and resolved.    PET/CT 2/9/2012 showed no definite residual malignancy or metastatic disease; new mildly increased patchy FDG uptake in the L-hepatic lobe indeterminate with no corresponding CT lesion. US was diagnostic of a hemangioma.    She underwent colonoscopy 6/22/2012 which revealed 2 small adenomatous polyps with no dysplasia. Dr. Vences recommended colonoscopy in one year.    PET/CT 2/2012: no evidence of disease.  PET/CT 8/27/12: no evidence of metastatic disease with incidental cystic structure in the left hemipelvis noted on prior exams.  PET/CT 12/5/12: low density cystic mass at left anterior adnexa, probable lymphocele no increased FDG uptake. No evidence of metastatic disease.  PET/CT 3/13: no evidence of disease. CEA=2.3  CT scans 9/26/2013 with no acute findings suspicious for recurrent disease. Stable simple-appearing cystic lesion in the left hemipelvis has not changed in size measuring 7 x 4.8 cm possibly representing postoperative seroma. CEA=2.4  Repeat PET/CT 1/10/14 (following rise in CEA to 4.0) showed LENNY.   CT scan of the abdomen 04/03/2014 secondary to abdominal pain showed partial small bowel obstruction  CT 7/16/14 of C/A/P:  no evidence of new metastases.  Post operative large seroma noted measuring 19 cm, along with left pelvic fluid collection.   CT scans done January 13, 2015 show approximately 1.4 cm hypodense lesion in the posterior portion of hepatic segment 7 which is more conspicuous on the current study but unchanged in size from the prior exam.  However, no comment was made of this lesion from the radiologist's report from the prior scans in July 2014.    CEA = 4.0 with prior CEA of 3.0  Restaging CT scans done 7/14/15--> LENNY. Stable benign right posterior liver lesion  measuring 1.4 cm.  CEA WNL  repeat CEA from October 2015 = 4.  Repeat CT C/A/P done 02/03/16 LENNY--->02/03/16 CEA =4  Colonoscopy done July 19, 2016 was unremarkable.  CT scans of the chest abdomen pelvis done August 5, 2016 show slightly larger fluid collection anterior abdomen with stable hypodensity posterior aspect of the right hepatic lobe.  No new lesions.   CEA from August 5, 2016 = 6  Repeat CEA from November 1, 2016 = 7, increased slightly from August.  Repeat CT scans on February 2, 2017 shows stable posterior right hepatic lobe density measuring 1.2 cm, with stable subcutaneous fluid collection, and stable left adnexal cystic lesion.  CEA = 8  Repeat CEA from May 5, 2017 stable = 8  Patient was admitted August 6, 2017 after presenting with abdominal pain that began the day before with subsequent  development of vomiting episodes.    CT scan done August 6, 2017 at the time of presentation revealed dilated loops of small bowel likely consistent with ileus or partial obstruction.  Redemonstration of moderate left hydroureteronephrosis with obstruction at the level of the pelvic inlet adjacent to the previously described soft tissue density and cystic lesion.  The patient had undergone restaging CT scans 2 days prior in August 4, 2017 as part of her routine follow-up visit with me which revealed 4 mm nodule along the right minor fissure likely benign etiology--with stable findings compared to prior scans.  Within the abdomen and pelvis, moderate left hydronephrosis with left ureter obstructed at the level of the pelvic inlet adjacent to a 5.1 x 6.4 cm cyst along with a 3.0 x 2.1 cm soft tissue density respectively.  Radiologist comments that the soft tissue density had measured 2.8 x 2.1 cm on prior scan from February 27, 2016.  However, when looking back at the radiology report from February 2016, no mention was made of such a lesion.  Urology and surgery were consulted.  Urology placed a JJ stent on the left  and Dr. Zaragoza is a general surgery opted for conservative management, with subsequent resolution of her symptoms.  At the time of this consultation August 10, 2017, she is moving her bowels and eating well without difficulty with plan to discharge home and follow-up with Dr. Vega for potential biopsy.  She then underwent CT-guided biopsy of this lesion at [facility] ordered by Dr. Vega on August 29, 2017.  Pathology showed abdominal seroma with scattered lymphocytes and fibrin and no evidence of malignancy.  CEA from 11/14/17 = 11 (previously 8).   CT of C/A/P on 11/14 shows similar size of LLQ cystic and solid soft tissue lesions.   New/additional prominent mesenteric LN measuring 11 mm.    Improved hydronephrosis on the left.    PET/CT done November 28, 2017 shows previously described mesenteric lymph node with stable size without any appreciable FDG activity but with 2 cm focus of hypermetabolism at nearby segment of small salud with SUV uptake of l 5.4.  Additional 3 cm mass in the left lower quadrant which is hypermetabolic with SUV uptake of 11.6.  Additional 6 cm left lower quadrant cystic mass with no areas of hypermetabolism.  Patient was then referred back to Dr. Mihir Vega after her visit in late November, and then underwent exploratory laparotomy on femora 22nd 2018. She underwent extensive lysis of adhesions with resection of a retroperitoneal mass along with additional small bowel resection, as well as resection and primary anastomosis of left ureter. Pathology revealed metastatic adenocarcinoma involving the proximal and distal ureter, as well as metastatic adenocarcinoma from the primary retroperitoneal mass in addition to additional pieces of fibroadipose tissue also involved by disease.  The patient went home on February 26, 2018 and initially did well. She was readmitted to the hospital on February 28, 2018 secondary to worsening diarrhea and concern over possible obstruction. CT  scans of the abdomen and pelvis were done which demonstrated multiple new hepatic mass lesions concerning for metastases. Postsurgical changes noted with small bowel loops suggestive of postoperative ileus.  The patient was managed conservatively/expectantly and discharged home March 5, 2018 in stable condition.  She was then set up to come back to see me March 8, 2018 in clinic.  Restaging CT chest done 3/20/18 showed No evidence of mets.    CEA =25.  Iron studies c/w iron deficiency.   Patient was then started on FOLFIRI as noted above with Cyramza added with cycle 3.    Restaging CT scans of chest/abdomen/pelvis performed 7/20/18 noted persistent hepatice metastases which appear stable when compared to prior examination, postsurgical changes from prior left hemicolectomy with no evidence of residual or recurrent disease, seruma noted within the anterior soft tissues; corresponding CEA 8.0  Repeat CEA valentin 9/18/18 --4.9  Repeat CEA on 9/25/18--5.0  Repeat CEA on 10/17/18 --4.9  Restaging CT scans C/A/P done 12/3/18:   stable hypodense liver lesions noted, all uchanged, with the largest measuring 1.4 cm in segment 7, with no new or enlarging lesions elsewhere.    CEA = 4.9  Repeat CEA on 12/26 noted at 4.5  Repeat CEA on 1/17/19 noted at 4.6.   CEA on 2/5/19 = 5.1   Repeat CEA on 3/4/19 noted at 6.4   Restaging CT scans done March 22, 2019 with CT of the chest abdomen pelvis showed stable scattered hypodensities in the liver with stability in size and configuration compared to prior exam.  Largest in the posterior right lobe measuring 13 mm without change.  Small stable subcutaneous fluid collection over the anterior pelvis without significant changes.  No new lesions seen.  CEA  4/29/19 = 5.5  CEA on 5/20/19 = 5.2  Repeat CEA from 7/3/2019 = 5.6.   We will plan for restaging scans after cycle 23 to be done later in July.  Repeat CT C/A/P on 7/26/19 showed stable liver lesion to posterior right lobe, persistent  fluid collecition in the subcutaneous tissue overlying the pelvis, unchanged; no evidence of new metastatic disease. CEA noted at 6.9  She completed 23 cycles of treatment July 9, 2019 and then was put on treatment holiday as noted above following restaging scans that showed overall stability  Repeat CEA done 9/6/2019 = 5.3.  Previously 8.0.  Repeat CEA on 10/18/19 noted at 6.5   Patient seen at [facility] ED on 11/1/19 s/t abdominal pain/nausea/vomiting. Admitted for concern for small bowel obstruction. CT A/P obtained showed new/progressive hepatic metastases increased in size/number when compared to previous; enhancing 1.7cm nodule within the distal left ureter; findings concerning with small bowel obstructions/t mild enteritis and possible adhesions.  Seen by surgery for consideration of surgical repair however recommendations made for conservative treatment.   CEA on 11/15/19 noted at 14.6     CT of the thorax on 11/26/2019 showed no evidence of new progressive metastatic disease in the chest, however multiple hepatic metastases are mildly increased in size.  CEA was up to 19.9 on that same day.  Plan on reinitiating FOLFIRI and Cyramza on 12/10/2019.    CEA on 2/17/2020 was 27.2.  Restaging CT scans on 3/4/2020 showed no evidence of metastatic intrathoracic disease.  There was numerous metastatic liver lesions that are slightly larger and more apparent in the interval.  This suggest worsening disease burden.  The scattered mildly prominent abdominopelvic lymph nodes are unchanged in the interval with no new focal lesions or adenopathy identified.    CT-guided liver biopsy done on 3/18/2020 revealed metastatic colorectal adenocarcinoma, moderately differentiated.  Caris from that biopsy specimen showed a KRAS mutation, MSI was stable, tumor mutational burden was intermediate with 7 mutations per megabase, BRAF negative, PTEN positive.  This suggested increase benefit to FOLFOX plus bevacizumab.    CT of the chest,  abdomen, and pelvis on 6/12/2020 showed stable metastatic involvement of the liver with unchanged left hydroureteronephrosis with appearance of hyperdense lesion within the ureteral lumen.  There were no new findings.    Past medical history: Colon cancer with ovarian metastases, benign liver lesions, HTN, stress incontinence, osteoarthritis, GERD, umbilical hernia, small bowel obstruction secondary to hernia.  Procedure history: Incisional hernia repair 6/4/14, knee arthroplasty, colon resection, appendectomy, cholecystectomy, hysterectomy, colonoscopy, ileostomy and removal.   Auditory laparotomy February 22, 2018 as described above  Family history: Motherbreast cancer, fatherskin cancer  Social history: Patient lives at home with spouse. Retired. Regular diet, no exercise. Denies substance/tobacco use, never smoker.         Chief Complaint    Follow Up      Interval History   Patient here for scheduled follow up and for cycle 6 of FOLFOX and bevacizumab tomorrow. She is feeling good overall w/o any new complaints noted on exam today. Denies HA, SOB, CP, N/V/C/D. Denies any new pain/discomfort. Denies fevers, chills or other signs of infection. Appetite and energy are stable. VSS. CBC showed WBC/ANC noted at 3.2/0.86, remainder of labs unremarkable as noted below.          Review of Systems   14 point review systems done full with pertinent positives as described in interval history. Otherwise reviewed and unremarkable.      Health Status   Allergies:    Allergic Reactions (Selected)  Severity Not Documented  Percocet 2.5/325- Nausea.,    Allergies (1) Active Reaction  Percocet 2.5/325 nausea     Current medications:  (Selected)   Outpatient Medications  Ordered  Aloxi (for IVPB): 0.25 mg, IV Piggyback, Once-chemo, Infuse over: 0.3 hr, first dose 01/02/19 9:40:00 CST, stop date 01/02/19 9:40:00 CST, Days 1  Benadryl 50mg/ml Inj: 25 mg, IV Piggyback, Once-chemo, Infuse over: 20 minute(s), first dose 01/02/19  9:40:00 CST, stop date 19 9:40:00 CST, Routine, Days 1  CCA Atropine 0.4 mg/mL PF Injection: 0.25 mg, IV Piggyback, Once-chemo, Infuse over: 20 minute(s), first dose 19 9:40:00 CST, stop date 19 9:40:00 CST, Days 1  Camptosar (for IVPB): 250 mg, IV Piggyback, Once-chemo, Infuse over: 1.5 hr, first dose 19 10:00:00 CST, stop date 19 10:00:00 CST, Days 1  Fusilev (for IVPB): 300 mg, IV Piggyback, Once-chemo, Infuse over: 1.5 hr, first dose 19 10:00:00 CST, stop date 19 10:00:00 CST, Days 1  Sodium Chloride 0.9% 1000mL: form: Infusion, IV Piggyback, Once-chemo, Infuse over: 2 hr, first dose 18 12:50:00 CDT, stop date 18 12:50:00 CDT, Days 1  dexamethasone (for IVPB): 10 mg, IV Piggyback, Once-chemo, Infuse over: 20 minute(s), first dose 19 9:40:00 CST, stop date 19 9:40:00 CST, Days 1  fluorouracil (for IVPB) -CCA: 3,750 mg, IV Piggyback, Once-chemo, Infuse over: 46 hr, first dose 19 14:00:00 CST, stop date 19 14:00:00 CST, Days 1  fluorouracil (for IVPB) -CCA: 625 mg, IV Piggyback, Once-chemo, Infuse over: 30 minute(s), first dose 19 11:30:00 CST, stop date 19 11:30:00 CST, Days 1  fosaprepitant (for IVPB): 150 mg, form: Powder-Inj, IV Piggyback, Once-chemo, Infuse over: 30 minute(s), first dose 19 9:40:00 CST, stop date 19 9:40:00 CST, Days 1  Prescriptions  Prescribed  Lomotil 2.5 mg-0.025 mg oral tablet: 2 tab(s), Oral, q6hr, every 6 hours as needed till diarrhea is controlled, # 30 tab(s), 2 Refill(s), Pharmacy: [facility]/pharmacy #[id number], 152, cm, Height/Length Dosing, 20 8:42:00 CDT, 82.9, kg, Weight Dosing, 20 8:42:00 CDT  Phenergan 25 mg Tab: 25 mg = 1 tab(s), Oral, q6hr, # 60 tab(s), 3 Refill(s), Pharmacy: [place] [address] [id number], Patient Education Provided, Patient Verbalizes Understanding  Zofran ODT 4 mg oral tablet, disintegratin mg = 1 tab(s), Oral, TID, PRN PRN nausea, # 10 tab(s), 0 Refill(s),  [id number] 152, cm, Height/Length Dosing, 20 9:03:00 CDT, 87.7, kg, Weight Dosing, 20 9:03:00 CDT  Zofran ODT 8 mg oral tablet, disintegratin mg tabs, Oral, q6hr, PRN PRN nausea/vomiting, # 15 tab(s), 1 Refill(s), [id number], 152, cm, Height/Length Dosing, 20 9:03:00 CDT, 87.7, kg, Weight Dosing, 20 9:03:00 CDT  dicyclomine 20 mg oral tablet: 20 mg = 1 tab(s), Oral, QID, # 28 tab(s), 0 Refill(s), [id number], 152, cm, Height/Length Dosing, 20 11:33:00 CDT, 87.4, kg, Weight Dosing, 20 11:33:00 CDT  olanzapine 10 mg oral tablet: See Instructions, 1 tab(s) Oral morning of chemo and daily for 2 days following chemo, # 18 tab(s), 0 Refill(s), Pharmacy: [facility]/pharmacy #[id number], 152, cm, Height/Length Dosing, 20 9:03:00 CDT, 87.7, kg, Weight Dosing, 20 9:03:00 CDT  ondansetron 8 mg oral tablet: 8 mg = 1 tab(s), Oral, QID, # 120 tab(s), 0 Refill(s), [id number], 152, cm, Height/Length Dosing, 20 8:42:00 CDT, 82.9, kg, Weight Dosing, 20 8:42:00 CDT  Documented Medications  Documented  Compazine: 0 Refill(s)  LISINOP/HCTZ TAB 10-12.5: 1 tab(s), Oral, Daily  Multivitamins and Minerals oral tablet: 1 tab, Oral, Daily, 0 Refill(s)  azithromycin 250 mg oral tablet: Oral  benzonatate 100 mg oral capsule: 100 mg, 1-2 cap(s), Oral, TID  levothyroxine 25 mcg (0.025 mg) oral tablet: 1 tab, Oral, Daily, on empty stomach   Problem list:    All Problems  Benign neoplasm of liver / SNOMED CT 6456766986 / Confirmed  Colon cancer screening / SNOMED CT 5394392352 / Confirmed  GERD - Gastro-esophageal reflux disease / SNOMED CT 1047296945 / Confirmed  Hypertension / ICD-9-.9 / Confirmed  Cancer of descending colon / SNOMED CT 343303425 / Confirmed  Morbid obesity / SNOMED CT 587345037 / Probable  Ovarian cancer / SNOMED CT 2355089770 / Confirmed  Polyp of colon / SNOMED CT 879418015 / Confirmed  Potential for  infection / SNOMED CT 774133780 / Confirmed  Review of care plan / SNOMED CT 0376265016 / Confirmed  Review of care plan / SNOMED CT 9329277043 / Confirmed  Liver metastases / SNOMED CT 763842892 / Confirmed  Ovarian metastasis / SNOMED CT 823082827 / Confirmed  Valvular heart disease / SNOMED CT 0668695 / Confirmed  Inactive: Alteration in nutrition / SNOMED CT 969974886  Problem automatically updated based on documentation on Nutritional Risk Factors or  Unintentional Weight Loss.  Inactive: At risk for aspiration / SNOMED CT 0617311703  Problem automatically updated based on documentation on Cough, Oxygen Therapy, Aspiration Risk, CN IX, X Swallowing Gag Reflex, GI Symptoms, Nutrition Risk Factors, and/or Enteral Tube Type.  Inactive: At risk for falls / SNOMED CT 634653072  Problem automatically updated based on documentation on History of Falls, History of Falls in last 3 months Florentino, Florentino Fall Risk Score and/or ADLs.  Inactive: At risk for nutritional problem / SNOMED CT 881286658  Problem automatically updated based on documentation on Nutritional Risk Factors or  Unintentional Weight Loss.  Inactive: At risk of healthcare associated infection / SNOMED CT 3965406369  Problem automatically updated based on Central IV Access Type, Arterial Line Site, PA Catheter Type, Pacemaker Type.  Inactive: At risk of pressure sore / SNOMED CT 585515077  Inactive: Bowel dysfunction / SNOMED CT 419881908  Problem added automatically by system based on documentation of Bowel Sounds as Hyperactive, Hyperactive, or Absent;  GI Symptoms as Hyperactive, Constipation, or Diarrhea and/or Passing Flatus as No.  Problem added automatically by system based on documentation of Bowel Sounds as Hyperactive, Hyperactive, or Absent;  GI Symptoms as Hyperactive, Constipation, or Diarrhea and/or Passing Flatus as No.  Inactive: Disorder of fluid balance / SNOMED CT 153334798  Problem automatically updated based on documentation on Edema,  Anasarca, Edema, Generalized, Edema, Bilateral Periorbital, Edema, Face, Edema,, Bilateral Arm, Edema, Left Arm, Edema, Right Arm, Edema, Bilateral Hand, Edema, Left Hand, Edema, Right Hand, Edema, Labia, Edema, Scrotum, Edema, Penile, Edema, Bilateral Upper Leg, Edema, Bilateral Lower Leg, Edema, Bilateral Pretibial, Edema, Bilateral Ankle, Edema, Bilateral Pedal, GI Symptoms, Skin Turgor General, and/or Gestational Age Method.  Resolved: Acid reflux / SNOMED CT 553155956  Resolved: Alteration in patterns of urinary elimination / SNOMED CT 37551307  Problem automatically updated based on documentation on Genitourinary Symptoms and/or Urinary Elimination.  Resolved: At risk of UTI / SNOMED CT 054393245  Problem automatically updated based on documentation on Urinary Elimination.  Resolved: CA - cancer of colon / SNOMED CT [id number]  Resolved: Chemotherapy / SNOMED CT 879655215  Resolved: Heartburn / SNOMED CT [id number]  Resolved: Osteoarthritis / SNOMED CT 5215721194  Resolved: Pregnant / SNOMED CT 363400093  Resolved: Pregnant / SNOMED CT 590581725  Resolved: Pregnant / SNOMED CT 712043849  Resolved: Sensitive to cold / SNOMED CT 5540179578  Resolved: Sensitive to heat / SNOMED CT 9884434053  Resolved: Stress incontinence / ICD-9-.6  Resolved: Umbilical hernia / ICD-9-.1  Canceled: Radiation therapy care / SNOMED CT 6772626192,    Active Problems (14)  Benign neoplasm of liver   Cancer of descending colon   Colon cancer screening   GERD - Gastro-esophageal reflux disease   Hypertension   Liver metastases   Morbid obesity   Ovarian cancer   Ovarian metastasis   Polyp of colon   Potential for infection   Review of care plan   Review of care plan   Valvular heart disease         Physical Examination   Vital Signs   7/6/2020 10:35 CDT       Temperature Oral          37 DegC                             Temperature Oral (calculated)             98.60 DegF                              Peripheral Pulse Rate     74 bpm                             Respiratory Rate          20 br/min                             SpO2                      100 %                             Oxygen Therapy            Room air                             Systolic Blood Pressure   113 mmHg                             Diastolic Blood Pressure  62 mmHg                             Blood Pressure Location   Right arm                             Manual Cuff BP            No                             O2 SAT at rest            100 %     Measurements from flowsheet : Measurements   7/6/2020 10:35 CDT       Weight Dosing             87.5 kg                             Weight Measured           87.5 kg                             Weight Measured and Calculated in Lbs     192.90 lb                             Height/Length Dosing      152 cm                             Height/Length Measured    152 cm                             BSA Measured              1.92 m2                             Body Mass Index Measured  37.87 kg/m2     General:  Alert and oriented.         Ambulation status: Assistive devices ( Cane ).         Appearance: Overweight.         Skin: No clubbing, No edema, Not jaundiced.    Eye:  Pupils are equal, round and reactive to light, Normal conjunctiva, Vision unchanged.    HENT:  Normocephalic, Oral mucosa is moist.    Neck:  Supple, Non-tender, No lymphadenopathy.    Respiratory:  Respirations are non-labored, Breath sounds are equal, Symmetrical chest wall expansion.    Cardiovascular:  Normal rate, Regular rhythm, No edema.    Gastrointestinal:  Soft, Non-tender, Non-distended, Normal bowel sounds, Midline surgical scar   .    Genitourinary:  No costovertebral angle tenderness.    Musculoskeletal:       Mobility/ gait: Able to walk with a cane,    .    Integumentary:  Warm, Dry, Intact.    Neurologic:  Alert, Oriented, No focal deficits.    Cognition and Speech:  Speech clear and  coherent.    Psychiatric:  Cooperative, Appropriate mood & affect.    ECOG Performance Scale: 1- Strenuous physical activity restricted; fully ambulatory and able to carry out light work.      Review / Management   Results review:  Last 10 Days Lab Results : [facility]   7/6/2020 10:11 CDT       WBC                       3.2 x10(3)/mcL  LOW                             RBC                       3.12 x10(6)/mcL  LOW                             Hgb                       10.5 gm/dL  LOW                             Hct                       33.6 %  LOW                             Platelet                  302 x10(3)/mcL                             MCV                       107.7 fL  HI                             MCH                       33.7 pg  HI                             MCHC                      31.3 gm/dL  LOW                             RDW                       17.8 %  HI                             MPV                       8.5 fL  LOW                             Abs Neut                  0.86 x10(3)/mcL  LOW                             NEUT%                     27.2 %  NA                             NEUT#                     0.9 x10(3)/mcL  LOW                             LYMPH%                    41.3 %  NA                             LYMPH#                    1.3 x10(3)/mcL                             MONO%                     26.8 %  NA                             MONO#                     0.8 x10(3)/mcL                             EOS%                      3.8 %  NA                             EOS#                      0.1 x10(3)/mcL                             BASO%                     0.6 %  NA                             BASO#                     0.0 x10(3)/mcL                             [facility]            Negative  (Modified) .       Impression and Plan   Diagnosis     1. 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 Patient had recurrence/mets to ovaries in 2011. See note below for more details.  KRAS mutation +  2. Recurrent metastatic dz with  dz noted in the retroperitoneum with involvement of the proximal and distal ureter status post exploratory laparotomy with resection and lysis of adhesions and primary anastomosis of left ureter performed February 22nd 2018 by Dr. Gary Pugh, along with liver metastases and mesenteric adenopathy.  3. Left hydroureteronephrosis s/p JJ stent placement during hospitalization in August 2017.   4. Treatment induced diarrhea-imrpoved since stopping chemo.   5. Repeat CT A/P on 11/1/19 with noted increase in liver lesion; restaging chest CT on 11/26/2019 showed worsening disease in her liver, no new disease in her chest..     Plan:    -She started FOLFOX and bevacizumab, cycle 1 day 1 on 3/24/2020.    -Her CT scans after 4 cycles shows stable disease, we will continue with 10% dose reduction across the board and will continue with Emend support.  We will also continue with every 3-week treatment.    -Will HOLD cycle #6 of treatment tomorrow s/t neutropenia. Will repeat labs next week and tentative reschedule Cycle #6 for 7/14/2020. In the future, if counts continue to struggle to recover we may have to add growth factor support but will hold off on this for now- discussed with patient today who voiced understanding    -We will continue her current antiemetics and antidiarrheal medications.    -She will return to clinic in 4 weeks for TD visit prior to cycle #7     -CBC, BMP, liver panel, CEA same day lab     -Call with questions/concerns should they arise      CINTHYA Curtis.

## 2022-04-30 NOTE — PROGRESS NOTES
Patient:   Terri Penny            MRN: [id number]            FIN: [id number]               Age:   64 years     Sex:  Female     :  1955   Associated Diagnoses:   None   Author:   Clay ORTIZ MD, Desmond GAMBLE      Visit Information   PROBLEM LIST  1. 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 Patient had recurrence/mets to ovaries in . See note below for more details.  KRAS mutation +  2. Recurrent metastatic dz with  dz noted in the retroperitoneum with involvement of the proximal and distal ureter status post exploratory laparotomy with resection and lysis of adhesions and primary anastomosis of left ureter performed 2018 by Dr. Vega and Dr. Pugh.  3. Liver metastases along with mesenteric LAD.   4. Left hydroureteronephrosis s/p JJ stent placement during hospitalization in 2017.   5. Treatment induced diarrhea.  Much improved since stopping chemo.         CURRENT THERAPY:  1.  Started FOLFOX and bevacizumab on 3/24/2020.    TREATMENT HISTORY:  1.  Adjuvant XELIRI + Bevacizumab initiated 11, completed 10/2011  2.  FLOX chemotherapy 3/17/10, completed 8/11/10  3.  FOLFIRI plus Cyramza started 4-18 given every 3 weeks for tolerance.  Completed 23 cycles as of 2019.  Treatment was then discontinued based on worsening side effects and patient was given a treatment break.  4.  FOLFIRI plus Cyramza reinitiated cycle 24 on 12/10/2019 due to progressive disease in her liver -- Due for Cycle #28 on 3/10/2020 however discontinued due to disease progression.      HISTORY:  Terri Penny presented to the emergency room in [facility] 2/12/10, with an obstructed colon. A colonoscopy by Dr. Brambila revealed an obstructing friable lesion at  50cm in the region of the splenic flexure and descending colon. The scope was not able to be passed beyond the obstructing lesion. Dr. Zaragoza performed a colectomy with ileostomy placement as well as a cholecystectomy and appendectomy. Pathology revealed infiltrating moderately differentiated mucinous adenocarcinoma with tumor extension through all layers of the bowel wall and into the pericolonic fat, free surgical margins, and 0 of 46 lymph nodes. Also, acute appendicitis with perforation and a periappendiceal abscess as well as acute cholecystitis were found. The patient initiated adjuvant FLOX chemotherapy 3/17/10 and completed treatment on 8/11/10. Her ileostomy was reversed 9/22/10. At her 2/10/11 follow-up visit the CEA had increased from 1/8 to 3.5. The CEA was repeated one month later found to have increased further to 4.5. She was referred for CT scans which revealed a 10.6 x 6.5 cm left adnexal mass. Mammogram 10/25/10 was benign. MRI of the abdomen 11/8/10 indicated an indeterminate lesion post segment of the right hepatic lobe thought to be a cyst was stable compared to prior scan. CXR was clear.    Because of the possibility of a new ovarian primary versus metastatic colon carcinoma revealed in the CT, she underwent resection by Dr. Veag 4/18/11 with no findings of peritoneal metastasis; pathology was positive for colon carcinoma metastatic to the ovary. She was initiated on adjuvant Bevacizumab, Irinotecan, and Xeloda 5/18/11. She did not experience diarrhea but did have several days of nausea that was well controlled with Zofran. No palmar/plantar complaints. Patient completed 8 courses of treatment on 10/14/11. Tumor was found positive for KRAS mutation and unresponsive to EGFR inhibitor agents.    CT abdomen/pelvis 8/29/11 revealed no new metastatic lesion. There were 2 lesions in the liver which were present in the 2/2010 films that were very slightly enlarged.    PET/CT 11/1/11 showed no  metastasis. She did have vague uptake in the terminal ileum related to the ileitis for which she was admitted to [facility] 10/29/11 and resolved.    PET/CT 2/9/2012 showed no definite residual malignancy or metastatic disease; new mildly increased patchy FDG uptake in the L-hepatic lobe indeterminate with no corresponding CT lesion. US was diagnostic of a hemangioma.    She underwent colonoscopy 6/22/2012 which revealed 2 small adenomatous polyps with no dysplasia. Dr. Vences recommended colonoscopy in one year.    PET/CT 2/2012: no evidence of disease.  PET/CT 8/27/12: no evidence of metastatic disease with incidental cystic structure in the left hemipelvis noted on prior exams.  PET/CT 12/5/12: low density cystic mass at left anterior adnexa, probable lymphocele no increased FDG uptake. No evidence of metastatic disease.  PET/CT 3/13: no evidence of disease. CEA=2.3  CT scans 9/26/2013 with no acute findings suspicious for recurrent disease. Stable simple-appearing cystic lesion in the left hemipelvis has not changed in size measuring 7 x 4.8 cm possibly representing postoperative seroma. CEA=2.4  Repeat PET/CT 1/10/14 (following rise in CEA to 4.0) showed LENNY.   CT scan of the abdomen 04/03/2014 secondary to abdominal pain showed partial small bowel obstruction  CT 7/16/14 of C/A/P:  no evidence of new metastases.  Post operative large seroma noted measuring 19 cm, along with left pelvic fluid collection.   CT scans done January 13, 2015 show approximately 1.4 cm hypodense lesion in the posterior portion of hepatic segment 7 which is more conspicuous on the current study but unchanged in size from the prior exam.  However, no comment was made of this lesion from the radiologist's report from the prior scans in July 2014.    CEA = 4.0 with prior CEA of 3.0  Restaging CT scans done 7/14/15--> LENNY. Stable benign right posterior liver lesion measuring 1.4 cm.  CEA WNL  repeat CEA from October 2015 = 4.  Repeat CT C/A/P  done 02/03/16 LENNY--->02/03/16 CEA =4  Colonoscopy done July 19, 2016 was unremarkable.  CT scans of the chest abdomen pelvis done August 5, 2016 show slightly larger fluid collection anterior abdomen with stable hypodensity posterior aspect of the right hepatic lobe.  No new lesions.   CEA from August 5, 2016 = 6  Repeat CEA from November 1, 2016 = 7, increased slightly from August.  Repeat CT scans on February 2, 2017 shows stable posterior right hepatic lobe density measuring 1.2 cm, with stable subcutaneous fluid collection, and stable left adnexal cystic lesion.  CEA = 8  Repeat CEA from May 5, 2017 stable = 8  Patient was admitted August 6, 2017 after presenting with abdominal pain that began the day before with subsequent  development of vomiting episodes.    CT scan done August 6, 2017 at the time of presentation revealed dilated loops of small bowel likely consistent with ileus or partial obstruction.  Redemonstration of moderate left hydroureteronephrosis with obstruction at the level of the pelvic inlet adjacent to the previously described soft tissue density and cystic lesion.  The patient had undergone restaging CT scans 2 days prior in August 4, 2017 as part of her routine follow-up visit with me which revealed 4 mm nodule along the right minor fissure likely benign etiology--with stable findings compared to prior scans.  Within the abdomen and pelvis, moderate left hydronephrosis with left ureter obstructed at the level of the pelvic inlet adjacent to a 5.1 x 6.4 cm cyst along with a 3.0 x 2.1 cm soft tissue density respectively.  Radiologist comments that the soft tissue density had measured 2.8 x 2.1 cm on prior scan from February 27, 2016.  However, when looking back at the radiology report from February 2016, no mention was made of such a lesion.  Urology and surgery were consulted.  Urology placed a JJ stent on the left and Dr. Zaragoza is a general surgery opted for conservative management, with  subsequent resolution of her symptoms.  At the time of this consultation August 10, 2017, she is moving her bowels and eating well without difficulty with plan to discharge home and follow-up with Dr. Vega for potential biopsy.  She then underwent CT-guided biopsy of this lesion at [facility] ordered by Dr. Vega on August 29, 2017.  Pathology showed abdominal seroma with scattered lymphocytes and fibrin and no evidence of malignancy.  CEA from 11/14/17 = 11 (previously 8).   CT of C/A/P on 11/14 shows similar size of LLQ cystic and solid soft tissue lesions.   New/additional prominent mesenteric LN measuring 11 mm.    Improved hydronephrosis on the left.    PET/CT done November 28, 2017 shows previously described mesenteric lymph node with stable size without any appreciable FDG activity but with 2 cm focus of hypermetabolism at nearby segment of small salud with SUV uptake of l 5.4.  Additional 3 cm mass in the left lower quadrant which is hypermetabolic with SUV uptake of 11.6.  Additional 6 cm left lower quadrant cystic mass with no areas of hypermetabolism.  Patient was then referred back to Dr. Mihir Vega after her visit in late November, and then underwent exploratory laparotomy on femora 22nd 2018. She underwent extensive lysis of adhesions with resection of a retroperitoneal mass along with additional small bowel resection, as well as resection and primary anastomosis of left ureter. Pathology revealed metastatic adenocarcinoma involving the proximal and distal ureter, as well as metastatic adenocarcinoma from the primary retroperitoneal mass in addition to additional pieces of fibroadipose tissue also involved by disease.  The patient went home on February 26, 2018 and initially did well. She was readmitted to the hospital on February 28, 2018 secondary to worsening diarrhea and concern over possible obstruction. CT scans of the abdomen and pelvis were done which demonstrated multiple new  hepatic mass lesions concerning for metastases. Postsurgical changes noted with small bowel loops suggestive of postoperative ileus.  The patient was managed conservatively/expectantly and discharged home March 5, 2018 in stable condition.  She was then set up to come back to see me March 8, 2018 in clinic.  Restaging CT chest done 3/20/18 showed No evidence of mets.    CEA =25.  Iron studies c/w iron deficiency.   Patient was then started on FOLFIRI as noted above with Cyramza added with cycle 3.    Restaging CT scans of chest/abdomen/pelvis performed 7/20/18 noted persistent hepatice metastases which appear stable when compared to prior examination, postsurgical changes from prior left hemicolectomy with no evidence of residual or recurrent disease, seruma noted within the anterior soft tissues; corresponding CEA 8.0  Repeat CEA valentin 9/18/18 --4.9  Repeat CEA on 9/25/18--5.0  Repeat CEA on 10/17/18 --4.9  Restaging CT scans C/A/P done 12/3/18:   stable hypodense liver lesions noted, all uchanged, with the largest measuring 1.4 cm in segment 7, with no new or enlarging lesions elsewhere.    CEA = 4.9  Repeat CEA on 12/26 noted at 4.5  Repeat CEA on 1/17/19 noted at 4.6.   CEA on 2/5/19 = 5.1   Repeat CEA on 3/4/19 noted at 6.4   Restaging CT scans done March 22, 2019 with CT of the chest abdomen pelvis showed stable scattered hypodensities in the liver with stability in size and configuration compared to prior exam.  Largest in the posterior right lobe measuring 13 mm without change.  Small stable subcutaneous fluid collection over the anterior pelvis without significant changes.  No new lesions seen.  CEA  4/29/19 = 5.5  CEA on 5/20/19 = 5.2  Repeat CEA from 7/3/2019 = 5.6.   We will plan for restaging scans after cycle 23 to be done later in July.  Repeat CT C/A/P on 7/26/19 showed stable liver lesion to posterior right lobe, persistent fluid collecition in the subcutaneous tissue overlying the pelvis, unchanged;  no evidence of new metastatic disease. CEA noted at 6.9  She completed 23 cycles of treatment July 9, 2019 and then was put on treatment holiday as noted above following restaging scans that showed overall stability  Repeat CEA done 9/6/2019 = 5.3.  Previously 8.0.  Repeat CEA on 10/18/19 noted at 6.5   Patient seen at [facility] ED on 11/1/19 s/t abdominal pain/nausea/vomiting. Admitted for concern for small bowel obstruction. CT A/P obtained showed new/progressive hepatic metastases increased in size/number when compared to previous; enhancing 1.7cm nodule within the distal left ureter; findings concerning with small bowel obstructions/t mild enteritis and possible adhesions.  Seen by surgery for consideration of surgical repair however recommendations made for conservative treatment.   CEA on 11/15/19 noted at 14.6     CT of the thorax on 11/26/2019 showed no evidence of new progressive metastatic disease in the chest, however multiple hepatic metastases are mildly increased in size.  CEA was up to 19.9 on that same day.  Plan on reinitiating FOLFIRI and Cyramza on 12/10/2019.    CEA on 2/17/2020 was 27.2.  Restaging CT scans on 3/4/2020 showed no evidence of metastatic intrathoracic disease.  There was numerous metastatic liver lesions that are slightly larger and more apparent in the interval.  This suggest worsening disease burden.  The scattered mildly prominent abdominopelvic lymph nodes are unchanged in the interval with no new focal lesions or adenopathy identified.    CT-guided liver biopsy done on 3/18/2020 revealed metastatic colorectal adenocarcinoma, moderately differentiated.  Caris from that biopsy specimen showed a KRAS mutation, MSI was stable, tumor mutational burden was intermediate with 7 mutations per megabase, BRAF negative, PTEN positive.  This suggested increase benefit to FOLFOX plus bevacizumab.    Past medical history: Colon cancer with ovarian metastases, benign liver lesions, HTN, stress  incontinence, osteoarthritis, GERD, umbilical hernia, small bowel obstruction secondary to hernia.  Procedure history: Incisional hernia repair 6/4/14, knee arthroplasty, colon resection, appendectomy, cholecystectomy, hysterectomy, colonoscopy, ileostomy and removal.   Auditory laparotomy February 22, 2018 as described above  Family history: Motherbreast cancer, fatherskin cancer  Social history: Patient lives at home with spouse. Retired. Regular diet, no exercise. Denies substance/tobacco use, never smoker.         Chief Complaint    Follow Up      Interval History   Patient here for scheduled follow up and for cycle 4 of FOLFOX and bevacizumab.  She states that she likes the every 3-week treatment regimen compared with every 2 weeks.  This allows her to feel much better for the good portion of the last week prior to getting her treatment.  She denies any chest pain, shortness of breath, nausea, vomiting, constipation.  Overall, she is doing well with no major complaints or issues and is much happier with the every 3-week regimen as mentioned above.      Review of Systems   14 point review systems done full with pertinent positives as described in interval history. Otherwise reviewed and unremarkable.      Health Status   Allergies:    Allergic Reactions (Selected)  Severity Not Documented  Percocet 2.5/325- Nausea.,    Allergies (1) Active Reaction  Percocet 2.5/325 nausea     Current medications:  (Selected)   Outpatient Medications  Ordered  Aloxi (for IVPB): 0.25 mg, IV Piggyback, Once-chemo, Infuse over: 0.3 hr, first dose 01/02/19 9:40:00 CST, stop date 01/02/19 9:40:00 CST, Days 1  Benadryl 50mg/ml Inj: 25 mg, IV Piggyback, Once-chemo, Infuse over: 20 minute(s), first dose 01/02/19 9:40:00 CST, stop date 01/02/19 9:40:00 CST, Routine, Days 1  CCA Atropine 0.4 mg/mL PF Injection: 0.25 mg, IV Piggyback, Once-chemo, Infuse over: 20 minute(s), first dose 01/02/19 9:40:00 CST, stop date 01/02/19 9:40:00 CST,  Days 1  Camptosar (for IVPB): 250 mg, IV Piggyback, Once-chemo, Infuse over: 1.5 hr, first dose 01/02/19 10:00:00 CST, stop date 01/02/19 10:00:00 CST, Days 1  Fusilev (for IVPB): 300 mg, IV Piggyback, Once-chemo, Infuse over: 1.5 hr, first dose 01/02/19 10:00:00 CST, stop date 01/02/19 10:00:00 CST, Days 1  Sodium Chloride 0.9% 1000mL: form: Infusion, IV Piggyback, Once-chemo, Infuse over: 2 hr, first dose 05/11/18 12:50:00 CDT, stop date 05/11/18 12:50:00 CDT, Days 1  dexamethasone (for IVPB): 10 mg, IV Piggyback, Once-chemo, Infuse over: 20 minute(s), first dose 01/02/19 9:40:00 CST, stop date 01/02/19 9:40:00 CST, Days 1  fluorouracil (for IVPB) -CCA: 3,750 mg, IV Piggyback, Once-chemo, Infuse over: 46 hr, first dose 01/02/19 14:00:00 CST, stop date 01/02/19 14:00:00 CST, Days 1  fluorouracil (for IVPB) -CCA: 625 mg, IV Piggyback, Once-chemo, Infuse over: 30 minute(s), first dose 01/02/19 11:30:00 CST, stop date 01/02/19 11:30:00 CST, Days 1  fosaprepitant (for IVPB): 150 mg, form: Powder-Inj, IV Piggyback, Once-chemo, Infuse over: 30 minute(s), first dose 01/02/19 9:40:00 CST, stop date 01/02/19 9:40:00 CST, Days 1  Future  Aloxi (for IVPB): 0.25 mg, IV Piggyback, Once-chemo, Infuse over: 20 minute(s), *Est. first dose 05/26/20 9:21:00 CDT, *Est. stop date 05/26/20 9:21:00 CDT, Future Order, Days 1  Benadryl (for IVPB): 25 mg, IV Piggyback, Once-chemo, Infuse over: 20 minute(s), *Est. first dose 05/26/20 9:21:00 CDT, *Est. stop date 05/26/20 9:21:00 CDT, Future Order, Days 1  Heparin Flush 100 U/mL - 5 mL: 500 units, form: Injection, IV Push, Once-chemo, *Est. first dose 05/28/20 9:21:00 CDT, *Est. stop date 05/28/20 9:21:00 CDT, Routine, Days 3, Future Order  Leucovorin (for IVPB): 662.4 mg, IV Piggyback, Once-chemo, Infuse over: 2 hr, *Est. first dose 05/26/20 9:41:00 CDT, *Est. stop date 05/26/20 9:41:00 CDT, Future Order, Days 1  MVASI  (for IVPB): 394.65 mg, form: Soln, IV Piggyback, Once-chemo, Infuse  over: 30 minute(s), *Est. first dose 20 9:21:00 CDT, *Est. stop date 20 9:21:00 CDT, Future Order, Days 1  acetaminophen: 650 mg, form: Tab, Oral, Once-chemo, *Est. first dose 20 9:21:00 CDT, *Est. stop date 20 9:21:00 CDT, Routine, Pre-Med, Days 1, Future Order  dexamethasone (for IVPB): 10 mg, IV Piggyback, Once-chemo, Infuse over: 20 minute(s), *Est. first dose 20 9:21:00 CDT, *Est. stop date 20 9:21:00 CDT, Future Order, Days 1  fluorouracil (for IVPB) -CCA: 3,974.4 mg, IV Piggyback, Once-chemo, Infuse over: 46 hr, *Est. first dose 20 12:41:00 CDT, *Est. stop date 20 12:41:00 CDT, Future Order, Days 1  fluorouracil (for IVPB) -CCA: 662.4 mg, IV Piggyback, Once-chemo, Infuse over: 30 minute(s), *Est. first dose 20 11:41:00 CDT, *Est. stop date 20 11:41:00 CDT, Future Order, Days 1  fosaprepitant (for IVPB): 150 mg, form: Powder-Inj, IV Piggyback, Once-chemo, Infuse over: 30 minute(s), *Est. first dose 20 9:21:00 CDT, *Est. stop date 20 9:21:00 CDT, Future Order, Days 1  oxaliplatin (for IVPB): 140.76 mg, IV Piggyback, Once-chemo, Infuse over: 2 hr, *Est. first dose 20 9:41:00 CDT, *Est. stop date 20 9:41:00 CDT, Future Order, Days 1  Prescriptions  Prescribed  Lomotil 2.5 mg-0.025 mg oral tablet: 2 tab(s), Oral, q6hr, every 6 hours as needed till diarrhea is controlled, # 30 tab(s), 2 Refill(s), Pharmacy: [facility]/pharmacy #[id number], 152, cm, Height/Length Dosing, 20 8:42:00 CDT, 82.9, kg, Weight Dosing, 20 8:42:00 CDT  Phenergan 25 mg Tab: 25 mg = 1 tab(s), Oral, q6hr, # 60 tab(s), 3 Refill(s), Pharmacy: [place] [id number] [id number], Patient Education Provided, Patient Verbalizes Understanding  Zofran ODT 4 mg oral tablet, disintegratin mg = 1 tab(s), Oral, TID, PRN PRN nausea, # 10 tab(s), 0 Refill(s), Pharmacy: [facility]/pharmacy #[id number], 152, cm, Height/Length Dosing, 20 9:03:00 CDT, 87.7, kg, Weight Dosing, 20 9:03:00  CDT  Zofran ODT 8 mg oral tablet, disintegratin mg tabs, Oral, q6hr, PRN PRN nausea/vomiting, # 15 tab(s), 1 Refill(s), Pharmacy: [id number], 152, cm, Height/Length Dosing, 20 9:03:00 CDT, 87.7, kg, Weight Dosing, 20 9:03:00 CDT  dicyclomine 20 mg oral tablet: 20 mg = 1 tab(s), Oral, QID, # 28 tab(s), 0 Refill(s), Pharmacy: [id number], 152, cm, Height/Length Dosing, 20 11:33:00 CDT, 87.4, kg, Weight Dosing, 20 11:33:00 CDT  olanzapine 10 mg oral tablet: See Instructions, 1 tab(s) Oral morning of chemo and daily for 2 days following chemo, # 18 tab(s), 0 Refill(s), Pharmacy: [id number], 152, cm, Height/Length Dosing, 20 9:03:00 CDT, 87.7, kg, Weight Dosing, 20 9:03:00 CDT  ondansetron 8 mg oral tablet: 8 mg = 1 tab(s), Oral, QID, # 120 tab(s), 0 Refill(s), Pharmacy: [id number], 152, cm, Height/Length Dosing, 20 8:42:00 CDT, 82.9, kg, Weight Dosing, 20 8:42:00 CDT  Documented Medications  Documented  Compazine: 0 Refill(s)  LISINOP/HCTZ TAB 10-12.5: 1 tab(s), Oral, Daily  Multivitamins and Minerals oral tablet: 1 tab, Oral, Daily, 0 Refill(s)  levothyroxine 25 mcg (0.025 mg) oral tablet: 1 tab, Oral, Daily, on empty stomach   Problem list:    All Problems  Benign neoplasm of liver / SNOMED CT 7955366964 / Confirmed  Colon cancer screening / SNOMED CT 1679836191 / Confirmed  GERD - Gastro-esophageal reflux disease / SNOMED CT 0371230206 / Confirmed  Hypertension / ICD-9-.9 / Confirmed  Cancer of descending colon / SNOMED CT 766502383 / Confirmed  Morbid obesity / SNOMED CT 943890543 / Probable  Ovarian cancer / SNOMED CT 3669026012 / Confirmed  Polyp of colon / SNOMED CT 915097976 / Confirmed  Potential for infection / SNOMED CT 831066048 / Confirmed  Review of care plan / SNOMED CT 8276601167 / Confirmed  Review of care plan / SNOMED CT 8885207362 / Confirmed  Liver metastases / SNOMED CT 296344511 / Confirmed  Ovarian metastasis  / SNOMED CT 289015796 / Confirmed  Valvular heart disease / SNOMED CT 1516154 / Confirmed  Inactive: Alteration in nutrition / SNOMED CT 981951634  Problem automatically updated based on documentation on Nutritional Risk Factors or  Unintentional Weight Loss.  Inactive: At risk for aspiration / SNOMED CT 0296317795  Problem automatically updated based on documentation on Cough, Oxygen Therapy, Aspiration Risk, CN IX, X Swallowing Gag Reflex, GI Symptoms, Nutrition Risk Factors, and/or Enteral Tube Type.  Inactive: At risk for falls / SNOMED CT 158218410  Problem automatically updated based on documentation on History of Falls, History of Falls in last 3 months Florentino, Florentino Fall Risk Score and/or ADLs.  Inactive: At risk for nutritional problem / SNOMED CT 214776546  Problem automatically updated based on documentation on Nutritional Risk Factors or  Unintentional Weight Loss.  Inactive: At risk of healthcare associated infection / SNOMED CT 5036467498  Problem automatically updated based on Central IV Access Type, Arterial Line Site, PA Catheter Type, Pacemaker Type.  Inactive: At risk of pressure sore / SNOMED CT 808802553  Inactive: Bowel dysfunction / SNOMED CT 151701861  Problem added automatically by system based on documentation of Bowel Sounds as Hyperactive, Hyperactive, or Absent;  GI Symptoms as Hyperactive, Constipation, or Diarrhea and/or Passing Flatus as No.  Problem added automatically by system based on documentation of Bowel Sounds as Hyperactive, Hyperactive, or Absent;  GI Symptoms as Hyperactive, Constipation, or Diarrhea and/or Passing Flatus as No.  Inactive: Disorder of fluid balance / SNOMED CT 722093026  Problem automatically updated based on documentation on Edema, Anasarca, Edema, Generalized, Edema, Bilateral Periorbital, Edema, Face, Edema,, Bilateral Arm, Edema, Left Arm, Edema, Right Arm, Edema, Bilateral Hand, Edema, Left Hand, Edema, Right Hand, Edema, Labia, Edema, Scrotum, Edema,  Penile, Edema, Bilateral Upper Leg, Edema, Bilateral Lower Leg, Edema, Bilateral Pretibial, Edema, Bilateral Ankle, Edema, Bilateral Pedal, GI Symptoms, Skin Turgor General, and/or Gestational Age Method.  Resolved: Acid reflux / SNOMED CT 506011309  Resolved: Alteration in patterns of urinary elimination / SNOMED CT 99162764  Problem automatically updated based on documentation on Genitourinary Symptoms and/or Urinary Elimination.  Resolved: At risk of UTI / SNOMED CT 885362356  Problem automatically updated based on documentation on Urinary Elimination.  Resolved: CA - cancer of colon / SNOMED CT [id number]  Resolved: Chemotherapy / SNOMED CT 407631503  Resolved: Heartburn / SNOMED CT [id number]  Resolved: Osteoarthritis / SNOMED CT 3762522019  Resolved: Pregnant / SNOMED CT 831966458  Resolved: Pregnant / SNOMED CT 844482401  Resolved: Pregnant / SNOMED CT 979967304  Resolved: Sensitive to cold / SNOMED CT 1266590059  Resolved: Sensitive to heat / SNOMED CT 8077154985  Resolved: Stress incontinence / ICD-9-.6  Resolved: Umbilical hernia / ICD-9-.1  Canceled: Radiation therapy care / SNOMED CT 5638253450,    Active Problems (14)  Benign neoplasm of liver   Cancer of descending colon   Colon cancer screening   GERD - Gastro-esophageal reflux disease   Hypertension   Liver metastases   Morbid obesity   Ovarian cancer   Ovarian metastasis   Polyp of colon   Potential for infection   Review of care plan   Review of care plan   Valvular heart disease         Physical Examination   Vital Signs   5/26/2020 9:03 CDT       Temperature Oral          36.3 DegC                             Temperature Oral (calculated)             97.34 DegF                             Peripheral Pulse Rate     82 bpm                             Respiratory Rate          20 br/min                             SpO2                      99 %                             Oxygen Therapy             Room air                             Systolic Blood Pressure   131 mmHg                             Diastolic Blood Pressure  67 mmHg                             Blood Pressure Location   Right arm                             Manual Cuff BP            No                             O2 SAT at rest            99 %     Measurements from flowsheet : Measurements   5/26/2020 9:03 CDT       Weight Dosing             87.7 kg                             Weight Measured           87.7 kg                             Weight Measured and Calculated in Lbs     193.34 lb                             Height/Length Dosing      152 cm                             Height/Length Measured    152 cm                             BSA Measured              1.92 m2                             Body Mass Index Measured  37.96 kg/m2     General:  Alert and oriented.         Ambulation status: Assistive devices ( Cane ).         Appearance: Overweight.         Skin: No clubbing, No edema, Not jaundiced.    Eye:  Pupils are equal, round and reactive to light, Normal conjunctiva, Vision unchanged.    HENT:  Normocephalic, Oral mucosa is moist.    Neck:  Supple, Non-tender, No lymphadenopathy.    Respiratory:  Respirations are non-labored, Breath sounds are equal, Symmetrical chest wall expansion.    Cardiovascular:  Normal rate, Regular rhythm, No edema.    Gastrointestinal:  Soft, Non-tender, Non-distended, Normal bowel sounds, Midline surgical scar   .    Genitourinary:  No costovertebral angle tenderness.    Musculoskeletal:       Mobility/ gait: Able to walk with a cane,    .    Integumentary:  Warm, Dry, Intact.    Neurologic:  Alert, Oriented, No focal deficits.    Cognition and Speech:  Speech clear and coherent.    Psychiatric:  Cooperative, Appropriate mood & affect.    ECOG Performance Scale: 1- Strenuous physical activity restricted; fully ambulatory and able to carry out light work.      Review / Management   Results review:   Lab results   5/26/2020 9:09 CDT       [facility]            Negative    5/26/2020 8:40 CDT       WBC                       4.4 x10(3)/mcL  LOW                             RBC                       3.31 x10(6)/mcL  LOW                             Hgb                       11.0 gm/dL  LOW                             Hct                       34.6 %  LOW                             Platelet                  221 x10(3)/mcL                             MCV                       104.5 fL  HI                             MCH                       33.2 pg  HI                             MCHC                      31.8 gm/dL  LOW                             RDW                       16.8 %                             MPV                       8.6 fL  LOW                             Abs Neut                  1.80 x10(3)/mcL  LOW                             NEUT%                     40.4 %  NA                             NEUT#                     1.8 x10(3)/mcL  LOW                             LYMPH%                    37.3 %  NA                             LYMPH#                    1.7 x10(3)/mcL                             MONO%                     18.9 %  NA                             MONO#                     0.8 x10(3)/mcL                             EOS%                      1.8 %  NA                             EOS#                      0.1 x10(3)/mcL                             BASO%                     0.7 %  NA                             BASO#                     0.0 x10(3)/mcL  .       Impression and Plan   Diagnosis     1. 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 Patient had recurrence/mets to ovaries in 2011. See note below for more details.  KRAS mutation +  2. Recurrent metastatic dz with  dz noted in  the retroperitoneum with involvement of the proximal and distal ureter status post exploratory laparotomy with resection and lysis of adhesions and primary anastomosis of left ureter performed February 22nd 2018 by Dr. Gary Pugh, along with liver metastases and mesenteric adenopathy.  3. Left hydroureteronephrosis s/p JJ stent placement during hospitalization in August 2017.   4. Treatment induced diarrhea-imrpoved since stopping chemo.   5. Repeat CT A/P on 11/1/19 with noted increase in liver lesion; restaging chest CT on 11/26/2019 showed worsening disease in her liver, no new disease in her chest..     Plan:    -She started FOLFOX and bevacizumab, cycle 1 day 1 on 3/24/2020.    -We will continue with 10% dose reduction across the board and will continue with Emend support.    -We will continue her current antiemetics and antidiarrheal medications.    -She will return to clinic in 3 weeks to review the results of her CT scans and prior to cycle 5 of treatment.    -I have refilled her Zyprexa and Zofran.      All questions were answered to the best of my ability and the patient understands the plan moving forward.    Desmond Williamson II, MD.

## 2022-04-30 NOTE — PROGRESS NOTES
Patient:   Terri Penny            MRN: [id number]            FIN: [id number]               Age:   62 years     Sex:  Female     :  1955   Associated Diagnoses:   None   Author:   Joyce RAMON, Cora CHO      Visit Information   PROBLEM LIST  1. 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 Patient had recurrence/mets to ovaries in . See note below for more details.  KRAS mutation +  2. Partial small bowel obstruction, apparently secondary to an incarcerated loop through an anterior abdominal wall incisional hernia--treated conservatively after admission on y Dr. Zaragoza.  3. Hernia repar with lysis of adhesions 14  4. Benign liver lesion  currently under surveillance.  Stable on f/u CT scans.   5. Enlarging soft tissue densities-- cystic and solid in the LLQ s/p CT guided bx on 17, which was negative.    6. Stable left adnexal cystic lesion  7. Left hydroureteronephrosis s/p JJ stent placement during hospitalization in 2017.   8. New mesenteric LN on f/u CT on 17--> PET + adjacent activity along with additional +FDG mass in LLQ  9. Recurrent metastatic colorectal cancer with disease noted in the retroperitoneum with involvement of the proximal and distal ureter status post exploratory laparotomy with resection and lysis of adhesions and primary anastomosis of left ureter performed 2018 by Dr. Gary Pugh.  10.  Liver mets  11. Iron deficiency anemia    CURRENT THERAPY:  FOLFIRI  start date 18, given q 2 weeks plus ramucirumab at 8mg/kg ( added with Cycle 3), with 10% dose reduction chemotherapy  Completed cycle #3--- 18       TREATMENT HISTORY:  Adjuvant XELIRI + Bevacizumab initiated 11, completed 10/2011  FLOX  chemotherapy 3/17/10, completed 8/11/10    HISTORY:  Terri Penny presented to the emergency room in [facility] 2/12/10, with an obstructed colon. A colonoscopy by Dr. Brambila revealed an obstructing friable lesion at 50cm in the region of the splenic flexure and descending colon. The scope was not able to be passed beyond the obstructing lesion. Dr. Zaragoza performed a colectomy with ileostomy placement as well as a cholecystectomy and appendectomy. Pathology revealed infiltrating moderately differentiated mucinous adenocarcinoma with tumor extension through all layers of the bowel wall and into the pericolonic fat, free surgical margins, and 0 of 46 lymph nodes. Also, acute appendicitis with perforation and a periappendiceal abscess as well as acute cholecystitis were found. The patient initiated adjuvant FLOX chemotherapy 3/17/10 and completed treatment on 8/11/10. Her ileostomy was reversed 9/22/10. At her 2/10/11 follow-up visit the CEA had increased from 1/8 to 3.5. The CEA was repeated one month later found to have increased further to 4.5. She was referred for CT scans which revealed a 10.6 x 6.5 cm left adnexal mass. Mammogram 10/25/10 was benign. MRI of the abdomen 11/8/10 indicated an indeterminate lesion post segment of the right hepatic lobe thought to be a cyst was stable compared to prior scan. CXR was clear.    Because of the possibility of a new ovarian primary versus metastatic colon carcinoma revealed in the CT, she underwent resection by Dr. 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 She was initiated on adjuvant Bevacizumab, Irinotecan, and Xeloda 5/18/11. She did not experience diarrhea but did have several days of nausea that was well controlled with Zofran. No palmar/plantar complaints. Patient completed 8 courses of treatment on 10/14/11. Tumor was found positive for KRAS mutation and  unresponsive to EGFR inhibitor agents.    CT abdomen/pelvis 8/29/11 revealed no new metastatic lesion. There were 2 lesions in the liver which were present in the 2/2010 films that were very slightly enlarged.    PET/CT 11/1/11 showed no metastasis. She did have vague uptake in the terminal ileum related to the ileitis for which she was admitted to [facility] 10/29/11 and resolved.    PET/CT 2/9/2012 showed no definite residual malignancy or metastatic disease; new mildly increased patchy FDG uptake in the L-hepatic lobe indeterminate with no corresponding CT lesion. US was diagnostic of a hemangioma.    She underwent colonoscopy 6/22/2012 which revealed 2 small adenomatous polyps with no dysplasia. Dr. Vences recommended colonoscopy in one year.    PET/CT 2/2012: no evidence of disease.  PET/CT 8/27/12: no evidence of metastatic disease with incidental cystic structure in the left hemipelvis noted on prior exams.  PET/CT 12/5/12: low density cystic mass at left anterior adnexa, probable lymphocele no increased FDG uptake. No evidence of metastatic disease.  PET/CT 3/13: no evidence of disease. CEA=2.3  CT scans 9/26/2013 with no acute findings suspicious for recurrent disease. Stable simple-appearing cystic lesion in the left hemipelvis has not changed in size measuring 7 x 4.8 cm possibly representing postoperative seroma. CEA=2.4  Repeat PET/CT 1/10/14 (following rise in CEA to 4.0) showed LENNY.   CT scan of the abdomen 04/03/2014 secondary to abdominal pain showed partial small bowel obstruction  CT 7/16/14 of C/A/P:  no evidence of new metastases.  Post operative large seroma noted measuring 19 cm, along with left pelvic fluid collection.   CT scans done January 13, 2015 show approximately 1.4 cm hypodense lesion in the posterior portion of hepatic segment 7 which is more conspicuous on the current study but unchanged in size from the prior exam.  However, no comment was made of this lesion from the radiologist's  report from the prior scans in July 2014.    CEA = 4.0 with prior CEA of 3.0  Restaging CT scans done 7/14/15--> LENNY. Stable benign right posterior liver lesion measuring 1.4 cm.  CEA WNL  repeat CEA from October 2015 = 4.  Repeat CT C/A/P done 02/03/16 LENNY--->02/03/16 CEA =4  Colonoscopy done July 19, 2016 was unremarkable.  CT scans of the chest abdomen pelvis done August 5, 2016 show slightly larger fluid collection anterior abdomen with stable hypodensity posterior aspect of the right hepatic lobe.  No new lesions.   CEA from August 5, 2016 = 6  Repeat CEA from November 1, 2016 = 7, increased slightly from August.  Repeat CT scans on February 2, 2017 shows stable posterior right hepatic lobe density measuring 1.2 cm, with stable subcutaneous fluid collection, and stable left adnexal cystic lesion.  CEA = 8  Repeat CEA from May 5, 2017 stable = 8  Patient was admitted August 6, 2017 after presenting with abdominal pain that began the day before with subsequent  development of vomiting episodes.    CT scan done August 6, 2017 at the time of presentation revealed dilated loops of small bowel likely consistent with ileus or partial obstruction.  Redemonstration of moderate left hydroureteronephrosis with obstruction at the level of the pelvic inlet adjacent to the previously described soft tissue density and cystic lesion.  The patient had undergone restaging CT scans 2 days prior in August 4, 2017 as part of her routine follow-up visit with me which revealed 4 mm nodule along the right minor fissure likely benign etiology--with stable findings compared to prior scans.  Within the abdomen and pelvis, moderate left hydronephrosis with left ureter obstructed at the level of the pelvic inlet adjacent to a 5.1 x 6.4 cm cyst along with a 3.0 x 2.1 cm soft tissue density respectively.  Radiologist comments that the soft tissue density had measured 2.8 x 2.1 cm on prior scan from February 27, 2016.  However, when looking  back at the radiology report from February 2016, no mention was made of such a lesion.  Urology and surgery were consulted.  Urology placed a JJ stent on the left and Dr. Zaragoza is a general surgery opted for conservative management, with subsequent resolution of her symptoms.  At the time of this consultation August 10, 2017, she is moving her bowels and eating well without difficulty with plan to discharge home and follow-up with Dr. Vega for potential biopsy.  She then underwent CT-guided biopsy of this lesion at [facility] ordered by Dr. Vega on August 29, 2017.  Pathology showed abdominal seroma with scattered lymphocytes and fibrin and no evidence of malignancy.  CEA from 11/14/17 = 11 (previously 8).   CT of C/A/P on 11/14 shows similar size of LLQ cystic and solid soft tissue lesions.   New/additional prominent mesenteric LN measuring 11 mm.    Improved hydronephrosis on the left.    PET/CT done November 28, 2017 shows previously described mesenteric lymph node with stable size without any appreciable FDG activity but with 2 cm focus of hypermetabolism at nearby segment of small salud with SUV uptake of l 5.4.  Additional 3 cm mass in the left lower quadrant which is hypermetabolic with SUV uptake of 11.6.  Additional 6 cm left lower quadrant cystic mass with no areas of hypermetabolism.  Patient was then referred back to Dr. Mihir Vega after her visit in late November, and then underwent exploratory laparotomy on femora 22nd 2018. She underwent extensive lysis of adhesions with resection of a retroperitoneal mass along with additional small bowel resection, as well as resection and primary anastomosis of left ureter. Pathology revealed metastatic adenocarcinoma involving the proximal and distal ureter, as well as metastatic adenocarcinoma from the primary retroperitoneal mass in addition to additional pieces of fibroadipose tissue also involved by disease.  The patient went home on February  26, 2018 and initially did well. She was readmitted to the hospital on February 28, 2018 secondary to worsening diarrhea and concern over possible obstruction. CT scans of the abdomen and pelvis were done which demonstrated multiple new hepatic mass lesions concerning for metastases. Postsurgical changes noted with small bowel loops suggestive of postoperative ileus.  The patient was managed conservatively/expectantly and discharged home March 5, 2018 in stable condition.  She was then set up to come back to see me March 8, 2018 in clinic.  Restaging CT chest done 3/20/18 showed No evidence of mets.    CEA =25.  Iron studies c/w iron deficiency.   Patient was then started on FOLFIRI as noted above with Cyramza added with cycle 3        Past medical history: Colon cancer with ovarian metastases, benign liver lesions, HTN, stress incontinence, osteoarthritis, GERD, umbilical hernia, small bowel obstruction secondary to hernia.  Procedure history: Incisional hernia repair 6/4/14, knee arthroplasty, colon resection, appendectomy, cholecystectomy, hysterectomy, colonoscopy, ileostomy and removal.   Auditory laparotomy February 22, 2018 as described above  Family history: Motherbreast cancer, fatherskin cancer  Social history: Patient lives at home with spouse. Retired. Regular diet, no exercise. Denies substance/tobacco use, never smoker.         Chief Complaint    Uncontrolled diarrhea      Interval History   Patient presents to clinic today for walk in visit with c/o uncontrolled diarrhea which began last weekend. She reports that she previously has been managing her diarrhea with Imodium but that this is no longer helping with her symptoms. Prescription was sent to begin Lomotil on Monday 5/7/18. Patient states that she took as prescribed without relief of symptoms. She reported to [facility] ED yesterday for further evaluation and was administered IV hydration, given additional prescription of Lomotil, and sent home. She  "then called clinic this morning with continued complaints of diarrhea and was scheduled for an appointment. When asked, patient states she has had 10-12 episodes of diarrhea since she was sent home from the ED yesterday evening. Labs today are surprisingly stable. Patient states she has continued with Lomotil as prescribed despite its ineffectiveness. She has not had much to eat over the last 24hrs "it just goes right through me." She denies fever, chills, or other signs of infection. No abdominal pain reported. Denies melena or bright red blood per rectum. Energy is understandably somewhat diminished.       Review of Systems   14 point review systems done full with pertinent positives as described in interval history. Otherwise reviewed and unremarkable.      Health Status   Allergies:    Allergic Reactions (Selected)  Severity Not Documented  Percocet 2.5/325- Nausea.,    Allergies (1) Active Reaction  Percocet 2.5/325 nausea     Current medications:  (Selected)   Outpatient Medications  Ordered  SandoSTATIN: 100 mcg, form: Infusion, IV Piggyback, Once, Infuse over: 2 hr, first dose 05/10/18 15:00:00 CDT, stop date 05/10/18 15:00:00 CDT  Prescriptions  Prescribed  Lomotil 2.5 mg-0.025 mg oral tablet: 2 tab(s), Oral, QID, PRN PRN for loose stools, X 3 day(s), # 24 tab(s), 0 Refill(s), Pharmacy: [id number]  Lomotil 2.5 mg-0.025 mg oral tablet: 2 tab(s), Oral, q6hr, every 6 hours as needed till diarrhea is controlled, # 14 tab(s), 0 Refill(s), Pharmacy: [id number]  Zofran ODT 8 mg oral tablet, disintegratin mg tabs, Oral, q6hr, PRN PRN nausea/vomiting, # 15 tab(s), 1 Refill(s), Pharmacy: [id number]  Documented Medications  Documented  Multivitamins and Minerals oral tablet: 0 Refill(s)  Zofran 8 mg oral tablet: See Instructions, 1 tab(s) Oral BID for 3 days after chemotherapy and q8hr PRN nausea, # 30, 4 Refill(s)  ZyPREXA 10 mg oral tablet: See Instructions, 1 tab(s) Oral morning " of and Daily for 2 days after chemotherapy, # 18, 1 Refill(s)  hydrochlorothiazide-valsartan 12.5 mg-80 mg oral tablet: 1 tab(s), Oral, Daily, # 30 tab(s), 0 Refill(s)   Problem list:    All Problems (Selected)  Benign neoplasm of liver / SNOMED CT 2484687165 / Confirmed  Hypertension / ICD-9-.9 / Confirmed  Cancer of descending colon / SNOMED CT 772645597 / Confirmed  Morbid obesity / SNOMED CT 617669823 / Probable  Polyp of colon / SNOMED CT 728992859 / Confirmed  Potential for infection / SNOMED CT 722907669 / Confirmed  Review of care plan / SNOMED CT 1900741240 / Confirmed  Liver metastases / SNOMED CT 480475728 / Confirmed  Ovarian metastasis / SNOMED CT 973714114 / Confirmed  Valvular heart disease / SNOMED CT 7339873 / Confirmed,    Active Problems (10)  Benign neoplasm of liver   Cancer of descending colon   Hypertension   Liver metastases   Morbid obesity   Ovarian metastasis   Polyp of colon   Potential for infection   Review of care plan   Valvular heart disease         Physical Examination   Vital Signs   5/10/2018 14:48 CDT      Temperature Oral          36.9 DegC                             Temperature Oral (calculated)             98.42 DegF                             Peripheral Pulse Rate     83 bpm                             Systolic Blood Pressure   134 mmHg                             Diastolic Blood Pressure  83 mmHg                             Mean Arterial Pressure, Cuff              100 mmHg     Measurements from flowsheet : Measurements   5/10/2018 15:07 CDT      Weight Dosing             88.2 kg                             Weight Measured           88.2 kg                             Weight Measured and Calculated in Lbs     194.45 lb                             Height/Length Dosing      152 cm                             Height/Length Measured    152 cm                             BSA Measured              1.93 m2                             Body Mass Index Measured  38.18 kg/m2                              Ideal Body Weight Calculated              45 kg     General:  Alert and oriented, No acute distress.    Eye:  Pupils are equal, round and reactive to light, Normal conjunctiva, Vision unchanged.    HENT:  Normocephalic, Oral mucosa is moist.    Neck:  Supple, Non-tender, No lymphadenopathy.    Respiratory:  Lungs are clear to auscultation, Respirations are non-labored, Breath sounds are equal.    Cardiovascular:  Normal rate, Regular rhythm, No edema.    Gastrointestinal:  Soft, Non-tender, Normal bowel sounds, Midline incision very well-healed.  No open areas   .    Genitourinary:  No inguinal tenderness.    Lymphatics:  No lymphadenopathy neck, axilla, groin.    Musculoskeletal:  No deformity, Normal gait.    Integumentary:  Warm, Dry, Intact.    Neurologic:  Alert, Oriented, No focal deficits.    Cognition and Speech:  Speech clear and coherent.    Psychiatric:  Cooperative, Appropriate mood & affect.    ECOG Performance Scale: 1- Strenuous physical activity restricted; fully ambulatory and able to carry out light work.      Review / Management   Results review:  Lab results   5/10/2018 13:35 CDT      POC TCO2                  23.0 mmol/L                             POC Hb                    14.3 mg/dL                             POC Hct                   42.0 %                             POC Sodium                139 mmol/L                             POC Potassium             3.1 mmol/L  LOW                             POC Chloride              102 mmol/L                             POC Ion Calcium           1.13 mmol/L                             POC Glucose               103 mg/dL                             POC BUN                   5.0 mg/dL  LOW                             POC Creatinine            1.0 mg/dL                             POC AGAP                  18.0  NA    5/10/2018 13:22 CDT      WBC                       8.2 x10(3)/mcL                             RBC                        4.38 x10(6)/mcL                             Hgb                       13.5 gm/dL                             Hct                       41.0 %                             Platelet                  282 x10(3)/mcL                             MCV                       93.6 fL                             MCH                       30.8 pg                             MCHC                      32.9 gm/dL  LOW                             RDW                       20.2 %  HI                             MPV                       8.5 fL  LOW                             Abs Neut                  6.22 x10(3)/mcL                             NEUT%                     76.0 %  NA                             NEUT#                     6.2 x10(3)/mcL                             LYMPH%                    15.6 %  NA                             LYMPH#                    1.3 x10(3)/mcL                             MONO%                     7.9 %  NA                             MONO#                     0.6 x10(3)/mcL                             EOS%                      0.4 %  NA                             EOS#                      0.0 x10(3)/mcL                             BASO%                     0.1 %  NA                             BASO#                     0.0 x10(3)/mcL  .       Impression and Plan   Diagnosis     1. 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 Patient had recurrence/mets to ovaries in 2011. See note below for more details.  KRAS mutation +  2. Benign liver lesion currently under surveillance.   4.  Stable left adnexal cystic lesion  5.  Soft tissue cystic and solid lesions in her LLQ  s/p CT guided biopsy 8/29/17, which was negative.   6.  Slowly rising CEA  7.  Left hydronephrosis status post JJ stent placement--? secondary  to #5  8.  Left upper quadrant and left lower quadrant masses as described on PET/CT both of which are FDG avid with additional non-FDG avid left lower quadrant cystic mass measuring 6 cm.  9. Recurrent metastatic colorectal cancer with disease noted in the retroperitoneum with involvement of the proximal and distal ureter status post exploratory laparotomy with resection and lysis of adhesions and primary anastomosis of left ureter performed February 22nd 2018 by Dr. Gary Pugh.  10.  Liver mets  11. Iron deficiency anemia  12. Intractable secretory diarrhea.     Plan:  Patient to be given 1000ml IV Hydration today  Will add Octreotide 50mcg/hr to fluids to hopefully help control her secretory diarrhea  Continue Lomotil as prescribed   Instructed patient to notify clinic in A.M. if symptoms do not improve and we will schedule for another infusion of Octreotide + IVF tomorrow as well   Notify clinic if fever presents or if symptoms worsen    Cora CARDONA, STACEYP-C.

## 2022-04-30 NOTE — PROGRESS NOTES
Patient:   Terri Penny            MRN: [id number]            FIN: [id number]               Age:   64 years     Sex:  Female     :  1955   Associated Diagnoses:   None   Author:   Clay ORTIZ MD, Desmond GAMBLE      Visit Information   PROBLEM LIST  1. 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 Patient had recurrence/mets to ovaries in . See note below for more details.  KRAS mutation +  2. Recurrent metastatic dz with  dz noted in the retroperitoneum with involvement of the proximal and distal ureter status post exploratory laparotomy with resection and lysis of adhesions and primary anastomosis of left ureter performed 2018 by Dr. Vega and Dr. Pugh.  3. Liver metastases along with mesenteric LAD.   4. Left hydroureteronephrosis s/p JJ stent placement during hospitalization in 2017.   5. Treatment induced diarrhea.  Much improved since stopping chemo.         CURRENT THERAPY:  1.  Started FOLFOX and bevacizumab on 3/24/2020.    TREATMENT HISTORY:  1.  Adjuvant XELIRI + Bevacizumab initiated 11, completed 10/2011  2.  FLOX chemotherapy 3/17/10, completed 8/11/10  3.  FOLFIRI plus Cyramza started 4-18 given every 3 weeks for tolerance.  Completed 23 cycles as of 2019.  Treatment was then discontinued based on worsening side effects and patient was given a treatment break.  4.  FOLFIRI plus Cyramza reinitiated cycle 24 on 12/10/2019 due to progressive disease in her liver -- Due for Cycle #28 on 3/10/2020 however discontinued due to disease progression.      HISTORY:  Terri Penny presented to the emergency room in [facility] 2/12/10, with an obstructed colon. A colonoscopy by Dr. Brambila revealed an obstructing friable lesion at  50cm in the region of the splenic flexure and descending colon. The scope was not able to be passed beyond the obstructing lesion. Dr. Zaragoza performed a colectomy with ileostomy placement as well as a cholecystectomy and appendectomy. Pathology revealed infiltrating moderately differentiated mucinous adenocarcinoma with tumor extension through all layers of the bowel wall and into the pericolonic fat, free surgical margins, and 0 of 46 lymph nodes. Also, acute appendicitis with perforation and a periappendiceal abscess as well as acute cholecystitis were found. The patient initiated adjuvant FLOX chemotherapy 3/17/10 and completed treatment on 8/11/10. Her ileostomy was reversed 9/22/10. At her 2/10/11 follow-up visit the CEA had increased from 1/8 to 3.5. The CEA was repeated one month later found to have increased further to 4.5. She was referred for CT scans which revealed a 10.6 x 6.5 cm left adnexal mass. Mammogram 10/25/10 was benign. MRI of the abdomen 11/8/10 indicated an indeterminate lesion post segment of the right hepatic lobe thought to be a cyst was stable compared to prior scan. CXR was clear.    Because of the possibility of a new ovarian primary versus metastatic colon carcinoma revealed in the CT, she underwent resection by Dr. 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 She was initiated on adjuvant Bevacizumab, Irinotecan, and Xeloda 5/18/11. She did not experience diarrhea but did have several days of nausea that was well controlled with Zofran. No palmar/plantar complaints. Patient completed 8 courses of treatment on 10/14/11. Tumor was found positive for KRAS mutation and unresponsive to EGFR inhibitor agents.    CT abdomen/pelvis 8/29/11 revealed no new metastatic lesion. There were 2 lesions in the liver which were present in the 2/2010 films that were very slightly enlarged.    PET/CT 11/1/11 showed no  metastasis. She did have vague uptake in the terminal ileum related to the ileitis for which she was admitted to [facility] 10/29/11 and resolved.    PET/CT 2/9/2012 showed no definite residual malignancy or metastatic disease; new mildly increased patchy FDG uptake in the L-hepatic lobe indeterminate with no corresponding CT lesion. US was diagnostic of a hemangioma.    She underwent colonoscopy 6/22/2012 which revealed 2 small adenomatous polyps with no dysplasia. Dr. Vences recommended colonoscopy in one year.    PET/CT 2/2012: no evidence of disease.  PET/CT 8/27/12: no evidence of metastatic disease with incidental cystic structure in the left hemipelvis noted on prior exams.  PET/CT 12/5/12: low density cystic mass at left anterior adnexa, probable lymphocele no increased FDG uptake. No evidence of metastatic disease.  PET/CT 3/13: no evidence of disease. CEA=2.3  CT scans 9/26/2013 with no acute findings suspicious for recurrent disease. Stable simple-appearing cystic lesion in the left hemipelvis has not changed in size measuring 7 x 4.8 cm possibly representing postoperative seroma. CEA=2.4  Repeat PET/CT 1/10/14 (following rise in CEA to 4.0) showed LENNY.   CT scan of the abdomen 04/03/2014 secondary to abdominal pain showed partial small bowel obstruction  CT 7/16/14 of C/A/P:  no evidence of new metastases.  Post operative large seroma noted measuring 19 cm, along with left pelvic fluid collection.   CT scans done January 13, 2015 show approximately 1.4 cm hypodense lesion in the posterior portion of hepatic segment 7 which is more conspicuous on the current study but unchanged in size from the prior exam.  However, no comment was made of this lesion from the radiologist's report from the prior scans in July 2014.    CEA = 4.0 with prior CEA of 3.0  Restaging CT scans done 7/14/15--> LENNY. Stable benign right posterior liver lesion measuring 1.4 cm.  CEA WNL  repeat CEA from October 2015 = 4.  Repeat CT C/A/P  done 02/03/16 LENNY--->02/03/16 CEA =4  Colonoscopy done July 19, 2016 was unremarkable.  CT scans of the chest abdomen pelvis done August 5, 2016 show slightly larger fluid collection anterior abdomen with stable hypodensity posterior aspect of the right hepatic lobe.  No new lesions.   CEA from August 5, 2016 = 6  Repeat CEA from November 1, 2016 = 7, increased slightly from August.  Repeat CT scans on February 2, 2017 shows stable posterior right hepatic lobe density measuring 1.2 cm, with stable subcutaneous fluid collection, and stable left adnexal cystic lesion.  CEA = 8  Repeat CEA from May 5, 2017 stable = 8  Patient was admitted August 6, 2017 after presenting with abdominal pain that began the day before with subsequent  development of vomiting episodes.    CT scan done August 6, 2017 at the time of presentation revealed dilated loops of small bowel likely consistent with ileus or partial obstruction.  Redemonstration of moderate left hydroureteronephrosis with obstruction at the level of the pelvic inlet adjacent to the previously described soft tissue density and cystic lesion.  The patient had undergone restaging CT scans 2 days prior in August 4, 2017 as part of her routine follow-up visit with me which revealed 4 mm nodule along the right minor fissure likely benign etiology--with stable findings compared to prior scans.  Within the abdomen and pelvis, moderate left hydronephrosis with left ureter obstructed at the level of the pelvic inlet adjacent to a 5.1 x 6.4 cm cyst along with a 3.0 x 2.1 cm soft tissue density respectively.  Radiologist comments that the soft tissue density had measured 2.8 x 2.1 cm on prior scan from February 27, 2016.  However, when looking back at the radiology report from February 2016, no mention was made of such a lesion.  Urology and surgery were consulted.  Urology placed a JJ stent on the left and Dr. Zaragoza is a general surgery opted for conservative management, with  subsequent resolution of her symptoms.  At the time of this consultation August 10, 2017, she is moving her bowels and eating well without difficulty with plan to discharge home and follow-up with Dr. Vega for potential biopsy.  She then underwent CT-guided biopsy of this lesion at [facility] ordered by Dr. Vega on August 29, 2017.  Pathology showed abdominal seroma with scattered lymphocytes and fibrin and no evidence of malignancy.  CEA from 11/14/17 = 11 (previously 8).   CT of C/A/P on 11/14 shows similar size of LLQ cystic and solid soft tissue lesions.   New/additional prominent mesenteric LN measuring 11 mm.    Improved hydronephrosis on the left.    PET/CT done November 28, 2017 shows previously described mesenteric lymph node with stable size without any appreciable FDG activity but with 2 cm focus of hypermetabolism at nearby segment of small salud with SUV uptake of l 5.4.  Additional 3 cm mass in the left lower quadrant which is hypermetabolic with SUV uptake of 11.6.  Additional 6 cm left lower quadrant cystic mass with no areas of hypermetabolism.  Patient was then referred back to Dr. Mihir Vega after her visit in late November, and then underwent exploratory laparotomy on femora 22nd 2018. She underwent extensive lysis of adhesions with resection of a retroperitoneal mass along with additional small bowel resection, as well as resection and primary anastomosis of left ureter. Pathology revealed metastatic adenocarcinoma involving the proximal and distal ureter, as well as metastatic adenocarcinoma from the primary retroperitoneal mass in addition to additional pieces of fibroadipose tissue also involved by disease.  The patient went home on February 26, 2018 and initially did well. She was readmitted to the hospital on February 28, 2018 secondary to worsening diarrhea and concern over possible obstruction. CT scans of the abdomen and pelvis were done which demonstrated multiple new  hepatic mass lesions concerning for metastases. Postsurgical changes noted with small bowel loops suggestive of postoperative ileus.  The patient was managed conservatively/expectantly and discharged home March 5, 2018 in stable condition.  She was then set up to come back to see me March 8, 2018 in clinic.  Restaging CT chest done 3/20/18 showed No evidence of mets.    CEA =25.  Iron studies c/w iron deficiency.   Patient was then started on FOLFIRI as noted above with Cyramza added with cycle 3.    Restaging CT scans of chest/abdomen/pelvis performed 7/20/18 noted persistent hepatice metastases which appear stable when compared to prior examination, postsurgical changes from prior left hemicolectomy with no evidence of residual or recurrent disease, seruma noted within the anterior soft tissues; corresponding CEA 8.0  Repeat CEA valentin 9/18/18 --4.9  Repeat CEA on 9/25/18--5.0  Repeat CEA on 10/17/18 --4.9  Restaging CT scans C/A/P done 12/3/18:   stable hypodense liver lesions noted, all uchanged, with the largest measuring 1.4 cm in segment 7, with no new or enlarging lesions elsewhere.    CEA = 4.9  Repeat CEA on 12/26 noted at 4.5  Repeat CEA on 1/17/19 noted at 4.6.   CEA on 2/5/19 = 5.1   Repeat CEA on 3/4/19 noted at 6.4   Restaging CT scans done March 22, 2019 with CT of the chest abdomen pelvis showed stable scattered hypodensities in the liver with stability in size and configuration compared to prior exam.  Largest in the posterior right lobe measuring 13 mm without change.  Small stable subcutaneous fluid collection over the anterior pelvis without significant changes.  No new lesions seen.  CEA  4/29/19 = 5.5  CEA on 5/20/19 = 5.2  Repeat CEA from 7/3/2019 = 5.6.   We will plan for restaging scans after cycle 23 to be done later in July.  Repeat CT C/A/P on 7/26/19 showed stable liver lesion to posterior right lobe, persistent fluid collecition in the subcutaneous tissue overlying the pelvis, unchanged;  no evidence of new metastatic disease. CEA noted at 6.9  She completed 23 cycles of treatment July 9, 2019 and then was put on treatment holiday as noted above following restaging scans that showed overall stability  Repeat CEA done 9/6/2019 = 5.3.  Previously 8.0.  Repeat CEA on 10/18/19 noted at 6.5   Patient [facility] at [facility] ED on 11/1/19 s/t abdominal pain/nausea/vomiting. Admitted for concern for small bowel obstruction. CT A/P obtained showed new/progressive hepatic metastases increased in size/number when compared to previous; enhancing 1.7cm nodule within the distal left ureter; findings concerning with small bowel obstructions/t mild enteritis and possible adhesions.  Seen by surgery for consideration of surgical repair however recommendations made for conservative treatment.   CEA on 11/15/19 noted at 14.6     CT of the thorax on 11/26/2019 showed no evidence of new progressive metastatic disease in the chest, however multiple hepatic metastases are mildly increased in size.  CEA was up to 19.9 on that same day.  Plan on reinitiating FOLFIRI and Cyramza on 12/10/2019.    CEA on 2/17/2020 was 27.2.  Restaging CT scans on 3/4/2020 showed no evidence of metastatic intrathoracic disease.  There was numerous metastatic liver lesions that are slightly larger and more apparent in the interval.  This suggest worsening disease burden.  The scattered mildly prominent abdominopelvic lymph nodes are unchanged in the interval with no new focal lesions or adenopathy identified.    CT-guided liver biopsy done on 3/18/2020 revealed metastatic colorectal adenocarcinoma, moderately differentiated.  Caris from that biopsy specimen showed a KRAS mutation, MSI was stable, tumor mutational burden was intermediate with 7 mutations per megabase, BRAF negative, PTEN positive.  This suggested increase benefit to FOLFOX plus bevacizumab.    CT of the chest, abdomen, and pelvis on 6/12/2020 showed stable metastatic involvement of the  liver with unchanged left hydroureteronephrosis with appearance of hyperdense lesion within the ureteral lumen.  There were no new findings.    Past medical history: Colon cancer with ovarian metastases, benign liver lesions, HTN, stress incontinence, osteoarthritis, GERD, umbilical hernia, small bowel obstruction secondary to hernia.  Procedure history: Incisional hernia repair 6/4/14, knee arthroplasty, colon resection, appendectomy, cholecystectomy, hysterectomy, colonoscopy, ileostomy and removal.   Auditory laparotomy February 22, 2018 as described above  Family history: Motherbreast cancer, fatherskin cancer  Social history: Patient lives at home with spouse. Retired. Regular diet, no exercise. Denies substance/tobacco use, never smoker.         Chief Complaint    Follow Up      Interval History   Patient here for scheduled follow up and for cycle 5 of FOLFOX and bevacizumab along with scan results.  Overall she is doing well.  She stated that she actually tolerated this last cycle of treatment well without any major side effects.  She had some diarrhea, but denies any nausea or vomiting.  She states that her energy was good throughout her weeks off.  He is happy to hear that her scans show no evidence of progression.      Review of Systems   14 point review systems done full with pertinent positives as described in interval history. Otherwise reviewed and unremarkable.      Health Status   Allergies:    Allergic Reactions (Selected)  Severity Not Documented  Percocet 2.5/325- Nausea.,    Allergies (1) Active Reaction  Percocet 2.5/325 nausea     Current medications:  (Selected)   Outpatient Medications  Ordered  Aloxi (for IVPB): 0.25 mg, IV Piggyback, Once-chemo, Infuse over: 0.3 hr, first dose 01/02/19 9:40:00 CST, stop date 01/02/19 9:40:00 CST, Days 1  Benadryl 50mg/ml Inj: 25 mg, IV Piggyback, Once-chemo, Infuse over: 20 minute(s), first dose 01/02/19 9:40:00 CST, stop date 01/02/19 9:40:00 CST,  Routine, Days 1  CCA Atropine 0.4 mg/mL PF Injection: 0.25 mg, IV Piggyback, Once-chemo, Infuse over: 20 minute(s), first dose 01/02/19 9:40:00 CST, stop date 01/02/19 9:40:00 CST, Days 1  Camptosar (for IVPB): 250 mg, IV Piggyback, Once-chemo, Infuse over: 1.5 hr, first dose 01/02/19 10:00:00 CST, stop date 01/02/19 10:00:00 CST, Days 1  Fusilev (for IVPB): 300 mg, IV Piggyback, Once-chemo, Infuse over: 1.5 hr, first dose 01/02/19 10:00:00 CST, stop date 01/02/19 10:00:00 CST, Days 1  Sodium Chloride 0.9% 1000mL: form: Infusion, IV Piggyback, Once-chemo, Infuse over: 2 hr, first dose 05/11/18 12:50:00 CDT, stop date 05/11/18 12:50:00 CDT, Days 1  dexamethasone (for IVPB): 10 mg, IV Piggyback, Once-chemo, Infuse over: 20 minute(s), first dose 01/02/19 9:40:00 CST, stop date 01/02/19 9:40:00 CST, Days 1  fluorouracil (for IVPB) -CCA: 3,750 mg, IV Piggyback, Once-chemo, Infuse over: 46 hr, first dose 01/02/19 14:00:00 CST, stop date 01/02/19 14:00:00 CST, Days 1  fluorouracil (for IVPB) -CCA: 625 mg, IV Piggyback, Once-chemo, Infuse over: 30 minute(s), first dose 01/02/19 11:30:00 CST, stop date 01/02/19 11:30:00 CST, Days 1  fosaprepitant (for IVPB): 150 mg, form: Powder-Inj, IV Piggyback, Once-chemo, Infuse over: 30 minute(s), first dose 01/02/19 9:40:00 CST, stop date 01/02/19 9:40:00 CST, Days 1  Future  Aloxi (for IVPB): 0.25 mg, IV Piggyback, Once-chemo, Infuse over: 20 minute(s), *Est. first dose 06/16/20 8:54:00 CDT, *Est. stop date 06/16/20 8:54:00 CDT, Future Order, Days 1  Benadryl (for IVPB): 25 mg, IV Piggyback, Once-chemo, Infuse over: 20 minute(s), *Est. first dose 06/16/20 8:54:00 CDT, *Est. stop date 06/16/20 8:54:00 CDT, Future Order, Days 1  Heparin Flush 100 U/mL - 5 mL: 500 units, form: Injection, IV Push, Once-chemo, *Est. first dose 06/18/20 8:54:00 CDT, *Est. stop date 06/18/20 8:54:00 CDT, Routine, Days 3, Future Order  Leucovorin (for IVPB): 666 mg, form: Powder-Inj, IV Piggyback, Once-chemo,  Infuse over: 2 hr, *Est. first dose 20 9:14:00 CDT, *Est. stop date 20 9:14:00 CDT, Future Order, Days 1  MVASI  (for IVPB): 400.5 mg, form: Soln, IV Piggyback, Once-chemo, Infuse over: 30 minute(s), *Est. first dose 20 8:54:00 CDT, *Est. stop date 20 8:54:00 CDT, Future Order, Days 1  acetaminophen: 650 mg, form: Tab, Oral, Once-chemo, *Est. first dose 20 8:54:00 CDT, *Est. stop date 20 8:54:00 CDT, Routine, Pre-Med, Days 1, Future Order  dexamethasone (for IVPB): 10 mg, IV Piggyback, Once-chemo, Infuse over: 20 minute(s), *Est. first dose 20 8:54:00 CDT, *Est. stop date 20 8:54:00 CDT, Future Order, Days 1  fluorouracil (for IVPB) -CCA: 3,996 mg, IV Piggyback, Once-chemo, Infuse over: 46 hr, *Est. first dose 20 12:14:00 CDT, *Est. stop date 20 12:14:00 CDT, Future Order, Days 1  fluorouracil (for IVPB) -CCA: 666 mg, IV Piggyback, Once-chemo, Infuse over: 30 minute(s), *Est. first dose 20 11:14:00 CDT, *Est. stop date 20 11:14:00 CDT, Future Order, Days 1  fosaprepitant (for IVPB): 150 mg, IV Piggyback, Once-chemo, Infuse over: 30 minute(s), *Est. first dose 20 8:54:00 CDT, *Est. stop date 20 8:54:00 CDT, Future Order, Days 1  oxaliplatin (for IVPB): 141.525 mg, form: Injection, IV Piggyback, Once-chemo, Infuse over: 2 hr, *Est. first dose 20 9:14:00 CDT, *Est. stop date 20 9:14:00 CDT, Future Order, Days 1  Prescriptions  Prescribed  Lomotil 2.5 mg-0.025 mg oral tablet: 2 tab(s), Oral, q6hr, every 6 hours as needed till diarrhea is controlled, # 30 tab(s), 2 Refill(s), Pharmacy: [facility]/pharmacy #[id number], 152, cm, Height/Length Dosing, 20 8:42:00 CDT, 82.9, kg, Weight Dosing, 20 8:42:00 CDT  Phenergan 25 mg Tab: 25 mg = 1 tab(s), Oral, q6hr, # 60 tab(s), 3 Refill(s), Pharmacy: [place] [id number] [facility][id number], Patient Education Provided, Patient Verbalizes Understanding  Zofran ODT 4 mg oral tablet, disintegratin mg = 1  tab(s), Oral, TID, PRN PRN nausea, # 10 tab(s), 0 Refill(s), [id number], 152, cm, Height/Length Dosing, 20 9:03:00 CDT, 87.7, kg, Weight Dosing, 20 9:03:00 CDT  Zofran ODT 8 mg oral tablet, disintegratin mg tabs, Oral, q6hr, PRN PRN nausea/vomiting, # 15 tab(s), 1 Refill(s), [id number], 152, cm, Height/Length Dosing, 20 9:03:00 CDT, 87.7, kg, Weight Dosing, 20 9:03:00 CDT  dicyclomine 20 mg oral tablet: 20 mg = 1 tab(s), Oral, QID, # 28 tab(s), 0 Refill(s), [id number], 152, cm, Height/Length Dosing, 20 11:33:00 CDT, 87.4, kg, Weight Dosing, 20 11:33:00 CDT  olanzapine 10 mg oral tablet: See Instructions, 1 tab(s) Oral morning of chemo and daily for 2 days following chemo, # 18 tab(s), 0 Refill(s), [id number], 152, cm, Height/Length Dosing, 20 9:03:00 CDT, 87.7, kg, Weight Dosing, 20 9:03:00 CDT  ondansetron 8 mg oral tablet: 8 mg = 1 tab(s), Oral, QID, # 120 tab(s), 0 Refill(s), Pharmacy: [facility]/pharmacy #[id number], 152, cm, Height/Length Dosing, 20 8:42:00 CDT, 82.9, kg, Weight Dosing, 20 8:42:00 CDT  Documented Medications  Documented  Compazine: 0 Refill(s)  LISINOP/HCTZ TAB 10-12.5: 1 tab(s), Oral, Daily  Multivitamins and Minerals oral tablet: 1 tab, Oral, Daily, 0 Refill(s)  azithromycin 250 mg oral tablet: Oral  benzonatate 100 mg oral capsule: 100 mg, 1-2 cap(s), Oral, TID  levothyroxine 25 mcg (0.025 mg) oral tablet: 1 tab, Oral, Daily, on empty stomach   Problem list:    All Problems  Benign neoplasm of liver / SNOMED CT 6752665784 / Confirmed  Colon cancer screening / SNOMED CT 7469908912 / Confirmed  GERD - Gastro-esophageal reflux disease / SNOMED CT 8798083898 / Confirmed  Hypertension / ICD-9-.9 / Confirmed  Cancer of descending colon / SNOMED CT 496261669 / Confirmed  Morbid obesity / SNOMED CT 952793332 / Probable  Ovarian cancer / SNOMED CT 2602597378 /  Confirmed  Polyp of colon / SNOMED CT 385797421 / Confirmed  Potential for infection / SNOMED CT 373351406 / Confirmed  Review of care plan / SNOMED CT 0931324943 / Confirmed  Review of care plan / SNOMED CT 0871575420 / Confirmed  Liver metastases / SNOMED CT 333733006 / Confirmed  Ovarian metastasis / SNOMED CT 085531218 / Confirmed  Valvular heart disease / SNOMED CT 8596142 / Confirmed  Inactive: Alteration in nutrition / SNOMED CT 269528867  Problem automatically updated based on documentation on Nutritional Risk Factors or  Unintentional Weight Loss.  Inactive: At risk for aspiration / SNOMED CT 3331512311  Problem automatically updated based on documentation on Cough, Oxygen Therapy, Aspiration Risk, CN IX, X Swallowing Gag Reflex, GI Symptoms, Nutrition Risk Factors, and/or Enteral Tube Type.  Inactive: At risk for falls / SNOMED CT 519077047  Problem automatically updated based on documentation on History of Falls, History of Falls in last 3 months Florentino, Florentino Fall Risk Score and/or ADLs.  Inactive: At risk for nutritional problem / SNOMED CT 434213998  Problem automatically updated based on documentation on Nutritional Risk Factors or  Unintentional Weight Loss.  Inactive: At risk of healthcare associated infection / SNOMED CT 2186898812  Problem automatically updated based on Central IV Access Type, Arterial Line Site, PA Catheter Type, Pacemaker Type.  Inactive: At risk of pressure sore / SNOMED CT 065053433  [facility]  Problem added automatically by system based on documentation of Bowel Sounds as Hyperactive, Hyperactive, or Absent;  GI Symptoms as Hyperactive, Constipation, or Diarrhea and/or Passing Flatus as No.  Problem added automatically by system based on documentation of Bowel Sounds as Hyperactive, Hyperactive, or Absent;  GI Symptoms as Hyperactive, Constipation, or Diarrhea and/or Passing Flatus as No.  Inactive: Disorder of fluid balance / SNOMED CT  518103405  Problem automatically updated based on documentation on Edema, Anasarca, Edema, Generalized, Edema, Bilateral Periorbital, Edema, Face, Edema,, Bilateral Arm, Edema, Left Arm, Edema, Right Arm, Edema, Bilateral Hand, Edema, Left Hand, Edema, Right Hand, Edema, Labia, Edema, Scrotum, Edema, Penile, Edema, Bilateral Upper Leg, Edema, Bilateral Lower Leg, Edema, Bilateral Pretibial, Edema, Bilateral Ankle, Edema, Bilateral Pedal, GI Symptoms, Skin Turgor General, and/or Gestational Age Method.  Resolved: Acid reflux / SNOMED CT 794275884  Resolved: Alteration in patterns of urinary elimination / SNOMED CT 37723603  Problem automatically updated based on documentation on Genitourinary Symptoms and/or Urinary Elimination.  Resolved: At risk of UTI / SNOMED CT [id number]  Problem automatically updated based on documentation on Urinary Elimination.  Resolved: CA - cancer of colon / SNOMED CT [id number]  Resolved: Chemotherapy / SNOMED CT 298026435  Resolved: Heartburn / SNOMED CT [id number]  Resolved: Osteoarthritis / SNOMED CT 1310600940  Resolved: Pregnant / SNOMED CT 931205432  Resolved: Pregnant / SNOMED CT 576845295  Resolved: Pregnant / SNOMED CT 281013353  Resolved: Sensitive to cold / SNOMED CT 8984625729  Resolved: Sensitive to heat / SNOMED CT 6932528580  Resolved: Stress incontinence / ICD-9-.6  Resolved: Umbilical hernia / ICD-9-.1  Canceled: Radiation therapy care / SNOMED CT [id number],    Active Problems (14)  Benign neoplasm of liver   Cancer of descending colon   Colon cancer screening   GERD - Gastro-esophageal reflux disease   Hypertension   Liver metastases   Morbid obesity   Ovarian cancer   Ovarian metastasis   Polyp of colon   Potential for infection   Review of care plan   Review of care plan   Valvular heart disease         Physical Examination   Vital Signs   6/16/2020 8:24 CDT       Temperature Oral          36.6 DegC                              Temperature Oral (calculated)             97.88 DegF                             Peripheral Pulse Rate     97 bpm                             Respiratory Rate          20 br/min                             SpO2                      99 %                             Oxygen Therapy            Room air                             Systolic Blood Pressure   131 mmHg                             Diastolic Blood Pressure  78 mmHg                             Blood Pressure Location   Left arm                             Manual Cuff BP            No                             O2 SAT at rest            99 %     Measurements from flowsheet : Measurements   6/16/2020 8:24 CDT       Weight Dosing             89 kg                             Weight Measured           89 kg                             Weight Measured and Calculated in Lbs     196.21 lb                             Height/Length Dosing      152 cm                             Height/Length Measured    152 cm                             BSA Measured              1.94 m2                             Body Mass Index Measured  38.52 kg/m2     General:  Alert and oriented.         Ambulation status: Assistive devices ( Cane ).         Appearance: Overweight.         Skin: No clubbing, No edema, Not jaundiced.    Eye:  Pupils are equal, round and reactive to light, Normal conjunctiva, Vision unchanged.    HENT:  Normocephalic, Oral mucosa is moist.    Neck:  Supple, Non-tender, No lymphadenopathy.    Respiratory:  Respirations are non-labored, Breath sounds are equal, Symmetrical chest wall expansion.    Cardiovascular:  Normal rate, Regular rhythm, No edema.    Gastrointestinal:  Soft, Non-tender, Non-distended, Normal bowel sounds, Midline surgical scar   .    Genitourinary:  No costovertebral angle tenderness.    Musculoskeletal:       Mobility/ gait: Able to walk with a cane,    .    Integumentary:  Warm, Dry, Intact.    Neurologic:  Alert,  Oriented, No focal deficits.    Cognition and Speech:  Speech clear and coherent.    Psychiatric:  Cooperative, Appropriate mood & affect.    ECOG Performance Scale: 1- Strenuous physical activity restricted; fully ambulatory and able to carry out light work.      Review / Management   Results review:  Lab results: 6/16/2020 8:39 CDT       [facility]            Negative  .       Impression and Plan   Diagnosis     1. 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 Patient had recurrence/mets to ovaries in 2011. See note below for more details.  KRAS mutation +  2. Recurrent metastatic dz with  dz noted in the retroperitoneum with involvement of the proximal and distal ureter status post exploratory laparotomy with resection and lysis of adhesions and primary anastomosis of left ureter performed February 22nd 2018 by Dr. Gary Pugh, along with liver metastases and mesenteric adenopathy.  3. Left hydroureteronephrosis s/p JJ stent placement during hospitalization in August 2017.   4. Treatment induced diarrhea-imrpoved since stopping chemo.   5. Repeat CT A/P on 11/1/19 with noted increase in liver lesion; restaging chest CT on 11/26/2019 showed worsening disease in her liver, no new disease in her chest..     Plan:    -She started FOLFOX and bevacizumab, cycle 1 day 1 on 3/24/2020.    -Her CT scans after 4 cycles shows stable disease, we will continue with 10% dose reduction across the board and will continue with Emend support.  We will also continue with every 3-week treatment.    -We will continue her current antiemetics and antidiarrheal medications.    -She will return to clinic in 3 weeks to review the results of her CT scans and prior to cycle 5 of treatment.        All questions were answered to  the best of my ability and the patient understands the plan moving forward.    Desmond Williamson II, MD.

## 2022-04-30 NOTE — DISCHARGE SUMMARY
DISCHARGE DATE:  11/02/2019    DICTATED FOR:  Mihir Vega MD.    ADMIT DIAGNOSIS:  Abdominal pain.    DISCHARGE DIAGNOSIS:  Low-grade partial small bowel obstruction resolved with conservative management.    HOSPITAL COURSE:  Ms Penny is a 64-year-old female with a history of stage IV colon cancer, was admitted with abdominal pain, diagnosed with low-grade partial small bowel obstruction that resolved with conservative management and did not require any surgical intervention.  She was stable on discharge with followup arranged.        ______________________________  Maris Hill, FNP    JH/BETTINA  DD:  12/04/2019  Time:  02:06PM  DT:  12/04/2019  Time:  02:20PM  Job #:  [id number]

## 2022-04-30 NOTE — PROGRESS NOTES
Oncology Office Visit      Patient:   Terri Penny            MRN: [id number]            FIN: [id number]               Age:   64 years     Sex:  Female     :  1955   Associated Diagnoses:   None   Author:   Emerald RAMON, Anuradha Vaughan      Visit Information   PROBLEM LIST  1. 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 Patient had recurrence/mets to ovaries in . See note below for more details.  KRAS mutation +  2. Recurrent metastatic dz with  dz noted in the retroperitoneum with involvement of the proximal and distal ureter status post exploratory laparotomy with resection and lysis of adhesions and primary anastomosis of left ureter performed 2018 by Dr. Vega and Dr. Pugh.  3. Liver metastases along with mesenteric LAD.   4. Left hydroureteronephrosis s/p JJ stent placement during hospitalization in 2017.   5. Treatment induced diarrhea.  Much improved since stopping chemo.         CURRENT THERAPY:  FOLFIRI plus Cyramza reinitiating cycle 24 on 12/10/2019 due to progressive disease in her liver-- Due for Cycle #25 on 19, HELD s/t dx of pneumonia    TREATMENT HISTORY:  1. Adjuvant XELIRI + Bevacizumab initiated 11, completed 10/2011  2. FLOX chemotherapy 3/17/10, completed 8/11/10  3.  FOLFIRI plus Cyramza started 4-18 given every 3 weeks for tolerance.  Completed 23 cycles as of 2019.  Treatment was then discontinued based on worsening side effects and patient was given a treatment break.      HISTORY:  Terri Penny presented to the emergency room in [facility] 2/12/10, with an obstructed colon. A colonoscopy by Dr. Brambila revealed an obstructing friable lesion at 50cm in the region of the splenic flexure and descending  colon. The scope was not able to be passed beyond the obstructing lesion. Dr. Zaragoza performed a colectomy with ileostomy placement as well as a cholecystectomy and appendectomy. Pathology revealed infiltrating moderately differentiated mucinous adenocarcinoma with tumor extension through all layers of the bowel wall and into the pericolonic fat, free surgical margins, and 0 of 46 lymph nodes. Also, acute appendicitis with perforation and a periappendiceal abscess as well as acute cholecystitis were found. The patient initiated adjuvant FLOX chemotherapy 3/17/10 and completed treatment on 8/11/10. Her ileostomy was reversed 9/22/10. At her 2/10/11 follow-up visit the CEA had increased from 1/8 to 3.5. The CEA was repeated one month later found to have increased further to 4.5. She was referred for CT scans which revealed a 10.6 x 6.5 cm left adnexal mass. Mammogram 10/25/10 was benign. MRI of the abdomen 11/8/10 indicated an indeterminate lesion post segment of the right hepatic lobe thought to be a cyst was stable compared to prior scan. CXR was clear.    Because of the possibility of a new ovarian primary versus metastatic colon carcinoma revealed in the CT, she underwent resection by Dr. 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 She was initiated on adjuvant Bevacizumab, Irinotecan, and Xeloda 5/18/11. She did not experience diarrhea but did have several days of nausea that was well controlled with Zofran. No palmar/plantar complaints. Patient completed 8 courses of treatment on 10/14/11. Tumor was found positive for KRAS mutation and unresponsive to EGFR inhibitor agents.    CT abdomen/pelvis 8/29/11 revealed no new metastatic lesion. There were 2 lesions in the liver which were present in the 2/2010 films that were very slightly enlarged.    PET/CT 11/1/11 showed no metastasis. She did have vague uptake in the terminal ileum  related to the ileitis for which she was admitted to [facility] 10/29/11 and resolved.    PET/CT 2/9/2012 showed no definite residual malignancy or metastatic disease; new mildly increased patchy FDG uptake in the L-hepatic lobe indeterminate with no corresponding CT lesion. US was diagnostic of a hemangioma.    She underwent colonoscopy 6/22/2012 which revealed 2 small adenomatous polyps with no dysplasia. Dr. Vences recommended colonoscopy in one year.    PET/CT 2/2012: no evidence of disease.  PET/CT 8/27/12: no evidence of metastatic disease with incidental cystic structure in the left hemipelvis noted on prior exams.  PET/CT 12/5/12: low density cystic mass at left anterior adnexa, probable lymphocele no increased FDG uptake. No evidence of metastatic disease.  PET/CT 3/13: no evidence of disease. CEA=2.3  CT scans 9/26/2013 with no acute findings suspicious for recurrent disease. Stable simple-appearing cystic lesion in the left hemipelvis has not changed in size measuring 7 x 4.8 cm possibly representing postoperative seroma. CEA=2.4  Repeat PET/CT 1/10/14 (following rise in CEA to 4.0) showed LENNY.   CT scan of the abdomen 04/03/2014 secondary to abdominal pain showed partial small bowel obstruction  CT 7/16/14 of C/A/P:  no evidence of new metastases.  Post operative large seroma noted measuring 19 cm, along with left pelvic fluid collection.   CT scans done January 13, 2015 show approximately 1.4 cm hypodense lesion in the posterior portion of hepatic segment 7 which is more conspicuous on the current study but unchanged in size from the prior exam.  However, no comment was made of this lesion from the radiologist's report from the prior scans in July 2014.    CEA = 4.0 with prior CEA of 3.0  Restaging CT scans done 7/14/15--> LENNY. Stable benign right posterior liver lesion measuring 1.4 cm.  CEA WNL  repeat CEA from October 2015 = 4.  Repeat CT C/A/P done 02/03/16 LENNY--->02/03/16 CEA =4  Colonoscopy done July  19, 2016 was unremarkable.  CT scans of the chest abdomen pelvis done August 5, 2016 show slightly larger fluid collection anterior abdomen with stable hypodensity posterior aspect of the right hepatic lobe.  No new lesions.   CEA from August 5, 2016 = 6  Repeat CEA from November 1, 2016 = 7, increased slightly from August.  Repeat CT scans on February 2, 2017 shows stable posterior right hepatic lobe density measuring 1.2 cm, with stable subcutaneous fluid collection, and stable left adnexal cystic lesion.  CEA = 8  Repeat CEA from May 5, 2017 stable = 8  Patient was admitted August 6, 2017 after presenting with abdominal pain that began the day before with subsequent  development of vomiting episodes.    CT scan done August 6, 2017 at the time of presentation revealed dilated loops of small bowel likely consistent with ileus or partial obstruction.  Redemonstration of moderate left hydroureteronephrosis with obstruction at the level of the pelvic inlet adjacent to the previously described soft tissue density and cystic lesion.  The patient had undergone restaging CT scans 2 days prior in August 4, 2017 as part of her routine follow-up visit with me which revealed 4 mm nodule along the right minor fissure likely benign etiology--with stable findings compared to prior scans.  Within the abdomen and pelvis, moderate left hydronephrosis with left ureter obstructed at the level of the pelvic inlet adjacent to a 5.1 x 6.4 cm cyst along with a 3.0 x 2.1 cm soft tissue density respectively.  Radiologist comments that the soft tissue density had measured 2.8 x 2.1 cm on prior scan from February 27, 2016.  However, when looking back at the radiology report from February 2016, no mention was made of such a lesion.  Urology and surgery were consulted.  Urology placed a JJ stent on the left and Dr. Zaragoza is a general surgery opted for conservative management, with subsequent resolution of her symptoms.  At the time of this  consultation August 10, 2017, she is moving her bowels and eating well without difficulty with plan to discharge home and follow-up with Dr. Vega for potential biopsy.  She then underwent CT-guided biopsy of this lesion at [facility] ordered by Dr. Vega on August 29, 2017.  Pathology showed abdominal seroma with scattered lymphocytes and fibrin and no evidence of malignancy.  CEA from 11/14/17 = 11 (previously 8).   CT of C/A/P on 11/14 shows similar size of LLQ cystic and solid soft tissue lesions.   New/additional prominent mesenteric LN measuring 11 mm.    Improved hydronephrosis on the left.    PET/CT done November 28, 2017 shows previously described mesenteric lymph node with stable size without any appreciable FDG activity but with 2 cm focus of hypermetabolism at nearby segment of small salud with SUV uptake of l 5.4.  Additional 3 cm mass in the left lower quadrant which is hypermetabolic with SUV uptake of 11.6.  Additional 6 cm left lower quadrant cystic mass with no areas of hypermetabolism.  Patient was then referred back to Dr. Mihir Vega after her visit in late November, and then underwent exploratory laparotomy on femora 22nd 2018. She underwent extensive lysis of adhesions with resection of a retroperitoneal mass along with additional small bowel resection, as well as resection and primary anastomosis of left ureter. Pathology revealed metastatic adenocarcinoma involving the proximal and distal ureter, as well as metastatic adenocarcinoma from the primary retroperitoneal mass in addition to additional pieces of fibroadipose tissue also involved by disease.  The patient went home on February 26, 2018 and initially did well. She was readmitted to the hospital on February 28, 2018 secondary to worsening diarrhea and concern over possible obstruction. CT scans of the abdomen and pelvis were done which demonstrated multiple new hepatic mass lesions concerning for metastases. Postsurgical  changes noted with small bowel loops suggestive of postoperative ileus.  The patient was managed conservatively/expectantly and discharged home March 5, 2018 in stable condition.  She was then set up to come back to see me March 8, 2018 in clinic.  Restaging CT chest done 3/20/18 showed No evidence of mets.    CEA =25.  Iron studies c/w iron deficiency.   Patient was then started on FOLFIRI as noted above with Cyramza added with cycle 3.    Restaging CT scans of chest/abdomen/pelvis performed 7/20/18 noted persistent hepatice metastases which appear stable when compared to prior examination, postsurgical changes from prior left hemicolectomy with no evidence of residual or recurrent disease, seruma noted within the anterior soft tissues; corresponding CEA 8.0  Repeat CEA valentin 9/18/18 --4.9  Repeat CEA on 9/25/18--5.0  Repeat CEA on 10/17/18 --4.9  Restaging CT scans C/A/P done 12/3/18:   stable hypodense liver lesions noted, all uchanged, with the largest measuring 1.4 cm in segment 7, with no new or enlarging lesions elsewhere.    CEA = 4.9  Repeat CEA on 12/26 noted at 4.5  Repeat CEA on 1/17/19 noted at 4.6.   CEA on 2/5/19 = 5.1   Repeat CEA on 3/4/19 noted at 6.4   Restaging CT scans done March 22, 2019 with CT of the chest abdomen pelvis showed stable scattered hypodensities in the liver with stability in size and configuration compared to prior exam.  Largest in the posterior right lobe measuring 13 mm without change.  Small stable subcutaneous fluid collection over the anterior pelvis without significant changes.  No new lesions seen.  CEA  4/29/19 = 5.5  CEA on 5/20/19 = 5.2  Repeat CEA from 7/3/2019 = 5.6.   We will plan for restaging scans after cycle 23 to be done later in July.  Repeat CT C/A/P on 7/26/19 showed stable liver lesion to posterior right lobe, persistent fluid collecition in the subcutaneous tissue overlying the pelvis, unchanged; no evidence of new metastatic disease. CEA noted at 6.9  She  completed 23 cycles of treatment July 9, 2019 and then was put on treatment holiday as noted above following restaging scans that showed overall stability  Repeat CEA done 9/6/2019 = 5.3.  Previously 8.0.  Repeat CEA on 10/18/19 noted at 6.5   Patient [facility] ED on 11/1/19 s/t abdominal pain/nausea/vomiting. Admitted for concern for small bowel obstruction. CT A/P obtained showed new/progressive hepatic metastases increased in size/number when compared to previous; enhancing 1.7cm nodule within the distal left ureter; findings concerning with small bowel obstructions/t mild enteritis and possible adhesions.  Seen by surgery for consideration of surgical repair however recommendations made for conservative treatment.   CEA on 11/15/19 noted at 14. 6     CT of the thorax on 11/26/2019 showed no evidence of new progressive metastatic disease in the chest, however multiple hepatic metastases are mildly increased in size.  CEA was up to 19.9 on that same day.  Plan on reinitiating FOLFIRI and Cyramza on 12/10/2019.    Past medical history: Colon cancer with ovarian metastases, benign liver lesions, HTN, stress incontinence, osteoarthritis, GERD, umbilical hernia, small bowel obstruction secondary to hernia.  Procedure history: Incisional hernia repair 6/4/14, knee arthroplasty, colon resection, appendectomy, cholecystectomy, hysterectomy, colonoscopy, ileostomy and removal.   Auditory laparotomy February 22, 2018 as described above  Family history: Motherbreast cancer, fatherskin cancer  Social history: Patient lives at home with spouse. Retired. Regular diet, no exercise. Denies substance/tobacco use, never smoker.         Chief Complaint    Follow Up      Interval History   Patient presents for 1 week follow up. She was held 1 week ago due to pneumonia.  She was started on antibiotic therapy which she is currently taking as prescribed. She denies fever or chills. She continues to have a cough with no routine  sputum production. She is feeling well and ready to proceed with treatment, which she seems to tolerate well. She has the exptected diarrhea, but controlled with immodium. Her labs are adequate for treatment.       Review of Systems   14 point review systems done full with pertinent positives as described in interval history. Otherwise reviewed and unremarkable.      Health Status   Allergies:    Allergic Reactions (Selected)  Severity Not Documented  Percocet 2.5/325- Nausea.,    Allergies (1) Active Reaction  Percocet 2.5/325 nausea     Current medications:  (Selected)   Inpatient Medications  Ordered  Aloxi (for IVPB): 0.25 mg, 5 mL, 166.67 mL/hr, IV Piggyback, Once-chemo, Days 1  Benadryl 50mg/ml Inj: 25 mg, 0.5 mL, 150 mL/hr, IV Piggyback, Once-chemo, Days 1  CCA Atropine 0.4 mg/mL PF Injection: 0.25 mg, 0.63 mL, 150 mL/hr, IV Piggyback, Once-chemo, Days 1  Camptosar (for IVPB): 250 mg, 12.5 mL, 175 mL/hr, IV Piggyback, Once-chemo, Days 1  Fusilev (for IVPB): 300 mg, 6 vial(s), 166.67 mL/hr, IV Piggyback, Once-chemo, Days 1  Sodium Chloride 0.9% 1000mL: 1,000 mL, 500 mL/hr, IV Piggyback, Once-chemo, Days 1  dexamethasone (for IVPB): 10 mg, 1 mL, 153 mL/hr, IV Piggyback, Once-chemo, Days 1  fluorouracil (for IVPB) -CCA: 3,750 mg, 75 mL, 3 mL/hr, IV Piggyback, Once-chemo, Days 1  fluorouracil (for IVPB) -CCA: 625 mg, 12.5 mL, 225 mL/hr, IV Piggyback, Once-chemo, Days 1  fosaprepitant (for IVPB): 150 mg, 1 EA, 500 mL/hr, IV Piggyback, Once-chemo, Days 1  Future  heparin flush 100 units/mL (500 Unit  Flush): 500 units, 5 mL, IV Push, Once-chemo, Weeks 17  heparin flush 100 units/mL (500 Unit  Flush): 500 units, 5 mL, IV Push, Once-chemo, Weeks 25  Prescriptions  Prescribed  Lomotil 2.5 mg-0.025 mg oral tablet: 2 tab(s), Oral, q6hr, every 6 hours as needed till diarrhea is controlled, 30 tab(s), 2 Refill(s)  Phenergan 25 mg Tab: 25 mg, 1 tab(s), Oral, q6hr, 60 tab(s), 3 Refill(s)  Zofran 8 mg oral tablet: See  Instructions, 1 tab(s) Oral BID for 3 days after chemotherapy and q8hr PRN nausea, 60 tab(s), 1 Refill(s)  Zofran ODT 8 mg oral tablet, disintegratin mg tabs, Oral, q6hr, for 3 day(s), PRN: nausea/vomiting, 15 tab(s), 1 Refill(s)  ZyPREXA 10 mg oral tablet: See Instructions, 1 tab(s) Oral morning of and Daily for 2 days after chemotherapy, 18 tab(s), 0 Refill(s)  acetaminophen/chlorpheniramine/dextromethorphan/ mg-1 mg-5 mg-2.5 mg/5 mL oral liquid: 10 mL, Oral, q6hr, PRN: for cold symptoms, 270 mL, 0 Refill(s)  Documented Medications  Documented  Azithromycin 5 Day Dose Pack 250 mg oral tablet: 1 packet(s), Oral, Daily, for 5 day(s), as directed on package labeling, 6 tab(s), 0 Refill(s)  Compazine: 0 Refill(s)  LISINOP/HCTZ TAB 10-12.5: 1 tab(s), Daily  Multivitamins and Minerals oral tablet: 0 Refill(s)  levothyroxine 25 mcg (0.025 mg) oral tablet: Oral   Problem list:    Active Problems (11)  Benign neoplasm of liver   Cancer of descending colon   Hypertension   Liver metastases   Morbid obesity   Ovarian metastasis   Polyp of colon   Potential for infection   Review of care plan   Review of care plan   Valvular heart disease         Physical Examination   Vital Signs   2020 8:58 CST        Temperature Oral          36.8 DegC                             Temperature Oral (calculated)             98.24 DegF                             Peripheral Pulse Rate     77 bpm                             Systolic Blood Pressure   128 mmHg                             Diastolic Blood Pressure  69 mmHg     General:  Alert and oriented.         Ambulation status: Assistive devices ( Cane ).         Appearance: Overweight.         Skin: No clubbing, No edema, Not jaundiced.    Eye:  Pupils are equal, round and reactive to light, Normal conjunctiva, Vision unchanged.    HENT:  Normocephalic, Oral mucosa is moist.    Neck:  Supple, Non-tender, No lymphadenopathy.    Respiratory:  Respirations are non-labored, Breath  sounds are equal, Symmetrical chest wall expansion.         Cough: Productive.    Cardiovascular:  Normal rate, Regular rhythm, No edema.    Gastrointestinal:  Soft, Non-tender, Non-distended, Normal bowel sounds, Midline surgical scar   .    Genitourinary:  No costovertebral angle tenderness.    Musculoskeletal:       Mobility/ gait: Able to walk with a cane,    .    Integumentary:  Warm, Dry, Intact.    Neurologic:  Alert, Oriented, No focal deficits.    Cognition and Speech:  Speech clear and coherent.    Psychiatric:  Cooperative, Appropriate mood & affect.    ECOG Performance Scale: 1- Strenuous physical activity restricted; fully ambulatory and able to carry out light work.      Review / Management   Results review:  Lab results   1/6/2020 9:01 CST        POC Hb                    13.9 mg/dL                             POC Hct                   41.0 %                             POC Sodium                138 mmol/L                             POC Potassium             4.2 mmol/L                             POC Chloride              104 mmol/L                             POC Ion Calcium           1.17 mmol/L                             POC Glucose               97 mg/dL                             POC BUN                   28.0 mg/dL  HI                             POC Creatinine            1.1 mg/dL                             POC AGAP                  11.0  NA    1/6/2020 8:46 CST        WBC                       9.5 x10(3)/mcL                             RBC                       4.28 x10(6)/mcL                             Hgb                       12.7 gm/dL                             Hct                       40.8 %                             Platelet                  256 x10(3)/mcL                             MCV                       95.3 fL  HI                             MCH                       29.7 pg                             MCHC                      31.1 gm/dL  LOW                              RDW                       17.0 %                             MPV                       8.9 fL  LOW                             Abs Neut                  6.04 x10(3)/mcL                             NEUT%                     63.6 %  NA                             NEUT#                     6.0 x10(3)/mcL                             LYMPH%                    24.7 %  NA                             LYMPH#                    2.4 x10(3)/mcL                             MONO%                     9.4 %  NA                             MONO#                     0.9 x10(3)/mcL                             EOS%                      1.2 %  NA                             EOS#                      0.1 x10(3)/mcL                             BASO%                     0.4 %  NA                             BASO#                     0.0 x10(3)/mcL  .       Impression and Plan   Diagnosis     1. 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 Patient had recurrence/mets to ovaries in 2011. See note below for more details.  KRAS mutation +  2. Recurrent metastatic dz with  dz noted in the retroperitoneum with involvement of the proximal and distal ureter status post exploratory laparotomy with resection and lysis of adhesions and primary anastomosis of left ureter performed February 22nd 2018 by Dr. Gary Pugh, along with liver metastases and mesenteric adenopathy.  3. Left hydroureteronephrosis s/p JJ stent placement during hospitalization in August 2017.   4. Treatment induced diarrhea-imrpoved since stopping chemo.   5. Repeat CT A/P on 11/1/19 with noted increase in liver lesion; restaging chest CT on 11/26/2019 showed worsening disease in her liver, no new disease in her chest..     Plan:  Proceed with Cycle # 25 of Folfiri +  Cyramza on 1/7/20.Continue antibiotics as prescribed   Continue PRN antidiarrheals as needed   Rx for antitussive sent to pharmacy   RTC in 3 weeks for TD visit   CBC, BMP same day lab    .

## 2022-04-30 NOTE — PROGRESS NOTES
Patient:   Terri Penny            MRN: [id number]            FIN: [id number]               Age:   64 years     Sex:  Female     :  1955   Associated Diagnoses:   None   Author:   James RAMON, Cora DUQUE      Visit Information   PROBLEM LIST  1. 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 Patient had recurrence/mets to ovaries in . See note below for more details.  KRAS mutation +  2. Recurrent metastatic dz with  dz noted in the retroperitoneum with involvement of the proximal and distal ureter status post exploratory laparotomy with resection and lysis of adhesions and primary anastomosis of left ureter performed 2018 by Dr. Vega and Dr. Pugh.  3. Liver metastases along with mesenteric LAD.   4. Left hydroureteronephrosis s/p JJ stent placement during hospitalization in 2017.   5. Treatment induced diarrhea.  Much improved since stopping chemo.         CURRENT THERAPY:  FOLFIRI plus Cyramza reinitiating cycle 24 on 12/10/2019 due to progressive disease in her liver-- Due for Cycle #25 on 19, HELD s/t dx of pneumonia    TREATMENT HISTORY:  1. Adjuvant XELIRI + Bevacizumab initiated 11, completed 10/2011  2. FLOX chemotherapy 3/17/10, completed 8/11/10  3.  FOLFIRI plus Cyramza started 4-18 given every 3 weeks for tolerance.  Completed 23 cycles as of 2019.  Treatment was then discontinued based on worsening side effects and patient was given a treatment break.      HISTORY:  Terri Penny presented to the emergency room in [facility] 2/12/10, with an obstructed colon. A colonoscopy by Dr. Brambila revealed an obstructing friable lesion at 50cm in the region of the splenic flexure and descending colon. The scope was not  able to be passed beyond the obstructing lesion. Dr. Zaragoza performed a colectomy with ileostomy placement as well as a cholecystectomy and appendectomy. Pathology revealed infiltrating moderately differentiated mucinous adenocarcinoma with tumor extension through all layers of the bowel wall and into the pericolonic fat, free surgical margins, and 0 of 46 lymph nodes. Also, acute appendicitis with perforation and a periappendiceal abscess as well as acute cholecystitis were found. The patient initiated adjuvant FLOX chemotherapy 3/17/10 and completed treatment on 8/11/10. Her ileostomy was reversed 9/22/10. At her 2/10/11 follow-up visit the CEA had increased from 1/8 to 3.5. The CEA was repeated one month later found to have increased further to 4.5. She was referred for CT scans which revealed a 10.6 x 6.5 cm left adnexal mass. Mammogram 10/25/10 was benign. MRI of the abdomen 11/8/10 indicated an indeterminate lesion post segment of the right hepatic lobe thought to be a cyst was stable compared to prior scan. CXR was clear.    Because of the possibility of a new ovarian primary versus metastatic colon carcinoma revealed in the CT, she underwent resection by Dr. 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 She was initiated on adjuvant Bevacizumab, Irinotecan, and Xeloda 5/18/11. She did not experience diarrhea but did have several days of nausea that was well controlled with Zofran. No palmar/plantar complaints. Patient completed 8 courses of treatment on 10/14/11. Tumor was found positive for KRAS mutation and unresponsive to EGFR inhibitor agents.    CT abdomen/pelvis 8/29/11 revealed no new metastatic lesion. There were 2 lesions in the liver which were present in the 2/2010 films that were very slightly enlarged.    PET/CT 11/1/11 showed no metastasis. She did have vague uptake in the terminal ileum related to the ileitis for  which she was admitted to [facility] 10/29/11 and resolved.    PET/CT 2/9/2012 showed no definite residual malignancy or metastatic disease; new mildly increased patchy FDG uptake in the L-hepatic lobe indeterminate with no corresponding CT lesion. US was diagnostic of a hemangioma.    She underwent colonoscopy 6/22/2012 which revealed 2 small adenomatous polyps with no dysplasia. Dr. Vences recommended colonoscopy in one year.    PET/CT 2/2012: no evidence of disease.  PET/CT 8/27/12: no evidence of metastatic disease with incidental cystic structure in the left hemipelvis noted on prior exams.  PET/CT 12/5/12: low density cystic mass at left anterior adnexa, probable lymphocele no increased FDG uptake. No evidence of metastatic disease.  PET/CT 3/13: no evidence of disease. CEA=2.3  CT scans 9/26/2013 with no acute findings suspicious for recurrent disease. Stable simple-appearing cystic lesion in the left hemipelvis has not changed in size measuring 7 x 4.8 cm possibly representing postoperative seroma. CEA=2.4  Repeat PET/CT 1/10/14 (following rise in CEA to 4.0) showed LENNY.   CT scan of the abdomen 04/03/2014 secondary to abdominal pain showed partial small bowel obstruction  CT 7/16/14 of C/A/P:  no evidence of new metastases.  Post operative large seroma noted measuring 19 cm, along with left pelvic fluid collection.   CT scans done January 13, 2015 show approximately 1.4 cm hypodense lesion in the posterior portion of hepatic segment 7 which is more conspicuous on the current study but unchanged in size from the prior exam.  However, no comment was made of this lesion from the radiologist's report from the prior scans in July 2014.    CEA = 4.0 with prior CEA of 3.0  Restaging CT scans done 7/14/15--> LENNY. Stable benign right posterior liver lesion measuring 1.4 cm.  CEA WNL  repeat CEA from October 2015 = 4.  Repeat CT C/A/P done 02/03/16 LENNY--->02/03/16 CEA =4  Colonoscopy done July 19, 2016 was unremarkable.   CT scans of the chest abdomen pelvis done August 5, 2016 show slightly larger fluid collection anterior abdomen with stable hypodensity posterior aspect of the right hepatic lobe.  No new lesions.   CEA from August 5, 2016 = 6  Repeat CEA from November 1, 2016 = 7, increased slightly from August.  Repeat CT scans on February 2, 2017 shows stable posterior right hepatic lobe density measuring 1.2 cm, with stable subcutaneous fluid collection, and stable left adnexal cystic lesion.  CEA = 8  Repeat CEA from May 5, 2017 stable = 8  Patient was admitted August 6, 2017 after presenting with abdominal pain that began the day before with subsequent  development of vomiting episodes.    CT scan done August 6, 2017 at the time of presentation revealed dilated loops of small bowel likely consistent with ileus or partial obstruction.  Redemonstration of moderate left hydroureteronephrosis with obstruction at the level of the pelvic inlet adjacent to the previously described soft tissue density and cystic lesion.  The patient had undergone restaging CT scans 2 days prior in August 4, 2017 as part of her routine follow-up visit with me which revealed 4 mm nodule along the right minor fissure likely benign etiology--with stable findings compared to prior scans.  Within the abdomen and pelvis, moderate left hydronephrosis with left ureter obstructed at the level of the pelvic inlet adjacent to a 5.1 x 6.4 cm cyst along with a 3.0 x 2.1 cm soft tissue density respectively.  Radiologist comments that the soft tissue density had measured 2.8 x 2.1 cm on prior scan from February 27, 2016.  However, when looking back at the radiology report from February 2016, no mention was made of such a lesion.  Urology and surgery were consulted.  Urology placed a JJ stent on the left and Dr. Zaragoza is a general surgery opted for conservative management, with subsequent resolution of her symptoms.  At the time of this consultation August 10, 2017,  she is moving her bowels and eating well without difficulty with plan to discharge home and follow-up with Dr. Vega for potential biopsy.  She then underwent CT-guided biopsy of this lesion at [facility] ordered by Dr. Vega on August 29, 2017.  Pathology showed abdominal seroma with scattered lymphocytes and fibrin and no evidence of malignancy.  CEA from 11/14/17 = 11 (previously 8).   CT of C/A/P on 11/14 shows similar size of LLQ cystic and solid soft tissue lesions.   New/additional prominent mesenteric LN measuring 11 mm.    Improved hydronephrosis on the left.    PET/CT done November 28, 2017 shows previously described mesenteric lymph node with stable size without any appreciable FDG activity but with 2 cm focus of hypermetabolism at nearby segment of small salud with SUV uptake of l 5.4.  Additional 3 cm mass in the left lower quadrant which is hypermetabolic with SUV uptake of 11.6.  Additional 6 cm left lower quadrant cystic mass with no areas of hypermetabolism.  Patient was then referred back to Dr. Mihir Vega after her visit in late November, and then underwent exploratory laparotomy on femora 22nd 2018. She underwent extensive lysis of adhesions with resection of a retroperitoneal mass along with additional small bowel resection, as well as resection and primary anastomosis of left ureter. Pathology revealed metastatic adenocarcinoma involving the proximal and distal ureter, as well as metastatic adenocarcinoma from the primary retroperitoneal mass in addition to additional pieces of fibroadipose tissue also involved by disease.  The patient went home on February 26, 2018 and initially did well. She was readmitted to the hospital on February 28, 2018 secondary to worsening diarrhea and concern over possible obstruction. CT scans of the abdomen and pelvis were done which demonstrated multiple new hepatic mass lesions concerning for metastases. Postsurgical changes noted with small bowel  loops suggestive of postoperative ileus.  The patient was managed conservatively/expectantly and discharged home March 5, 2018 in stable condition.  She was then set up to come back to see me March 8, 2018 in clinic.  Restaging CT chest done 3/20/18 showed No evidence of mets.    CEA =25.  Iron studies c/w iron deficiency.   Patient was then started on FOLFIRI as noted above with Cyramza added with cycle 3.    Restaging CT scans of chest/abdomen/pelvis performed 7/20/18 noted persistent hepatice metastases which appear stable when compared to prior examination, postsurgical changes from prior left hemicolectomy with no evidence of residual or recurrent disease, seruma noted within the anterior soft tissues; corresponding CEA 8.0  Repeat CEA valentin 9/18/18 --4.9  Repeat CEA on 9/25/18--5.0  Repeat CEA on 10/17/18 --4.9  Restaging CT scans C/A/P done 12/3/18:   stable hypodense liver lesions noted, all uchanged, with the largest measuring 1.4 cm in segment 7, with no new or enlarging lesions elsewhere.    CEA = 4.9  Repeat CEA on 12/26 noted at 4.5  Repeat CEA on 1/17/19 noted at 4.6.   CEA on 2/5/19 = 5.1   Repeat CEA on 3/4/19 noted at 6.4   Restaging CT scans done March 22, 2019 with CT of the chest abdomen pelvis showed stable scattered hypodensities in the liver with stability in size and configuration compared to prior exam.  Largest in the posterior right lobe measuring 13 mm without change.  Small stable subcutaneous fluid collection over the anterior pelvis without significant changes.  No new lesions seen.  CEA  4/29/19 = 5.5  CEA on 5/20/19 = 5.2  Repeat CEA from 7/3/2019 = 5.6.   We will plan for restaging scans after cycle 23 to be done later in July.  Repeat CT C/A/P on 7/26/19 showed stable liver lesion to posterior right lobe, persistent fluid collecition in the subcutaneous tissue overlying the pelvis, unchanged; no evidence of new metastatic disease. CEA noted at 6.9  She completed 23 cycles of  "treatment July 9, 2019 and then was put on treatment holiday as noted above following restaging scans that showed overall stability  Repeat CEA done 9/6/2019 = 5.3.  Previously 8.0.  Repeat CEA on 10/18/19 noted at 6.5   Patient seen at [facility] ED on 11/1/19 s/t abdominal pain/nausea/vomiting. Admitted for concern for small bowel obstruction. CT A/P obtained showed new/progressive hepatic metastases increased in size/number when compared to previous; enhancing 1.7cm nodule within the distal left ureter; findings concerning with small bowel obstructions/t mild enteritis and possible adhesions.  Seen by surgery for consideration of surgical repair however recommendations made for conservative treatment.   CEA on 11/15/19 noted at 14. 6     CT of the thorax on 11/26/2019 showed no evidence of new progressive metastatic disease in the chest, however multiple hepatic metastases are mildly increased in size.  CEA was up to 19.9 on that same day.  Plan on reinitiating FOLFIRI and Cyramza on 12/10/2019.    Past medical history: Colon cancer with ovarian metastases, benign liver lesions, HTN, stress incontinence, osteoarthritis, GERD, umbilical hernia, small bowel obstruction secondary to hernia.  Procedure history: Incisional hernia repair 6/4/14, knee arthroplasty, colon resection, appendectomy, cholecystectomy, hysterectomy, colonoscopy, ileostomy and removal.   Auditory laparotomy February 22, 2018 as described above  Family history: Motherbreast cancer, fatherskin cancer  Social history: Patient lives at home with spouse. Retired. Regular diet, no exercise. Denies substance/tobacco use, never smoker.         Chief Complaint    Follow Up      Interval History   Patient presents to clinic today for scheduled follow up with  present- currently due for cycle #25 on 12/31/19. However patient reports she was recently seen at walk-in clinic over the weekend and was diagnosed with "pneumonia." She was started on " "antibiotic therapy which she is currently taking as prescribed. She denies fever or chills. Her main complaint today is that of continued cough which is keeping her up at night. She reports she had some "mild" diarrhea symptoms following Cycle #1 of treatment but states this was well managed with prescribed antidiarrheals. Denies HA, SOB, CP, N/V/C. No melena or BRBPR. Denies changes in appetite. Labs today reviewed and stable overall as noted below. Denies need for medication refill at this time.       Review of Systems   14 point review systems done full with pertinent positives as described in interval history. Otherwise reviewed and unremarkable.      Health Status   Allergies:    Allergic Reactions (Selected)  Severity Not Documented  Percocet 2.5/325- Nausea.,    Allergies (1) Active Reaction  Percocet 2.5/325 nausea     Current medications:  (Selected)   Outpatient Medications  Ordered  Aloxi (for IVPB): 0.25 mg, IV Piggyback, Once-chemo, Infuse over: 0.3 hr, first dose 01/02/19 9:40:00 CST, stop date 01/02/19 9:40:00 CST, Days 1  Benadryl 50mg/ml Inj: 25 mg, IV Piggyback, Once-chemo, Infuse over: 20 minute(s), first dose 01/02/19 9:40:00 CST, stop date 01/02/19 9:40:00 CST, Routine, Days 1  CCA Atropine 0.4 mg/mL PF Injection: 0.25 mg, IV Piggyback, Once-chemo, Infuse over: 20 minute(s), first dose 01/02/19 9:40:00 CST, stop date 01/02/19 9:40:00 CST, Days 1  Camptosar (for IVPB): 250 mg, IV Piggyback, Once-chemo, Infuse over: 1.5 hr, first dose 01/02/19 10:00:00 CST, stop date 01/02/19 10:00:00 CST, Days 1  Fusilev (for IVPB): 300 mg, IV Piggyback, Once-chemo, Infuse over: 1.5 hr, first dose 01/02/19 10:00:00 CST, stop date 01/02/19 10:00:00 CST, Days 1  Sodium Chloride 0.9% 1000mL: form: Infusion, IV Piggyback, Once-chemo, Infuse over: 2 hr, first dose 05/11/18 12:50:00 CDT, stop date 05/11/18 12:50:00 CDT, Days 1  dexamethasone (for IVPB): 10 mg, IV Piggyback, Once-chemo, Infuse over: 20 minute(s), first " dose 19 9:40:00 CST, stop date 19 9:40:00 CST, Days 1  fluorouracil (for IVPB) -CCA: 3,750 mg, IV Piggyback, Once-chemo, Infuse over: 46 hr, first dose 19 14:00:00 CST, stop date 19 14:00:00 CST, Days 1  fluorouracil (for IVPB) -CCA: 625 mg, IV Piggyback, Once-chemo, Infuse over: 30 minute(s), first dose 19 11:30:00 CST, stop date 19 11:30:00 CST, Days 1  fosaprepitant (for IVPB): 150 mg, form: Powder-Inj, IV Piggyback, Once-chemo, Infuse over: 30 minute(s), first dose 19 9:40:00 CST, stop date 19 9:40:00 CST, Days 1  Future  heparin flush 100 units/mL (500 Unit  Flush): 500 units, form: Injection, IV Push, Once-chemo, *Est. first dose 20 10:28:00 CST, *Est. stop date 20 10:28:00 CST, Routine, Heparin Flush for Medi-port, Weeks 25, Future Order  heparin flush 100 units/mL (500 Unit  Flush): 500 units, form: Injection, IV Push, Once-chemo, first dose 20 15:00:00 CST, stop date 20 15:00:00 CST, Routine, Heparin Flush for Medi-port, Weeks 17, Future Order  Prescriptions  Prescribed  Lomotil 2.5 mg-0.025 mg oral tablet: 2 tab(s), Oral, q6hr, every 6 hours as needed till diarrhea is controlled, # 30 tab(s), 2 Refill(s), Pharmacy: [facility]/pharmacy #[id number]  Phenergan 25 mg Tab: 25 mg = 1 tab(s), Oral, q6hr, # 60 tab(s), 3 Refill(s), Pharmacy: [place] [facility] [address] [id number], Patient Education Provided, Patient Verbalizes Understanding  Zofran 8 mg oral tablet: See Instructions, 1 tab(s) Oral BID for 3 days after chemotherapy and q8hr PRN nausea, # 60 tab(s), 1 Refill(s), Pharmacy: [facility]/pharmacy #[id number]  Zofran ODT 8 mg oral tablet, disintegratin mg tabs, Oral, q6hr, PRN PRN nausea/vomiting, # 15 tab(s), 1 Refill(s), Pharmacy: [place] [address] [id number]  ZyPREXA 10 mg oral tablet: See Instructions, 1 tab(s) Oral morning of and Daily for 2 days after chemotherapy, # 18 tab(s), 0 Refill(s), Pharmacy: [facility]/pharmacy #[id number]  Documented Medications  Documented  Azithromycin 5 Day  Dose Pack 250 mg oral tablet: = 1 packet(s), Oral, Daily, as directed on package labeling, # 6 tab(s), 0 Refill(s)  Compazine: 0 Refill(s)  LISINOP/HCTZ TAB 10-12.5: 1 tab(s), Daily  Multivitamins and Minerals oral tablet: 0 Refill(s)  levothyroxine 25 mcg (0.025 mg) oral tablet: Oral   Problem list:    All Problems  Benign neoplasm of liver / SNOMED CT 0860260900 / Confirmed  Hypertension / ICD-9-.9 / Confirmed  Cancer of descending colon / SNOMED CT 368303216 / Confirmed  Morbid obesity / SNOMED CT 705108534 / Probable  Polyp of colon / SNOMED CT 830723363 / Confirmed  Potential for infection / SNOMED CT 228208426 / Confirmed  Review of care plan / SNOMED CT 7273175740 / Confirmed  Review of care plan / SNOMED CT 2948961818 / Confirmed  Liver metastases / SNOMED CT 248517010 / Confirmed  Ovarian metastasis / SNOMED CT 434933017 / Confirmed  Valvular heart disease / SNOMED CT 5359112 / Confirmed  Inactive: Alteration in nutrition / SNOMED CT 151563425  Problem automatically updated based on documentation on Nutritional Risk Factors or  Unintentional Weight Loss.  Inactive: At risk for aspiration / SNOMED CT 4921958632  Problem automatically updated based on documentation on Cough, Oxygen Therapy, Aspiration Risk, CN IX, X Swallowing Gag Reflex, GI Symptoms, Nutrition Risk Factors, and/or Enteral Tube Type.  Inactive: At risk for falls / SNOMED CT 845303138  Problem automatically updated based on documentation on History of Falls, History of Falls in last 3 months Florentino, Florentino Fall Risk Score and/or ADLs.  Inactive: At risk for nutritional problem / SNOMED CT 595880572  Problem automatically updated based on documentation on Nutritional Risk Factors or  Unintentional Weight Loss.  Inactive: At risk of healthcare associated infection / SNOMED CT 0752174661  Problem automatically updated based on Central IV Access Type, Arterial Line Site, PA Catheter Type, Pacemaker Type.  Inactive: At risk of pressure sore /  SNOMED CT 449717955  Inactive: Bowel dysfunction / SNOMED CT 879252553  Problem added automatically by system based on documentation of Bowel Sounds as Hyperactive, Hyperactive, or Absent;  GI Symptoms as Hyperactive, Constipation, or Diarrhea and/or Passing Flatus as No.  Problem added automatically by system based on documentation of Bowel Sounds as Hyperactive, Hyperactive, or Absent;  GI Symptoms as Hyperactive, Constipation, or Diarrhea and/or Passing Flatus as No.  Inactive: Disorder of fluid balance / SNOMED CT 085043349  Problem automatically updated based on documentation on Edema, Anasarca, Edema, Generalized, Edema, Bilateral Periorbital, Edema, Face, Edema,, Bilateral Arm, Edema, Left Arm, Edema, Right Arm, Edema, Bilateral Hand, Edema, Left Hand, Edema, Right Hand, Edema, Labia, Edema, Scrotum, Edema, Penile, Edema, Bilateral Upper Leg, Edema, Bilateral Lower Leg, Edema, Bilateral Pretibial, Edema, Bilateral Ankle, Edema, Bilateral Pedal, GI Symptoms, Skin Turgor General, and/or Gestational Age Method.  Resolved: Acid reflux / SNOMED CT 741426810  Resolved: Alteration in patterns of urinary elimination / SNOMED CT 95485488  Problem automatically updated based on documentation on Genitourinary Symptoms and/or Urinary Elimination.  Resolved: At risk of UTI / SNOMED CT 565976481  Problem automatically updated based on documentation on Urinary Elimination.  Resolved: CA - cancer of colon / SNOMED CT [id number]  Resolved: Chemotherapy / SNOMED CT 265250895  Resolved: Heartburn / SNOMED CT [id number]  Resolved: Osteoarthritis / SNOMED CT 3426044003  Resolved: Pregnant / SNOMED CT 745518111  Resolved: Pregnant / SNOMED CT 578183063  Resolved: Pregnant / SNOMED CT 084754862  Resolved: Sensitive to cold / SNOMED CT 6167241047  Resolved: Sensitive to heat / SNOMED CT 5720012458  Resolved: Stress incontinence / ICD-9-.6  Resolved: Umbilical hernia /  ICD-9-.1  Canceled: Radiation therapy care / SNOMED CT 3291774683,    Active Problems (11)  Benign neoplasm of liver   Cancer of descending colon   Hypertension   Liver metastases   Morbid obesity   Ovarian metastasis   Polyp of colon   Potential for infection   Review of care plan   Review of care plan   Valvular heart disease         Physical Examination   Vital Signs   12/30/2019 8:50 CST      Temperature Oral          37 DegC                             Temperature Oral (calculated)             98.60 DegF                             Peripheral Pulse Rate     77 bpm                             SpO2                      99 %                             Oxygen Therapy            Room air                             Systolic Blood Pressure   121 mmHg                             Diastolic Blood Pressure  63 mmHg     Measurements from flowsheet : Measurements   12/30/2019 8:50 CST      Weight Dosing             88.1 kg                             Weight Measured           88.1 kg                             Weight Measured and Calculated in Lbs     194.23 lb                             Height/Length Dosing      152 cm                             Height/Length Measured    152 cm                             BSA Measured              1.93 m2                             Body Mass Index Measured  38.13 kg/m2     General:  Alert and oriented.         Ambulation status: Assistive devices ( Cane ).         Appearance: Overweight.         Skin: No clubbing, No edema, Not jaundiced.    Eye:  Pupils are equal, round and reactive to light, Normal conjunctiva, Vision unchanged.    HENT:  Normocephalic, Oral mucosa is moist.    Neck:  Supple, Non-tender, No lymphadenopathy.    Respiratory:  Respirations are non-labored, Breath sounds are equal, Symmetrical chest wall expansion, diminished/ wet breath sounds to bilatera lower lobes.         Cough: Productive.    Cardiovascular:  Normal rate, Regular rhythm, No edema.     Gastrointestinal:  Soft, Non-tender, Non-distended, Normal bowel sounds, Midline surgical scar   .    Genitourinary:  No costovertebral angle tenderness.    Musculoskeletal:       Mobility/ gait: Able to walk with minimal assistance, Able to walk with a cane,    .         Lower extremity exam: Arthritic changes to bilateral knees. .    Integumentary:  Warm, Dry, Intact.    Neurologic:  Alert, Oriented, No focal deficits.    Cognition and Speech:  Speech clear and coherent.    Psychiatric:  Cooperative, Appropriate mood & affect.    ECOG Performance Scale: 1- Strenuous physical activity restricted; fully ambulatory and able to carry out light work.      Review / Management   Results review:  Lab results   12/30/2019 8:54 CST      POC TCO2                  23.0 mmol/L  LOW                             POC Hb                    12.2 mg/dL                             POC Hct                   36.0 %  LOW                             POC Sodium                137 mmol/L  LOW                             POC Potassium             4.4 mmol/L                             POC Chloride              108 mmol/L                             POC Ion Calcium           1.15 mmol/L                             POC Glucose               102 mg/dL                             POC BUN                   21.0 mg/dL                             POC Creatinine            1.1 mg/dL                             POC AGAP                  12.0  NA    12/30/2019 8:41 CST      WBC                       4.6 x10(3)/mcL                             RBC                       4.01 x10(6)/mcL  LOW                             Hgb                       12.1 gm/dL                             Hct                       38.4 %                             Platelet                  346 x10(3)/mcL                             MCV                       95.8 fL  HI                             MCH                       30.2 pg                             MCHC                       31.5 gm/dL  LOW                             RDW                       16.5 %                             MPV                       8.5 fL  LOW                             Abs Neut                  1.70 x10(3)/mcL  LOW                             NEUT%                     37.2 %  NA                             NEUT#                     1.7 x10(3)/mcL  LOW                             LYMPH%                    40.0 %  NA                             LYMPH#                    1.8 x10(3)/mcL                             MONO%                     16.2 %  NA                             MONO#                     0.7 x10(3)/mcL                             EOS%                      4.8 %  NA                             EOS#                      0.2 x10(3)/mcL                             BASO%                     0.7 %  NA                             BASO#                     0.0 x10(3)/mcL  .       Impression and Plan   Diagnosis     1. 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 Patient had recurrence/mets to ovaries in 2011. See note below for more details.  KRAS mutation +  2. Recurrent metastatic dz with  dz noted in the retroperitoneum with involvement of the proximal and distal ureter status post exploratory laparotomy with resection and lysis of adhesions and primary anastomosis of left ureter performed February 22nd 2018 by Dr. Gary Pugh, along with liver metastases and mesenteric adenopathy.  3. Left hydroureteronephrosis s/p JJ stent placement during hospitalization in August 2017.   4. Treatment induced diarrhea-imrpoved since stopping chemo.   5. Repeat CT A/P on 11/1/19 with noted increase in liver lesion; restaging chest CT on 11/26/2019 showed worsening disease in her liver, no new disease  in her chest..     Plan:  Will HOLD Cycle # 25 of treatment planned for tomorrow s/t active infection  Continue antibiotics as prescribed   Continue PRN antidiarrheals as needed   Rx for antitussive sent to pharmacy   RTC in 1 week for TD visit prior to rescheduled Cycle #25   CBC, BMP same day lab   Call with questions or concerns     Cora CARDONA, STACEYP-C.

## 2022-04-30 NOTE — PROGRESS NOTES
Patient:   Terri Penny            MRN: [id number]            FIN: [id number]               Age:   64 years     Sex:  Female     :  1955   Associated Diagnoses:   None   Author:   Clay ORTIZ MD, Desmond GAMBLE      Visit Information   PROBLEM LIST  1. 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 Patient had recurrence/mets to ovaries in . See note below for more details.  KRAS mutation +  2. Recurrent metastatic dz with  dz noted in the retroperitoneum with involvement of the proximal and distal ureter status post exploratory laparotomy with resection and lysis of adhesions and primary anastomosis of left ureter performed 2018 by Dr. Vega and Dr. Pugh.  3. Liver metastases along with mesenteric LAD.   4. Left hydroureteronephrosis s/p JJ stent placement during hospitalization in 2017.   5. Treatment induced diarrhea.  Much improved since stopping chemo.         CURRENT THERAPY:  1.  Started FOLFOX and bevacizumab on 3/24/2020.    TREATMENT HISTORY:  1.  Adjuvant XELIRI + Bevacizumab initiated 11, completed 10/2011  2.  FLOX chemotherapy 3/17/10, completed 8/11/10  3.  FOLFIRI plus Cyramza started 4-18 given every 3 weeks for tolerance.  Completed 23 cycles as of 2019.  Treatment was then discontinued based on worsening side effects and patient was given a treatment break.  4.  FOLFIRI plus Cyramza reinitiated cycle 24 on 12/10/2019 due to progressive disease in her liver -- Due for Cycle #28 on 3/10/2020 however discontinued due to disease progression.      HISTORY:  Terri Penny presented to the emergency room in [facility] 2/12/10, with an obstructed colon. A colonoscopy by Dr. Brambila revealed an obstructing friable lesion at  50cm in the region of the splenic flexure and descending colon. The scope was not able to be passed beyond the obstructing lesion. Dr. Zaragoza performed a colectomy with ileostomy placement as well as a cholecystectomy and appendectomy. Pathology revealed infiltrating moderately differentiated mucinous adenocarcinoma with tumor extension through all layers of the bowel wall and into the pericolonic fat, free surgical margins, and 0 of 46 lymph nodes. Also, acute appendicitis with perforation and a periappendiceal abscess as well as acute cholecystitis were found. The patient initiated adjuvant FLOX chemotherapy 3/17/10 and completed treatment on 8/11/10. Her ileostomy was reversed 9/22/10. At her 2/10/11 follow-up visit the CEA had increased from 1/8 to 3.5. The CEA was repeated one month later found to have increased further to 4.5. She was referred for CT scans which revealed a 10.6 x 6.5 cm left adnexal mass. Mammogram 10/25/10 was benign. MRI of the abdomen 11/8/10 indicated an indeterminate lesion post segment of the right hepatic lobe thought to be a cyst was stable compared to prior scan. CXR was clear.    Because of the possibility of a new ovarian primary versus metastatic colon carcinoma revealed in the CT, she underwent resection by Dr. 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 She was initiated on adjuvant Bevacizumab, Irinotecan, and Xeloda 5/18/11. She did not experience diarrhea but did have several days of nausea that was well controlled with Zofran. No palmar/plantar complaints. Patient completed 8 courses of treatment on 10/14/11. Tumor was found positive for KRAS mutation and unresponsive to EGFR inhibitor agents.    CT abdomen/pelvis 8/29/11 revealed no new metastatic lesion. There were 2 lesions in the liver which were present in the 2/2010 films that were very slightly enlarged.    PET/CT 11/1/11 showed no  metastasis. She did have vague uptake in the terminal ileum related to the ileitis for which she was admitted to [facility] 10/29/11 and resolved.    PET/CT 2/9/2012 showed no definite residual malignancy or metastatic disease; new mildly increased patchy FDG uptake in the L-hepatic lobe indeterminate with no corresponding CT lesion. US was diagnostic of a hemangioma.    She underwent colonoscopy 6/22/2012 which revealed 2 small adenomatous polyps with no dysplasia. Dr. Vences recommended colonoscopy in one year.    PET/CT 2/2012: no evidence of disease.  PET/CT 8/27/12: no evidence of metastatic disease with incidental cystic structure in the left hemipelvis noted on prior exams.  PET/CT 12/5/12: low density cystic mass at left anterior adnexa, probable lymphocele no increased FDG uptake. No evidence of metastatic disease.  PET/CT 3/13: no evidence of disease. CEA=2.3  CT scans 9/26/2013 with no acute findings suspicious for recurrent disease. Stable simple-appearing cystic lesion in the left hemipelvis has not changed in size measuring 7 x 4.8 cm possibly representing postoperative seroma. CEA=2.4  Repeat PET/CT 1/10/14 (following rise in CEA to 4.0) showed LENNY.   CT scan of the abdomen 04/03/2014 secondary to abdominal pain showed partial small bowel obstruction  CT 7/16/14 of C/A/P:  no evidence of new metastases.  Post operative large seroma noted measuring 19 cm, along with left pelvic fluid collection.   CT scans done January 13, 2015 show approximately 1.4 cm hypodense lesion in the posterior portion of hepatic segment 7 which is more conspicuous on the current study but unchanged in size from the prior exam.  However, no comment was made of this lesion from the radiologist's report from the prior scans in July 2014.    CEA = 4.0 with prior CEA of 3.0  Restaging CT scans done 7/14/15--> LENNY. Stable benign right posterior liver lesion measuring 1.4 cm.  CEA WNL  repeat CEA from October 2015 = 4.  Repeat CT C/A/P  done 02/03/16 LENNY--->02/03/16 CEA =4  Colonoscopy done July 19, 2016 was unremarkable.  CT scans of the chest abdomen pelvis done August 5, 2016 show slightly larger fluid collection anterior abdomen with stable hypodensity posterior aspect of the right hepatic lobe.  No new lesions.   CEA from August 5, 2016 = 6  Repeat CEA from November 1, 2016 = 7, increased slightly from August.  Repeat CT scans on February 2, 2017 shows stable posterior right hepatic lobe density measuring 1.2 cm, with stable subcutaneous fluid collection, and stable left adnexal cystic lesion.  CEA = 8  Repeat CEA from May 5, 2017 stable = 8  Patient was admitted August 6, 2017 after presenting with abdominal pain that began the day before with subsequent  development of vomiting episodes.    CT scan done August 6, 2017 at the time of presentation revealed dilated loops of small bowel likely consistent with ileus or partial obstruction.  Redemonstration of moderate left hydroureteronephrosis with obstruction at the level of the pelvic inlet adjacent to the previously described soft tissue density and cystic lesion.  The patient had undergone restaging CT scans 2 days prior in August 4, 2017 as part of her routine follow-up visit with me which revealed 4 mm nodule along the right minor fissure likely benign etiology--with stable findings compared to prior scans.  Within the abdomen and pelvis, moderate left hydronephrosis with left ureter obstructed at the level of the pelvic inlet adjacent to a 5.1 x 6.4 cm cyst along with a 3.0 x 2.1 cm soft tissue density respectively.  Radiologist comments that the soft tissue density had measured 2.8 x 2.1 cm on prior scan from February 27, 2016.  However, when looking back at the radiology report from February 2016, no mention was made of such a lesion.  Urology and surgery were consulted.  Urology placed a JJ stent on the left and Dr. Zaragoza is a general surgery opted for conservative management, with  subsequent resolution of her symptoms.  At the time of this consultation August 10, 2017, she is moving her bowels and eating well without difficulty with plan to discharge home and follow-up with Dr. Vega for potential biopsy.  She then underwent CT-guided biopsy of this lesion at [facility] ordered by Dr. Vega on August 29, 2017.  Pathology showed abdominal seroma with scattered lymphocytes and fibrin and no evidence of malignancy.  CEA from 11/14/17 = 11 (previously 8).   CT of C/A/P on 11/14 shows similar size of LLQ cystic and solid soft tissue lesions.   New/additional prominent mesenteric LN measuring 11 mm.    Improved hydronephrosis on the left.    PET/CT done November 28, 2017 shows previously described mesenteric lymph node with stable size without any appreciable FDG activity but with 2 cm focus of hypermetabolism at nearby segment of small salud with SUV uptake of l 5.4.  Additional 3 cm mass in the left lower quadrant which is hypermetabolic with SUV uptake of 11.6.  Additional 6 cm left lower quadrant cystic mass with no areas of hypermetabolism.  Patient was then referred back to Dr. Mihir Vega after her visit in late November, and then underwent exploratory laparotomy on femora 22nd 2018. She underwent extensive lysis of adhesions with resection of a retroperitoneal mass along with additional small bowel resection, as well as resection and primary anastomosis of left ureter. Pathology revealed metastatic adenocarcinoma involving the proximal and distal ureter, as well as metastatic adenocarcinoma from the primary retroperitoneal mass in addition to additional pieces of fibroadipose tissue also involved by disease.  The patient went home on February 26, 2018 and initially did well. She was readmitted to the hospital on February 28, 2018 secondary to worsening diarrhea and concern over possible obstruction. CT scans of the abdomen and pelvis were done which demonstrated multiple new  hepatic mass lesions concerning for metastases. Postsurgical changes noted with small bowel loops suggestive of postoperative ileus.  The patient was managed conservatively/expectantly and discharged home March 5, 2018 in stable condition.  She was then set up to come back to see me March 8, 2018 in clinic.  Restaging CT chest done 3/20/18 showed No evidence of mets.    CEA =25.  Iron studies c/w iron deficiency.   Patient was then started on FOLFIRI as noted above with Cyramza added with cycle 3.    Restaging CT scans of chest/abdomen/pelvis performed 7/20/18 noted persistent hepatice metastases which appear stable when compared to prior examination, postsurgical changes from prior left hemicolectomy with no evidence of residual or recurrent disease, seruma noted within the anterior soft tissues; corresponding CEA 8.0  Repeat CEA valentin 9/18/18 --4.9  Repeat CEA on 9/25/18--5.0  Repeat CEA on 10/17/18 --4.9  Restaging CT scans C/A/P done 12/3/18:   stable hypodense liver lesions noted, all uchanged, with the largest measuring 1.4 cm in segment 7, with no new or enlarging lesions elsewhere.    CEA = 4.9  Repeat CEA on 12/26 noted at 4.5  Repeat CEA on 1/17/19 noted at 4.6.   CEA on 2/5/19 = 5.1   Repeat CEA on 3/4/19 noted at 6.4   Restaging CT scans done March 22, 2019 with CT of the chest abdomen pelvis showed stable scattered hypodensities in the liver with stability in size and configuration compared to prior exam.  Largest in the posterior right lobe measuring 13 mm without change.  Small stable subcutaneous fluid collection over the anterior pelvis without significant changes.  No new lesions seen.  CEA  4/29/19 = 5.5  CEA on 5/20/19 = 5.2  Repeat CEA from 7/3/2019 = 5.6.   We will plan for restaging scans after cycle 23 to be done later in July.  Repeat CT C/A/P on 7/26/19 showed stable liver lesion to posterior right lobe, persistent fluid collecition in the subcutaneous tissue overlying the pelvis, unchanged;  no evidence of new metastatic disease. CEA noted at 6.9  She completed 23 cycles of treatment July 9, 2019 and then was put on treatment holiday as noted above following restaging scans that showed overall stability  Repeat CEA done 9/6/2019 = 5.3.  Previously 8.0.  Repeat CEA on 10/18/19 noted at 6.5   Patient [facility] at [facility] ED on 11/1/19 s/t abdominal pain/nausea/vomiting. Admitted for concern for small bowel obstruction. CT A/P obtained showed new/progressive hepatic metastases increased in size/number when compared to previous; enhancing 1.7cm nodule within the distal left ureter; findings concerning with small bowel obstructions/t mild enteritis and possible adhesions.  Seen by surgery for consideration of surgical repair however recommendations made for conservative treatment.   CEA on 11/15/19 noted at 14.6     CT of the thorax on 11/26/2019 showed no evidence of new progressive metastatic disease in the chest, however multiple hepatic metastases are mildly increased in size.  CEA was up to 19.9 on that same day.  Plan on reinitiating FOLFIRI and Cyramza on 12/10/2019.    CEA on 2/17/2020 was 27.2.  Restaging CT scans on 3/4/2020 showed no evidence of metastatic intrathoracic disease.  There was numerous metastatic liver lesions that are slightly larger and more apparent in the interval.  This suggest worsening disease burden.  The scattered mildly prominent abdominopelvic lymph nodes are unchanged in the interval with no new focal lesions or adenopathy identified.    CT-guided liver biopsy done on 3/18/2020 revealed metastatic colorectal adenocarcinoma, moderately differentiated.  Caris from that biopsy specimen showed a KRAS mutation, MSI was stable, tumor mutational burden was intermediate with 7 mutations per megabase, BRAF negative, PTEN positive.  This suggested increase benefit to FOLFOX plus bevacizumab.    Past medical history: Colon cancer with ovarian metastases, benign liver lesions, HTN, stress  incontinence, osteoarthritis, GERD, umbilical hernia, small bowel obstruction secondary to hernia.  Procedure history: Incisional hernia repair 6/4/14, knee arthroplasty, colon resection, appendectomy, cholecystectomy, hysterectomy, colonoscopy, ileostomy and removal.   Auditory laparotomy February 22, 2018 as described above  Family history: Motherbreast cancer, fatherskin cancer  Social history: Patient lives at home with spouse. Retired. Regular diet, no exercise. Denies substance/tobacco use, never smoker.         Chief Complaint    Follow Up       4/22/2020 8:42 CDT       patient stated that she still has diarrhea and feeling nauseous. patient feeling SOB when she starts walking    4/22/2020 8:41 CDT       patient stated that she still has diarrhea and feeling nauseous. patient feeling SOB when she starts walking        Interval History   Patient here for scheduled follow up and for cycle 3 of FOLFOX with bevacizumab.  She states that she has had bad nausea, vomiting, and diarrhea.  This is all still happening even though she is taking medications for it.  This is really caused her to feel very weak.  She feels like she would not tolerate this cycle of treatment.  She denies any fevers, shortness of breath, cough.      Review of Systems   14 point review systems done full with pertinent positives as described in interval history. Otherwise reviewed and unremarkable.      Health Status   Allergies:    Allergic Reactions (Selected)  Severity Not Documented  Percocet 2.5/325- Nausea.,    Allergies (1) Active Reaction  Percocet 2.5/325 nausea     Current medications:  (Selected)   Outpatient Medications  Ordered  Aloxi (for IVPB): 0.25 mg, IV Piggyback, Once-chemo, Infuse over: 0.3 hr, first dose 01/02/19 9:40:00 CST, stop date 01/02/19 9:40:00 CST, Days 1  Benadryl 50mg/ml Inj: 25 mg, IV Piggyback, Once-chemo, Infuse over: 20 minute(s), first dose 01/02/19 9:40:00 CST, stop date 01/02/19 9:40:00 CST, Routine,  Days 1  CCA Atropine 0.4 mg/mL PF Injection: 0.25 mg, IV Piggyback, Once-chemo, Infuse over: 20 minute(s), first dose 19 9:40:00 CST, stop date 19 9:40:00 CST, Days 1  Camptosar (for IVPB): 250 mg, IV Piggyback, Once-chemo, Infuse over: 1.5 hr, first dose 19 10:00:00 CST, stop date 19 10:00:00 CST, Days 1  Fusilev (for IVPB): 300 mg, IV Piggyback, Once-chemo, Infuse over: 1.5 hr, first dose 19 10:00:00 CST, stop date 19 10:00:00 CST, Days 1  Sodium Chloride 0.9% 1000mL: form: Infusion, IV Piggyback, Once-chemo, Infuse over: 2 hr, first dose 18 12:50:00 CDT, stop date 18 12:50:00 CDT, Days 1  dexamethasone (for IVPB): 10 mg, IV Piggyback, Once-chemo, Infuse over: 20 minute(s), first dose 19 9:40:00 CST, stop date 19 9:40:00 CST, Days 1  fluorouracil (for IVPB) -CCA: 3,750 mg, IV Piggyback, Once-chemo, Infuse over: 46 hr, first dose 19 14:00:00 CST, stop date 19 14:00:00 CST, Days 1  fluorouracil (for IVPB) -CCA: 625 mg, IV Piggyback, Once-chemo, Infuse over: 30 minute(s), first dose 19 11:30:00 CST, stop date 19 11:30:00 CST, Days 1  fosaprepitant (for IVPB): 150 mg, form: Powder-Inj, IV Piggyback, Once-chemo, Infuse over: 30 minute(s), first dose 19 9:40:00 CST, stop date 19 9:40:00 CST, Days 1  Prescriptions  Prescribed  Lomotil 2.5 mg-0.025 mg oral tablet: 2 tab(s), Oral, q6hr, every 6 hours as needed till diarrhea is controlled, # 30 tab(s), 2 Refill(s), Pharmacy: [facility]/pharmacy #[id number], 152, cm, Height/Length Dosing, 20 8:42:00 CDT, 82.9, kg, Weight Dosing, 20 8:42:00 CDT  Phenergan 25 mg Tab: 25 mg = 1 tab(s), Oral, q6hr, # 60 tab(s), 3 Refill(s), Pharmacy: [place] [address] [id number], Patient Education Provided, Patient Verbalizes Understanding  Zofran ODT 4 mg oral tablet, disintegratin mg = 1 tab(s), Oral, TID, PRN PRN nausea, # 10 tab(s), 0 Refill(s), Pharmacy: [facility]/pharmacy #[id number], 152, cm, Height/Length  Dosing, 20 11:33:00 CDT, 87.4, kg, Weight Dosing, 20 11:33:00 CDT  Zofran ODT 8 mg oral tablet, disintegratin mg tabs, Oral, q6hr, PRN PRN nausea/vomiting, # 15 tab(s), 1 Refill(s), [id number], 152, cm, Height/Length Dosing, 20 9:37:00 CDT, 93.1, kg, Weight Dosing, 20 9:37:00 CDT  dicyclomine 20 mg oral tablet: 20 mg = 1 tab(s), Oral, QID, # 28 tab(s), 0 Refill(s), Pharmacy: [facility]/pharmacy #[id number], 152, cm, Height/Length Dosing, 20 11:33:00 CDT, 87.4, kg, Weight Dosing, 20 11:33:00 CDT  olanzapine 10 mg oral tablet: See Instructions, 1 tab(s) Oral morning of chemo and daily for 2 days following chemo, # 18 tab(s), 0 Refill(s), Pharmacy: [facility]/pharmacy #[id number], 152, cm, Height/Length Dosing, 20 8:20:00 CST, 90.8, kg, Weight Dosing, 20 8:20:00 CST  ondansetron 8 mg oral tablet: 8 mg = 1 tab(s), Oral, QID, # 120 tab(s), 0 Refill(s), [id number], 152, cm, Height/Length Dosing, 20 8:42:00 CDT, 82.9, kg, Weight Dosing, 20 8:42:00 CDT  Documented Medications  Documented  Compazine: 0 Refill(s)  LISINOP/HCTZ TAB 10-12.5: 1 tab(s), Oral, Daily  Multivitamins and Minerals oral tablet: 1 tab, Oral, Daily, 0 Refill(s)  levothyroxine 25 mcg (0.025 mg) oral tablet: 1 tab, Oral, Daily, on empty stomach   Problem list:    All Problems  Benign neoplasm of liver / SNOMED CT 2809606279 / Confirmed  Colon cancer screening / SNOMED CT 1935518598 / Confirmed  GERD - Gastro-esophageal reflux disease / SNOMED CT 5828892233 / Confirmed  Hypertension / ICD-9-.9 / Confirmed  Cancer of descending colon / SNOMED CT 627708350 / Confirmed  Morbid obesity / SNOMED CT 150735957 / Probable  Ovarian cancer / SNOMED CT 6955190119 / Confirmed  Polyp of colon / SNOMED CT 480565854 / Confirmed  Potential for infection / SNOMED CT 350939927 / Confirmed  Review of care plan / SNOMED CT 4835948334 / Confirmed  Review of care plan / SNOMED CT 8103427258 /  Confirmed  Liver metastases / SNOMED CT 989560462 / Confirmed  Ovarian metastasis / SNOMED CT 599300847 / Confirmed  Valvular heart disease / SNOMED CT 5353573 / Confirmed  Inactive: Alteration in nutrition / SNOMED CT 568090933  Problem automatically updated based on documentation on Nutritional Risk Factors or  Unintentional Weight Loss.  Inactive: At risk for aspiration / SNOMED CT 8353513284  Problem automatically updated based on documentation on Cough, Oxygen Therapy, Aspiration Risk, CN IX, X Swallowing Gag Reflex, GI Symptoms, Nutrition Risk Factors, and/or Enteral Tube Type.  Inactive: At risk for falls / SNOMED CT 235853368  Problem automatically updated based on documentation on History of Falls, History of Falls in last 3 months Florentino, Florentino Fall Risk Score and/or ADLs.  Inactive: At risk for nutritional problem / SNOMED CT 808846141  Problem automatically updated based on documentation on Nutritional Risk Factors or  Unintentional Weight Loss.  Inactive: At risk of healthcare associated infection / SNOMED CT 5543405713  Problem automatically updated based on Central IV Access Type, Arterial Line Site, PA Catheter Type, Pacemaker Type.  Inactive: At risk of pressure sore / SNOMED CT 663604249  [facility]: [facility]  Problem added automatically by system based on documentation of Bowel Sounds as Hyperactive, Hyperactive, or Absent;  GI Symptoms as Hyperactive, Constipation, or Diarrhea and/or Passing Flatus as No.  Problem added automatically by system based on documentation of Bowel Sounds as Hyperactive, Hyperactive, or Absent;  GI Symptoms as Hyperactive, Constipation, or Diarrhea and/or Passing Flatus as No.  Inactive: Disorder of fluid balance / SNOMED CT 538745967  Problem automatically updated based on documentation on Edema, Anasarca, Edema, Generalized, Edema, Bilateral Periorbital, Edema, Face, Edema,, Bilateral Arm, Edema, Left Arm, Edema, Right Arm, Edema, Bilateral  Hand, Edema, Left Hand, Edema, Right Hand, Edema, Labia, Edema, Scrotum, Edema, Penile, Edema, Bilateral Upper Leg, Edema, Bilateral Lower Leg, Edema, Bilateral Pretibial, Edema, Bilateral Ankle, Edema, Bilateral Pedal, GI Symptoms, Skin Turgor General, and/or Gestational Age Method.  Resolved: Acid reflux / SNOMED CT 639449468  Resolved: Alteration in patterns of urinary elimination / SNOMED CT 40333863  Problem automatically updated based on documentation on Genitourinary Symptoms and/or Urinary Elimination.  Resolved: At risk of UTI / SNOMED CT 332953875  Problem automatically updated based on documentation on Urinary Elimination.  Resolved: CA - cancer of colon / SNOMED CT [id number]  Resolved: Chemotherapy / SNOMED CT 327188094  Resolved: Heartburn / SNOMED CT [id number]  Resolved: Osteoarthritis / SNOMED CT 1920267802  Resolved: Pregnant / SNOMED CT 066022765  Resolved: Pregnant / SNOMED CT 041611288  Resolved: Pregnant / SNOMED CT 357146620  Resolved: Sensitive to cold / SNOMED CT 0142190911  Resolved: Sensitive to heat / SNOMED CT 6765589575  Resolved: Stress incontinence / ICD-9-.6  Resolved: Umbilical hernia / ICD-9-.1  Canceled: Radiation therapy care / SNOMED CT 3086118030,    Active Problems (14)  Benign neoplasm of liver   Cancer of descending colon   Colon cancer screening   GERD - Gastro-esophageal reflux disease   Hypertension   Liver metastases   Morbid obesity   Ovarian cancer   Ovarian metastasis   Polyp of colon   Potential for infection   Review of care plan   Review of care plan   Valvular heart disease         Physical Examination   Vital Signs   4/22/2020 8:42 CDT       Temperature Oral          36.6 DegC                             Temperature Oral (calculated)             97.88 DegF                             Peripheral Pulse Rate     97 bpm                             Respiratory Rate          20 br/min                              SpO2                      99 %                             Oxygen Therapy            Room air                             Systolic Blood Pressure   117 mmHg                             Diastolic Blood Pressure  67 mmHg                             Blood Pressure Location   Left arm                             Manual Cuff BP            No                             O2 SAT at rest            99 %     Measurements from flowsheet : Measurements   4/22/2020 8:42 CDT       Weight Dosing             82.9 kg                             Weight Measured           82.9 kg                             Weight Measured and Calculated in Lbs     182.76 lb                             Height/Length Dosing      152 cm                             Height/Length Measured    152 cm                             BSA Measured              1.87 m2                             Body Mass Index Measured  35.88 kg/m2     General:  Alert and oriented.         Ambulation status: Assistive devices ( Cane ).         Appearance: Overweight.         Skin: No clubbing, No edema, Not jaundiced.    Eye:  Pupils are equal, round and reactive to light, Normal conjunctiva, Vision unchanged.    HENT:  Normocephalic, Oral mucosa is moist.    Neck:  Supple, Non-tender, No lymphadenopathy.    Respiratory:  Respirations are non-labored, Breath sounds are equal, Symmetrical chest wall expansion.    Cardiovascular:  Normal rate, Regular rhythm, No edema.    Gastrointestinal:  Soft, Non-tender, Non-distended, Normal bowel sounds, Midline surgical scar   .    Genitourinary:  No costovertebral angle tenderness.    Musculoskeletal:       Mobility/ gait: Able to walk with a cane,    .    Integumentary:  Warm, Dry, Intact.    Neurologic:  Alert, Oriented, No focal deficits.    Cognition and Speech:  Speech clear and coherent.    Psychiatric:  Cooperative, Appropriate mood & affect.    ECOG Performance Scale: 1- Strenuous physical activity restricted; fully ambulatory  and able to carry out light work.      Review / Management   Results review:  Lab results   4/22/2020 8:25 CDT       WBC                       4.1 x10(3)/mcL  LOW                             RBC                       4.18 x10(6)/mcL  LOW                             Hgb                       13.4 gm/dL                             Hct                       40.4 %                             Platelet                  224 x10(3)/mcL                             MCV                       96.7 fL  HI                             MCH                       32.1 pg  HI                             MCHC                      33.2 gm/dL                             RDW                       14.0 %                             MPV                       8.9 fL  LOW                             Abs Neut                  1.15 x10(3)/mcL  LOW                             NEUT%                     28.1 %  NA                             NEUT#                     1.2 x10(3)/mcL  LOW                             LYMPH%                    46.7 %  NA                             LYMPH#                    1.9 x10(3)/mcL                             MONO%                     15.9 %  NA                             MONO#                     0.6 x10(3)/mcL                             EOS%                      1.5 %  NA                             EOS#                      0.1 x10(3)/mcL                             BASO%                     0.2 %  NA                             BASO#                     0.0 x10(3)/mcL  .       Impression and Plan   Diagnosis     1. 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 Patient had recurrence/mets to ovaries in 2011. See note below for more details.  KRAS mutation +  2. Recurrent metastatic dz with  dz noted in  the retroperitoneum with involvement of the proximal and distal ureter status post exploratory laparotomy with resection and lysis of adhesions and primary anastomosis of left ureter performed February 22nd 2018 by Dr. Gary Pugh, along with liver metastases and mesenteric adenopathy.  3. Left hydroureteronephrosis s/p JJ stent placement during hospitalization in August 2017.   4. Treatment induced diarrhea-imrpoved since stopping chemo.   5. Repeat CT A/P on 11/1/19 with noted increase in liver lesion; restaging chest CT on 11/26/2019 showed worsening disease in her liver, no new disease in her chest..     Plan:    -She started FOLFOX and bevacizumab, cycle 1 day 1 on 3/24/2020.    -We dose reduced her 5% across the board and added Emend with cycle 2, however the patient still is having significant nausea, vomiting, and diarrhea.    -We we will hold cycle 3 for 2 weeks and have her return to clinic in 2 weeks to discuss cycle 3 of treatment if she feels better.  We will decrease her dosing again.    -I have refilled her Lomotil and Zofran.    -We would plan to scan her after 4 cycles of FOLFOX and bevacizumab.      All questions were answered to the best of my ability and the patient understands the plan moving forward.    Desmond Williamson II, MD.

## 2022-04-30 NOTE — PROGRESS NOTES
Patient:   Terri Penny            MRN: [id number]            FIN: [id number]               Age:   63 years     Sex:  Female     :  1955   Associated Diagnoses:   None   Author:   Tila Branch NP      Visit Information   PROBLEM LIST  1. 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 Patient had recurrence/mets to ovaries in . See note below for more details.  KRAS mutation +  2. Recurrent metastatic dz with  dz noted in the retroperitoneum with involvement of the proximal and distal ureter status post exploratory laparotomy with resection and lysis of adhesions and primary anastomosis of left ureter performed 2018 by Dr. Vega and Dr. Pugh.  3. Liver metastases along with mesenteric LAD.   4. Left hydroureteronephrosis s/p JJ stent placement during hospitalization in 2017.   5. Treatment induced diarrhea.  Stable  following dose reductions.        CURRENT THERAPY:  FOLFIRI + Cyramza start date 18-- q 3 weeks (for tolerance)  Due for C#19  on 19-- continue 15% dose reduction of 5FU and levoleucovorin/25% dose  reduction of Irinotecan secondary to diarrhea.   Continue with IV hydration + Octreotide on Day 3 (will give Neulasta with this cycle)      TREATMENT HISTORY:  Adjuvant XELIRI + Bevacizumab initiated 11, completed 10/2011  FLOX chemotherapy 3/17/10, completed 8/11/10    HISTORY:  Terri Penny presented to the emergency room in [facility] 2/12/10, with an obstructed colon. A colonoscopy by Dr. Brambila revealed an obstructing friable lesion at 50cm in the region of the splenic flexure and descending colon. The scope was not able to be passed beyond the obstructing lesion. Dr. Zaragoza performed a colectomy with  ileostomy placement as well as a cholecystectomy and appendectomy. Pathology revealed infiltrating moderately differentiated mucinous adenocarcinoma with tumor extension through all layers of the bowel wall and into the pericolonic fat, free surgical margins, and 0 of 46 lymph nodes. Also, acute appendicitis with perforation and a periappendiceal abscess as well as acute cholecystitis were found. The patient initiated adjuvant FLOX chemotherapy 3/17/10 and completed treatment on 8/11/10. Her ileostomy was reversed 9/22/10. At her 2/10/11 follow-up visit the CEA had increased from 1/8 to 3.5. The CEA was repeated one month later found to have increased further to 4.5. She was referred for CT scans which revealed a 10.6 x 6.5 cm left adnexal mass. Mammogram 10/25/10 was benign. MRI of the abdomen 11/8/10 indicated an indeterminate lesion post segment of the right hepatic lobe thought to be a cyst was stable compared to prior scan. CXR was clear.    Because of the possibility of a new ovarian primary versus metastatic colon carcinoma revealed in the CT, she underwent resection by Dr. 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 She was initiated on adjuvant Bevacizumab, Irinotecan, and Xeloda 5/18/11. She did not experience diarrhea but did have several days of nausea that was well controlled with Zofran. No palmar/plantar complaints. Patient completed 8 courses of treatment on 10/14/11. Tumor was found positive for KRAS mutation and unresponsive to EGFR inhibitor agents.    CT abdomen/pelvis 8/29/11 revealed no new metastatic lesion. There were 2 lesions in the liver which were present in the 2/2010 films that were very slightly enlarged.    PET/CT 11/1/11 showed no metastasis. She did have vague uptake in the terminal ileum related to the ileitis for which she was admitted to [facility] 10/29/11 and resolved.    PET/CT 2/9/2012 showed no  definite residual malignancy or metastatic disease; new mildly increased patchy FDG uptake in the L-hepatic lobe indeterminate with no corresponding CT lesion. US was diagnostic of a hemangioma.    She underwent colonoscopy 6/22/2012 which revealed 2 small adenomatous polyps with no dysplasia. Dr. Vences recommended colonoscopy in one year.    PET/CT 2/2012: no evidence of disease.  PET/CT 8/27/12: no evidence of metastatic disease with incidental cystic structure in the left hemipelvis noted on prior exams.  PET/CT 12/5/12: low density cystic mass at left anterior adnexa, probable lymphocele no increased FDG uptake. No evidence of metastatic disease.  PET/CT 3/13: no evidence of disease. CEA=2.3  CT scans 9/26/2013 with no acute findings suspicious for recurrent disease. Stable simple-appearing cystic lesion in the left hemipelvis has not changed in size measuring 7 x 4.8 cm possibly representing postoperative seroma. CEA=2.4  Repeat PET/CT 1/10/14 (following rise in CEA to 4.0) showed LENNY.   CT scan of the abdomen 04/03/2014 secondary to abdominal pain showed partial small bowel obstruction  CT 7/16/14 of C/A/P:  no evidence of new metastases.  Post operative large seroma noted measuring 19 cm, along with left pelvic fluid collection.   CT scans done January 13, 2015 show approximately 1.4 cm hypodense lesion in the posterior portion of hepatic segment 7 which is more conspicuous on the current study but unchanged in size from the prior exam.  However, no comment was made of this lesion from the radiologist's report from the prior scans in July 2014.    CEA = 4.0 with prior CEA of 3.0  Restaging CT scans done 7/14/15--> LENNY. Stable benign right posterior liver lesion measuring 1.4 cm.  CEA WNL  repeat CEA from October 2015 = 4.  Repeat CT C/A/P done 02/03/16 LENNY--->02/03/16 CEA =4  Colonoscopy done July 19, 2016 was unremarkable.  CT scans of the chest abdomen pelvis done August 5, 2016 show slightly larger fluid  collection anterior abdomen with stable hypodensity posterior aspect of the right hepatic lobe.  No new lesions.   CEA from August 5, 2016 = 6  Repeat CEA from November 1, 2016 = 7, increased slightly from August.  Repeat CT scans on February 2, 2017 shows stable posterior right hepatic lobe density measuring 1.2 cm, with stable subcutaneous fluid collection, and stable left adnexal cystic lesion.  CEA = 8  Repeat CEA from May 5, 2017 stable = 8  Patient was admitted August 6, 2017 after presenting with abdominal pain that began the day before with subsequent  development of vomiting episodes.    CT scan done August 6, 2017 at the time of presentation revealed dilated loops of small bowel likely consistent with ileus or partial obstruction.  Redemonstration of moderate left hydroureteronephrosis with obstruction at the level of the pelvic inlet adjacent to the previously described soft tissue density and cystic lesion.  The patient had undergone restaging CT scans 2 days prior in August 4, 2017 as part of her routine follow-up visit with me which revealed 4 mm nodule along the right minor fissure likely benign etiology--with stable findings compared to prior scans.  Within the abdomen and pelvis, moderate left hydronephrosis with left ureter obstructed at the level of the pelvic inlet adjacent to a 5.1 x 6.4 cm cyst along with a 3.0 x 2.1 cm soft tissue density respectively.  Radiologist comments that the soft tissue density had measured 2.8 x 2.1 cm on prior scan from February 27, 2016.  However, when looking back at the radiology report from February 2016, no mention was made of such a lesion.  Urology and surgery were consulted.  Urology placed a JJ stent on the left and Dr. Zaragoza is a general surgery opted for conservative management, with subsequent resolution of her symptoms.  At the time of this consultation August 10, 2017, she is moving her bowels and eating well without difficulty with plan to discharge  home and follow-up with Dr. Vega for potential biopsy.  She then underwent CT-guided biopsy of this lesion at [facility] ordered by Dr. Vega on August 29, 2017.  Pathology showed abdominal seroma with scattered lymphocytes and fibrin and no evidence of malignancy.  CEA from 11/14/17 = 11 (previously 8).   CT of C/A/P on 11/14 shows similar size of LLQ cystic and solid soft tissue lesions.   New/additional prominent mesenteric LN measuring 11 mm.    Improved hydronephrosis on the left.    PET/CT done November 28, 2017 shows previously described mesenteric lymph node with stable size without any appreciable FDG activity but with 2 cm focus of hypermetabolism at nearby segment of small salud with SUV uptake of l 5.4.  Additional 3 cm mass in the left lower quadrant which is hypermetabolic with SUV uptake of 11.6.  Additional 6 cm left lower quadrant cystic mass with no areas of hypermetabolism.  Patient was then referred back to Dr. Mihir Vega after her visit in late November, and then underwent exploratory laparotomy on femora 22nd 2018. She underwent extensive lysis of adhesions with resection of a retroperitoneal mass along with additional small bowel resection, as well as resection and primary anastomosis of left ureter. Pathology revealed metastatic adenocarcinoma involving the proximal and distal ureter, as well as metastatic adenocarcinoma from the primary retroperitoneal mass in addition to additional pieces of fibroadipose tissue also involved by disease.  The patient went home on February 26, 2018 and initially did well. She was readmitted to the hospital on February 28, 2018 secondary to worsening diarrhea and concern over possible obstruction. CT scans of the abdomen and pelvis were done which demonstrated multiple new hepatic mass lesions concerning for metastases. Postsurgical changes noted with small bowel loops suggestive of postoperative ileus.  The patient was managed  conservatively/expectantly and discharged home March 5, 2018 in stable condition.  She was then set up to come back to see me March 8, 2018 in clinic.  Restaging CT chest done 3/20/18 showed No evidence of mets.    CEA =25.  Iron studies c/w iron deficiency.   Patient was then started on FOLFIRI as noted above with Cyramza added with cycle 3.    Restaging CT scans of chest/abdomen/pelvis performed 7/20/18 noted persistent hepatice metastases which appear stable when compared to prior examination, postsurgical changes from prior left hemicolectomy with no evidence of residual or recurrent disease, seruma noted within the anterior soft tissues; corresponding CEA 8.0  Repeat CEA valentin 9/18/18 --4.9  Repeat CEA on 9/25/18--5.0  Repeat CEA on 10/17/18 --4.9  Restaging CT scans C/A/P done 12/3/18:   stable hypodense liver lesions noted, all uchanged, with the largest measuring 1.4 cm in segment 7, with no new or enlarging lesions elsewhere.    CEA = 4.9  Repeat CEA on 12/26 noted at 4.5  Repeat CEA on 1/17/19 noted at 4.6.   CEA on 2/5/19 = 5.1   Repeat CEA on 3/4/19 noted at 6.4   Restaging CT scans done March 22, 2019 with CT of the chest abdomen pelvis showed stable scattered hypodensities in the liver with stability in size and configuration compared to prior exam.  Largest in the posterior right lobe measuring 13 mm without change.  Small stable subcutaneous fluid collection over the anterior pelvis without significant changes.  No new lesions seen.      Past medical history: Colon cancer with ovarian metastases, benign liver lesions, HTN, stress incontinence, osteoarthritis, GERD, umbilical hernia, small bowel obstruction secondary to hernia.  Procedure history: Incisional hernia repair 6/4/14, knee arthroplasty, colon resection, appendectomy, cholecystectomy, hysterectomy, colonoscopy, ileostomy and removal.   Auditory laparotomy February 22, 2018 as described above  Family history: Motherbreast cancer, fatherskin  cancer  Social history: Patient lives at home with spouse. Retired. Regular diet, no exercise. Denies substance/tobacco use, never smoker.         Chief Complaint    Follow Up      Interval History   Patient presents to clinic today for scheduled follow up- currently due for Cycle #19 of treatment tomorrow.  She experiences nausea after chemotherapy althou this resolves with antiemetics. No vomiting. She takes Lomotil for diarrhea associated with chemotherapy. This is stable. Stable energy level.  No other new complaints.        Review of Systems   14 point review systems done full with pertinent positives as described in interval history. Otherwise reviewed and unremarkable.      Health Status   Allergies:    Allergic Reactions (Selected)  Severity Not Documented  Percocet 2.5/325- Nausea.,    Allergies (1) Active Reaction  Percocet 2.5/325 nausea   Current medications:  (Selected)   Inpatient Medications  Ordered  Aloxi (for IVPB): 0.25 mg, IV Piggyback, Once-chemo, Infuse over: 0.3 hr, first dose 01/02/19 9:40:00 CST, stop date 01/02/19 9:40:00 CST, Days 1  Benadryl 50mg/ml Inj: 25 mg, IV Piggyback, Once-chemo, Infuse over: 20 minute(s), first dose 01/02/19 9:40:00 CST, stop date 01/02/19 9:40:00 CST, Routine, Days 1  CCA Atropine 0.4 mg/mL PF Injection: 0.25 mg, IV Piggyback, Once-chemo, Infuse over: 20 minute(s), first dose 01/02/19 9:40:00 CST, stop date 01/02/19 9:40:00 CST, Days 1  Camptosar (for IVPB): 250 mg, IV Piggyback, Once-chemo, Infuse over: 1.5 hr, first dose 01/02/19 10:00:00 CST, stop date 01/02/19 10:00:00 CST, Days 1  Fusilev (for IVPB): 300 mg, IV Piggyback, Once-chemo, Infuse over: 1.5 hr, first dose 01/02/19 10:00:00 CST, stop date 01/02/19 10:00:00 CST, Days 1  Sodium Chloride 0.9% 1000mL: form: Infusion, IV Piggyback, Once-chemo, Infuse over: 2 hr, first dose 05/11/18 12:50:00 CDT, stop date 05/11/18 12:50:00 CDT, Days 1  dexamethasone (for IVPB): 10 mg, IV Piggyback, Once-chemo, Infuse  over: 20 minute(s), first dose 19 9:40:00 CST, stop date 19 9:40:00 CST, Days 1  fluorouracil (for IVPB) -CCA: 3,750 mg, IV Piggyback, Once-chemo, Infuse over: 46 hr, first dose 19 14:00:00 CST, stop date 19 14:00:00 CST, Days 1  fluorouracil (for IVPB) -CCA: 625 mg, IV Piggyback, Once-chemo, Infuse over: 30 minute(s), first dose 19 11:30:00 CST, stop date 19 11:30:00 CST, Days 1  fosaprepitant (for IVPB): 150 mg, form: Powder-Inj, IV Piggyback, Once-chemo, Infuse over: 30 minute(s), first dose 19 9:40:00 CST, stop date 19 9:40:00 CST, Days 1  Prescriptions  Prescribed  Lomotil 2.5 mg-0.025 mg oral tablet: 2 tab(s), Oral, q6hr, every 6 hours as needed till diarrhea is controlled, # 30 tab(s), 2 Refill(s), Pharmacy: [facility]/pharmacy #[id number]  Phenergan 25 mg Tab: 25 mg = 1 tab(s), Oral, q6hr, # 60 tab(s), 3 Refill(s), Pharmacy: [place] [id number] [id number], Patient Education Provided, Patient Verbalizes Understanding  Zofran 8 mg oral tablet: See Instructions, 1 tab(s) Oral BID for 3 days after chemotherapy and q8hr PRN nausea, # 60 tab(s), 1 Refill(s), Pharmacy: [facility]/pharmacy #[id number]  Zofran ODT 8 mg oral tablet, disintegratin mg tabs, Oral, q6hr, PRN PRN nausea/vomiting, # 15 tab(s), 1 Refill(s), Pharmacy: [id number]  ZyPREXA 10 mg oral tablet: See Instructions, 1 tab(s) Oral morning of and Daily for 2 days after chemotherapy, # 18 tab(s), 0 Refill(s), Pharmacy: [facility]/pharmacy #[id number]  Documented Medications  Documented  AMOX-CLAV 875MG TABLETS: = 1 tab(s), Oral, BID  Compazine: 0 Refill(s)  LISINOP/HCTZ TAB 10-12.5: 1 tab(s), Daily  Lomotil 2.5 mg-0.025 mg oral tablet: 1 tab(s), Oral, QID, PRN PRN for loose stool, # 24 tab(s), 0 Refill(s)  Multivitamins and Minerals oral tablet: 0 Refill(s)  hydrochlorothiazide-valsartan 12.5 mg-80 mg oral tablet: 1 tab(s), Oral, Daily, # 30 tab(s), 0 Refill(s)   Problem list:    All Problems (Selected)  Benign neoplasm of liver / SNOMED  CT 1053467976 / Confirmed  Hypertension / ICD-9-.9 / Confirmed  Cancer of descending colon / SNOMED CT 922886051 / Confirmed  Morbid obesity / SNOMED CT 567097969 / Probable  Polyp of colon / SNOMED CT 791229417 / Confirmed  Potential for infection / SNOMED CT 551794297 / Confirmed  Review of care plan / SNOMED CT 1829416850 / Confirmed  Review of care plan / SNOMED CT 9784949036 / Confirmed  Liver metastases / SNOMED CT 610550269 / Confirmed  Ovarian metastasis / SNOMED CT 968939761 / Confirmed  Valvular heart disease / SNOMED CT 1955582 / Confirmed,    Active Problems (11)  Benign neoplasm of liver   Cancer of descending colon   Hypertension   Liver metastases   Morbid obesity   Ovarian metastasis   Polyp of colon   Potential for infection   Review of care plan   Review of care plan   Valvular heart disease       Histories   Past Medical History:    Active  Hypertension (401.9)  Resolved  CA - cancer of colon ([id number]):  Resolved.  Stress incontinence (625.6):  Resolved.  Osteoarthritis (7596936196):  Resolved.  Chemotherapy (098766925):  Resolved.  Heartburn ([id number]):  Resolved.  Acid reflux (965683435):  Resolved.  Umbilical hernia (553.1):  Resolved.  Sensitive to cold (1109965884):  Resolved.  Sensitive to heat (7500136459):  Resolved.  Pregnant (624687753):  Resolved on 1/13/1976 at 20 years.  Pregnant (876717812):  Resolved on 11/30/1976 at 21 years.  Pregnant (232736128):  Resolved on 1/12/1982 at 26 years.   Family History:    Primary malignant neoplasm of breast  Mother  Comments:  12/30/2013 13:28 - Amada Olson LPN  mother is alive with hx of breast ca  Acute myocardial infarction  Father  Skin cancer  Father  Comments:  12/30/2013 13:29 - Amada Olson LPN  father is alive with hx of ca     Procedure history:    mediport placement on 7/5/2018 at 62 Years.  Resection Retroperitoneal Mass (.) on 2/22/2018 at 62  Years.  Comments:  2/22/2018 17:26 - Debora ALCALA, Cheo Kevin  auto-populated from documented surgical case  Cystoscopy w/ Ureteral Stent (Left) on 8/9/2017 at 61 Years.  Comments:  8/9/2017 14:34 - Mirela ALCALA, Susan  auto-populated from documented surgical case  Date of last mammogram (9019316293) on 10/2/2015 at 59 Years.  Comments:  9/27/2016 13:40 - Tamara Lee  WNL  Hernia Repair Incisional (.) on 6/4/2014 at 58 Years.  Comments:  6/4/2014 12:12 - Mo ALCALA, Shannon CORDOVA  auto-populated from documented surgical case  Knee arthroplasty (V43.65).  colon resection.  Appendectomy (168150479).  Cholecystectomy (26518690).  Hysterectomy (258015964).  Colonoscopy (617338104).  illeostomy and removal.  spinal and knee injections.   Social History        Social & Psychosocial Habits    Alcohol  05/17/2012 Risk Assessment: Denies Alcohol Use    10/23/2018  Use: Never    Employment/School  04/14/2014  Status: Retired    Exercise  04/14/2014 Risk Assessment: Does not exercise    Home/Environment  04/14/2014  Lives with: Spouse    Nutrition/Health  04/14/2014  Type of diet: Regular    Substance Abuse  05/17/2012 Risk Assessment: Denies Substance Abuse    10/23/2018  Use: Never    Tobacco  05/17/2012 Risk Assessment: Denies Tobacco Use    12/07/2018  Use: Never smoker    Patient Wants Consult For Cessation Counseling No    12/31/2018  Use: Never (less than 100 in l    Patient Wants Consult For Cessation Counseling No    03/06/2019  Use: Never (less than 100 in l    Patient Wants Consult For Cessation Counseling N/A    03/08/2019  Use: Never (less than 100 in l    Patient Wants Consult For Cessation Counseling No    03/25/2019  Use: Never (less than 100 in l    Patient Wants Consult For Cessation Counseling N/A    03/26/2019  Use: Never (less than 100 in l    Patient Wants Consult For Cessation Counseling N/A    03/29/2019  Use: Never (less than 100 in l    Patient Wants Consult For Cessation Counseling N/A.         Physical Examination   Vital Signs   4/15/2019 9:49 CDT       Temperature Oral          36.5 DegC                             Temperature Oral (calculated)             97.70 DegF                             Peripheral Pulse Rate     87 bpm                             Systolic Blood Pressure   147 mmHg  HI                             Diastolic Blood Pressure  76 mmHg     Measurements from flowsheet : Measurements   4/15/2019 9:49 CDT       Weight Dosing             85.5 kg                             Weight Measured           85.5 kg                             Weight Measured and Calculated in Lbs     188.49 lb                             Height/Length Dosing      152 cm                             Height/Length Measured    152 cm                             BSA Measured              1.9 m2                             Body Mass Index Measured  37.01 kg/m2     General:  Alert and oriented, No acute distress.    Eye:  Pupils are equal, round and reactive to light, Normal conjunctiva, Vision unchanged.    HENT:  Normocephalic, Oral mucosa is moist.    Neck:  Supple, Non-tender, No lymphadenopathy.    Respiratory:  Lungs are clear to auscultation, Respirations are non-labored, Breath sounds are equal.    Cardiovascular:  Normal rate, Regular rhythm, No edema.    Gastrointestinal:  Soft, Non-tender, Non-distended, Normal bowel sounds, Midline incision well healed   .    Genitourinary:  No inguinal tenderness.    Lymphatics:  No lymphadenopathy neck, axilla, groin.    Musculoskeletal:  No deformity, Normal gait.    Integumentary:  Warm, Dry, Intact.    Neurologic:  Alert, Oriented, No focal deficits.    Cognition and Speech:  Speech clear and coherent.    Psychiatric:  Cooperative, Appropriate mood & affect.    ECOG Performance Scale: 1- Strenuous physical activity restricted; fully ambulatory and able to carry out light work.      Review / Management   Results review      Impression and Plan   Diagnosis     1. 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 Patient had recurrence/mets to ovaries in 2011. See note below for more details.  KRAS mutation +  2. Recurrent metastatic dz with  dz noted in the retroperitoneum with involvement of the proximal and distal ureter status post exploratory laparotomy with resection and lysis of adhesions and primary anastomosis of left ureter performed February 22nd 2018 by Dr. Gary Pugh, along with liver metastases and mesenteric adenopathy.  3. Left hydroureteronephrosis s/p JJ stent placement during hospitalization in August 2017.   4. Treatment induced diarrhea-stable without any worsening of symptoms  5.  Treatment-induced nausea   .     Plan:  Okay to proceed with cyce #19 of  FOLFIRI + Cyramza  until dz progression or intolerance--> continue with 25% dose reduction of irinotecan, and 15% reduction of 5FU/Leukovorin --> will treat tomorrow.  Continue octreotide and atropine to prevent loose stools.  IV hydration and  Octreotide to be administered on Day 3, along with Neulasta 3mg SQ.   IV hydration + Octreotide on Day 4 as well   Continue PRN management of intermittent diarrhea with lomotil-Refilled today.  Continue anti-medics as needed  Maintain adequate nutrition/hydration daily   Continue with biotene/ warm water, salt, baking soda rinses TID  Continue Vitamin b complex and glutamine powder daily   RTC weekly for labs   Repeat CEA in 2 weeks  Return to clinic in 3 weeks with CBC BMP and treatment visit for cycle 19    ABIEL Arthur   .

## 2022-04-30 NOTE — PROGRESS NOTES
Patient:   Terri Penny            MRN: [id number]            FIN: [id number]               Age:   62 years     Sex:  Female     :  1955   Associated Diagnoses:   None   Author:   Joyce RAMON, Cora CHO      Patient called clinic this morning reporting continual diarrhea throughout the night as well as worsening nausea. Instructed to report to clinic today for additional IV hydration + Octreotide. Will plan to administer 1000ml NS + 200mcg Octreotide + 16mg Zofran. Instructed patient to maintain adequate hydration daily and to continue Lomotil use as prescribed. Instructed patient to notify clinic should diarrhea worsen or report to nearest emergency room for further evaluation. Patient verbalized understanding of instructions given.     -Cora CARDONA, FNP-C

## 2022-04-30 NOTE — PROGRESS NOTES
Patient:   Terri Penny            MRN: [id number]            FIN: [id number]               Age:   62 years     Sex:  Female     :  1955   Associated Diagnoses:   None   Author:   Joyce RAMON, Cora CHO      Patient seen in infusion today to discontinue pump. She is once again having issues with loose stools. She reports that symptoms began last night. She had three episodes of diarrhea during the night and six episodes already this morning despite utilizing Lomotil as prescribed. Orders written to administer 100mcg of Octreotide today with fluids. Instructed patient to notify clinic in A.M. of status and if diarrhea has continued- if so will plan to see her tomorrow tomorrow morning for additional fluids + Octreotide. Patient verbalized understanding.     STELLA CarrizalesC

## 2022-04-30 NOTE — PROGRESS NOTES
Patient:   Terri Penny            MRN: [id number]            FIN: [id number]               Age:   63 years     Sex:  Female     :  1955   Associated Diagnoses:   None   Author:   James RAMON, Cora DUQUE      Visit Information   PROBLEM LIST  1. 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 Patient had recurrence/mets to ovaries in . See note below for more details.  KRAS mutation +  2. Recurrent metastatic dz with  dz noted in the retroperitoneum with involvement of the proximal and distal ureter status post exploratory laparotomy with resection and lysis of adhesions and primary anastomosis of left ureter performed 2018 by Dr. Vega and Dr. Pugh.  3. Liver metastases along with mesenteric LAD.   4. Left hydroureteronephrosis s/p JJ stent placement during hospitalization in 2017.   5. Treatment induced diarrhea.  Stable  following dose reductions.      CURRENT THERAPY:  FOLFIRI + Cyramza start date 18-- q 3 weeks (for tolerance)  Due for C#17  on 3/6/19-- continue 15% dose reduction of 5FU and levoleucovorin/25% dose  reduction of Irinotecan secondary to diarrhea.   Continue with IV hydration + Octreotide on Day 3 (will give Neulasta with this cycle); Will add additional dose of IV hydration + Octreotide on Day 4 given reports of worsening diarrhea       TREATMENT HISTORY:  Adjuvant XELIRI + Bevacizumab initiated 11, completed 10/2011  FLOX chemotherapy 3/17/10, completed 8/11/10    HISTORY:  Terri Penny presented to the emergency room in [facility] 2/12/10, with an obstructed colon. A colonoscopy by Dr. Brambila revealed an obstructing friable lesion at 50cm in the region of the splenic flexure and descending colon. The scope  was not able to be passed beyond the obstructing lesion. Dr. Zaragoza performed a colectomy with ileostomy placement as well as a cholecystectomy and appendectomy. Pathology revealed infiltrating moderately differentiated mucinous adenocarcinoma with tumor extension through all layers of the bowel wall and into the pericolonic fat, free surgical margins, and 0 of 46 lymph nodes. Also, acute appendicitis with perforation and a periappendiceal abscess as well as acute cholecystitis were found. The patient initiated adjuvant FLOX chemotherapy 3/17/10 and completed treatment on 8/11/10. Her ileostomy was reversed 9/22/10. At her 2/10/11 follow-up visit the CEA had increased from 1/8 to 3.5. The CEA was repeated one month later found to have increased further to 4.5. She was referred for CT scans which revealed a 10.6 x 6.5 cm left adnexal mass. Mammogram 10/25/10 was benign. MRI of the abdomen 11/8/10 indicated an indeterminate lesion post segment of the right hepatic lobe thought to be a cyst was stable compared to prior scan. CXR was clear.    Because of the possibility of a new ovarian primary versus metastatic colon carcinoma revealed in the CT, she underwent resection by Dr. 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 She was initiated on adjuvant Bevacizumab, Irinotecan, and Xeloda 5/18/11. She did not experience diarrhea but did have several days of nausea that was well controlled with Zofran. No palmar/plantar complaints. Patient completed 8 courses of treatment on 10/14/11. Tumor was found positive for KRAS mutation and unresponsive to EGFR inhibitor agents.    CT abdomen/pelvis 8/29/11 revealed no new metastatic lesion. There were 2 lesions in the liver which were present in the 2/2010 films that were very slightly enlarged.    PET/CT 11/1/11 showed no metastasis. She did have vague uptake in the terminal ileum related to the  ileitis for which she was admitted to [facility] 10/29/11 and resolved.    PET/CT 2/9/2012 showed no definite residual malignancy or metastatic disease; new mildly increased patchy FDG uptake in the L-hepatic lobe indeterminate with no corresponding CT lesion. US was diagnostic of a hemangioma.    She underwent colonoscopy 6/22/2012 which revealed 2 small adenomatous polyps with no dysplasia. Dr. Vences recommended colonoscopy in one year.    PET/CT 2/2012: no evidence of disease.  PET/CT 8/27/12: no evidence of metastatic disease with incidental cystic structure in the left hemipelvis noted on prior exams.  PET/CT 12/5/12: low density cystic mass at left anterior adnexa, probable lymphocele no increased FDG uptake. No evidence of metastatic disease.  PET/CT 3/13: no evidence of disease. CEA=2.3  CT scans 9/26/2013 with no acute findings suspicious for recurrent disease. Stable simple-appearing cystic lesion in the left hemipelvis has not changed in size measuring 7 x 4.8 cm possibly representing postoperative seroma. CEA=2.4  Repeat PET/CT 1/10/14 (following rise in CEA to 4.0) showed LENNY.   CT scan of the abdomen 04/03/2014 secondary to abdominal pain showed partial small bowel obstruction  CT 7/16/14 of C/A/P:  no evidence of new metastases.  Post operative large seroma noted measuring 19 cm, along with left pelvic fluid collection.   CT scans done January 13, 2015 show approximately 1.4 cm hypodense lesion in the posterior portion of hepatic segment 7 which is more conspicuous on the current study but unchanged in size from the prior exam.  However, no comment was made of this lesion from the radiologist's report from the prior scans in July 2014.    CEA = 4.0 with prior CEA of 3.0  Restaging CT scans done 7/14/15--> LENNY. Stable benign right posterior liver lesion measuring 1.4 cm.  CEA WNL  repeat CEA from October 2015 = 4.  Repeat CT C/A/P done 02/03/16 LENNY--->02/03/16 CEA =4  Colonoscopy done July 19, 2016 was  unremarkable.  CT scans of the chest abdomen pelvis done August 5, 2016 show slightly larger fluid collection anterior abdomen with stable hypodensity posterior aspect of the right hepatic lobe.  No new lesions.   CEA from August 5, 2016 = 6  Repeat CEA from November 1, 2016 = 7, increased slightly from August.  Repeat CT scans on February 2, 2017 shows stable posterior right hepatic lobe density measuring 1.2 cm, with stable subcutaneous fluid collection, and stable left adnexal cystic lesion.  CEA = 8  Repeat CEA from May 5, 2017 stable = 8  Patient was admitted August 6, 2017 after presenting with abdominal pain that began the day before with subsequent  development of vomiting episodes.    CT scan done August 6, 2017 at the time of presentation revealed dilated loops of small bowel likely consistent with ileus or partial obstruction.  Redemonstration of moderate left hydroureteronephrosis with obstruction at the level of the pelvic inlet adjacent to the previously described soft tissue density and cystic lesion.  The patient had undergone restaging CT scans 2 days prior in August 4, 2017 as part of her routine follow-up visit with me which revealed 4 mm nodule along the right minor fissure likely benign etiology--with stable findings compared to prior scans.  Within the abdomen and pelvis, moderate left hydronephrosis with left ureter obstructed at the level of the pelvic inlet adjacent to a 5.1 x 6.4 cm cyst along with a 3.0 x 2.1 cm soft tissue density respectively.  Radiologist comments that the soft tissue density had measured 2.8 x 2.1 cm on prior scan from February 27, 2016.  However, when looking back at the radiology report from February 2016, no mention was made of such a lesion.  Urology and surgery were consulted.  Urology placed a JJ stent on the left and Dr. Zaragoza is a general surgery opted for conservative management, with subsequent resolution of her symptoms.  At the time of this consultation  August 10, 2017, she is moving her bowels and eating well without difficulty with plan to discharge home and follow-up with Dr. Vega for potential biopsy.  She then underwent CT-guided biopsy of this lesion at [facility] ordered by Dr. Vega on August 29, 2017.  Pathology showed abdominal seroma with scattered lymphocytes and fibrin and no evidence of malignancy.  CEA from 11/14/17 = 11 (previously 8).   CT of C/A/P on 11/14 shows similar size of LLQ cystic and solid soft tissue lesions.   New/additional prominent mesenteric LN measuring 11 mm.    Improved hydronephrosis on the left.    PET/CT done November 28, 2017 shows previously described mesenteric lymph node with stable size without any appreciable FDG activity but with 2 cm focus of hypermetabolism at nearby segment of small salud with SUV uptake of l 5.4.  Additional 3 cm mass in the left lower quadrant which is hypermetabolic with SUV uptake of 11.6.  Additional 6 cm left lower quadrant cystic mass with no areas of hypermetabolism.  Patient was then referred back to Dr. Mihir Vega after her visit in late November, and then underwent exploratory laparotomy on femora 22nd 2018. She underwent extensive lysis of adhesions with resection of a retroperitoneal mass along with additional small bowel resection, as well as resection and primary anastomosis of left ureter. Pathology revealed metastatic adenocarcinoma involving the proximal and distal ureter, as well as metastatic adenocarcinoma from the primary retroperitoneal mass in addition to additional pieces of fibroadipose tissue also involved by disease.  The patient went home on February 26, 2018 and initially did well. She was readmitted to the hospital on February 28, 2018 secondary to worsening diarrhea and concern over possible obstruction. CT scans of the abdomen and pelvis were done which demonstrated multiple new hepatic mass lesions concerning for metastases. Postsurgical changes noted  with small bowel loops suggestive of postoperative ileus.  The patient was managed conservatively/expectantly and discharged home March 5, 2018 in stable condition.  She was then set up to come back to see me March 8, 2018 in clinic.  Restaging CT chest done 3/20/18 showed No evidence of mets.    CEA =25.  Iron studies c/w iron deficiency.   Patient was then started on FOLFIRI as noted above with Cyramza added with cycle 3.    Restaging CT scans of chest/abdomen/pelvis performed 7/20/18 noted persistent hepatice metastases which appear stable when compared to prior examination, postsurgical changes from prior left hemicolectomy with no evidence of residual or recurrent disease, seruma noted within the anterior soft tissues; corresponding CEA 8.0  Repeat CEA valentin 9/18/18 --4.9  Repeat CEA on 9/25/18--5.0  Repeat CEA on 10/17/18 --4.9  Restaging CT scans C/A/P done 12/3/18:   stable hypodense liver lesions noted, all uchanged, with the largest measuring 1.4 cm in segment 7, with no new or enlarging lesions elsewhere.    CEA = 4.9  Repeat CEA on 12/26 noted at 4.5  Repeat CEA on 1/17/19 noted at 4.6.   CEA on 2/5/19 = 5.1   Repeat CEA on 3/4/19 noted at 6.4       Past medical history: Colon cancer with ovarian metastases, benign liver lesions, HTN, stress incontinence, osteoarthritis, GERD, umbilical hernia, small bowel obstruction secondary to hernia.  Procedure history: Incisional hernia repair 6/4/14, knee arthroplasty, colon resection, appendectomy, cholecystectomy, hysterectomy, colonoscopy, ileostomy and removal.   Auditory laparotomy February 22, 2018 as described above  Family history: Motherbreast cancer, fatherskin cancer  Social history: Patient lives at home with spouse. Retired. Regular diet, no exercise. Denies substance/tobacco use, never smoker.         Chief Complaint    Follow Up      Interval History   Patient presents to clinic today for scheduled follow up- currently due for Cycle #17 of treatment  on 3/6/19.  She reports worsening diarrhea following last cycle of treatment- averaging 4 episodes/day for approximately 2 weeks after treatment despite use of Lomotil. This is odd considering her diarrhea has been relatively stable up until this point. Patient reports that symptoms were manageable to her. Denies melena or BRBPR. Her appetite has remained stable. Weight stable as well. Labs today are reviewed and unremarkable. Denies fever or chills or other signs of infection. Stools have regulated. Denies HA, SOB, CP, N/V/C. VSS. Denies need for medication refill at this time.             Review of Systems   14 point review systems done full with pertinent positives as described in interval history. Otherwise reviewed and unremarkable.      Health Status   Allergies:    Allergic Reactions (Selected)  Severity Not Documented  Percocet 2.5/325- Nausea.,    Allergies (1) Active Reaction  Percocet 2.5/325 nausea   Current medications:  (Selected)   Outpatient Medications  Ordered  Aloxi (for IVPB): 0.25 mg, IV Piggyback, Once-chemo, Infuse over: 0.3 hr, first dose 01/02/19 9:40:00 CST, stop date 01/02/19 9:40:00 CST, Days 1  Benadryl 50mg/ml Inj: 25 mg, IV Piggyback, Once-chemo, Infuse over: 20 minute(s), first dose 01/02/19 9:40:00 CST, stop date 01/02/19 9:40:00 CST, Routine, Days 1  CCA Atropine 0.4 mg/mL PF Injection: 0.25 mg, IV Piggyback, Once-chemo, Infuse over: 20 minute(s), first dose 01/02/19 9:40:00 CST, stop date 01/02/19 9:40:00 CST, Days 1  Camptosar (for IVPB): 250 mg, IV Piggyback, Once-chemo, Infuse over: 1.5 hr, first dose 01/02/19 10:00:00 CST, stop date 01/02/19 10:00:00 CST, Days 1  Fusilev (for IVPB): 300 mg, IV Piggyback, Once-chemo, Infuse over: 1.5 hr, first dose 01/02/19 10:00:00 CST, stop date 01/02/19 10:00:00 CST, Days 1  Sodium Chloride 0.9% 1000mL: form: Infusion, IV Piggyback, Once-chemo, Infuse over: 2 hr, first dose 05/11/18 12:50:00 CDT, stop date 05/11/18 12:50:00 CDT, Days  1  dexamethasone (for IVPB): 10 mg, IV Piggyback, Once-chemo, Infuse over: 20 minute(s), first dose 19 9:40:00 CST, stop date 19 9:40:00 CST, Days 1  fluorouracil (for IVPB) -CCA: 3,750 mg, IV Piggyback, Once-chemo, Infuse over: 46 hr, first dose 19 14:00:00 CST, stop date 19 14:00:00 CST, Days 1  fluorouracil (for IVPB) -CCA: 625 mg, IV Piggyback, Once-chemo, Infuse over: 30 minute(s), first dose 19 11:30:00 CST, stop date 19 11:30:00 CST, Days 1  fosaprepitant (for IVPB): 150 mg, form: Powder-Inj, IV Piggyback, Once-chemo, Infuse over: 30 minute(s), first dose 19 9:40:00 CST, stop date 19 9:40:00 CST, Days 1  Prescriptions  Prescribed  Lomotil 2.5 mg-0.025 mg oral tablet: 2 tab(s), Oral, q6hr, every 6 hours as needed till diarrhea is controlled, # 30 tab(s), 2 Refill(s), Pharmacy: [facility]/pharmacy #[id number]  Phenergan 25 mg Tab: 25 mg = 1 tab(s), Oral, q6hr, # 60 tab(s), 3 Refill(s), Pharmacy: [place] [id number] [id number], Patient Education Provided, Patient Verbalizes Understanding  Zofran 8 mg oral tablet: See Instructions, 1 tab(s) Oral BID for 3 days after chemotherapy and q8hr PRN nausea, # 60 tab(s), 1 Refill(s), Pharmacy: [id number]  Zofran ODT 8 mg oral tablet, disintegratin mg tabs, Oral, q6hr, PRN PRN nausea/vomiting, # 15 tab(s), 1 Refill(s), Pharmacy: [place] [address] [id number]  ZyPREXA 10 mg oral tablet: See Instructions, 1 tab(s) Oral morning of and Daily for 2 days after chemotherapy, # 18 tab(s), 0 Refill(s), Pharmacy: [facility]/pharmacy #[id number]  Documented Medications  Documented  AMOX-CLAV 875MG TABLETS: = 1 tab(s), Oral, BID  Compazine: 0 Refill(s)  LISINOP/HCTZ TAB 10-12.5: 1 tab(s), Daily  Lomotil 2.5 mg-0.025 mg oral tablet: 1 tab(s), Oral, QID, PRN PRN for loose stool, # 24 tab(s), 0 Refill(s)  Multivitamins and Minerals oral tablet: 0 Refill(s)  hydrochlorothiazide-valsartan 12.5 mg-80 mg oral tablet: 1 tab(s), Oral, Daily, # 30 tab(s), 0 Refill(s)    Problem list:    All Problems (Selected)  Benign neoplasm of liver / SNOMED CT 7828064920 / Confirmed  Hypertension / ICD-9-.9 / Confirmed  Cancer of descending colon / SNOMED CT 561321948 / Confirmed  Morbid obesity / SNOMED CT 244909744 / Probable  Polyp of colon / SNOMED CT 280063070 / Confirmed  Potential for infection / SNOMED CT 944089519 / Confirmed  Review of care plan / SNOMED CT 8747101831 / Confirmed  Review of care plan / SNOMED CT 2849517859 / Confirmed  Liver metastases / SNOMED CT 643457948 / Confirmed  Ovarian metastasis / SNOMED CT 318741411 / Confirmed  Valvular heart disease / SNOMED CT 8793337 / Confirmed,    Active Problems (11)  Benign neoplasm of liver   Cancer of descending colon   Hypertension   Liver metastases   Morbid obesity   Ovarian metastasis   Polyp of colon   Potential for infection   Review of care plan   Review of care plan   Valvular heart disease       Physical Examination   Vital Signs   3/4/2019 13:44 CST       Temperature Oral          36.6 DegC                             Temperature Oral (calculated)             97.88 DegF                             Peripheral Pulse Rate     79 bpm                             Respiratory Rate          20 br/min                             SpO2                      98 %                             Systolic Blood Pressure   147 mmHg  HI                             Diastolic Blood Pressure  84 mmHg                             Blood Pressure Location   Left arm                             Manual Cuff BP            No                             O2 SAT at rest            98 %     Measurements from flowsheet : Measurements   3/4/2019 13:44 CST       Weight Dosing             88 kg                             Weight Measured           88 kg                             Weight Measured and Calculated in Lbs     194.00 lb                             Height/Length Dosing      152 cm                             Height/Length Measured    152 cm                              BSA Measured              1.93 m2                             Body Mass Index Measured  38.09 kg/m2     General:  Alert and oriented, No acute distress.    Eye:  Pupils are equal, round and reactive to light, Normal conjunctiva, Vision unchanged.    HENT:  Normocephalic, Oral mucosa is moist.    Neck:  Supple, Non-tender, No lymphadenopathy.    Respiratory:  Lungs are clear to auscultation, Respirations are non-labored, Breath sounds are equal.    Cardiovascular:  Normal rate, Regular rhythm, No edema.    Gastrointestinal:  Soft, Non-tender, Non-distended, Normal bowel sounds, Midline incision well healed   .    Genitourinary:  No inguinal tenderness.    Lymphatics:  No lymphadenopathy neck, axilla, groin.    Musculoskeletal:  No deformity, Normal gait.    Integumentary:  Warm, Dry, Intact.    Neurologic:  Alert, Oriented, No focal deficits.    Cognition and Speech:  Speech clear and coherent.    Psychiatric:  Cooperative, Appropriate mood & affect.    ECOG Performance Scale: 1- Strenuous physical activity restricted; fully ambulatory and able to carry out light work.      Review / Management   Results review:  Last 10 Days Lab Results : Lab View   3/4/2019 13:53 CST       POC Sodium                138 mmol/L                             POC Potassium             4.5 mmol/L                             POC Chloride              104 mmol/L                             POC Ion Calcium           1.17 mmol/L                             POC Glucose               91 mg/dL                             POC BUN                   21.0 mg/dL                             POC Creatinine            1.0 mg/dL                             POC AGAP                  16.0  NA                             POC Hb                    13.3 mg/dL                             POC Hct                   39.0 %                             POC TCO2                  23.0 mmol/L  LOW    3/4/2019 13:39 CST       WBC                        10.1 x10(3)/mcL                             RBC                       4.05 x10(6)/mcL  LOW                             Hgb                       13.1 gm/dL                             Hct                       41.4 %                             Platelet                  240 x10(3)/mcL                             MCV                       102.2 fL  HI                             MCH                       32.3 pg  HI                             MCHC                      31.6 gm/dL  LOW                             RDW                       15.7 %                             MPV                       9.2 fL  LOW                             Abs Neut                  7.21 x10(3)/mcL                             NEUT%                     71.5 %  NA                             NEUT#                     7.2 x10(3)/mcL                             LYMPH%                    20.1 %  NA                             LYMPH#                    2.0 x10(3)/mcL                             MONO%                     6.3 %  NA                             MONO#                     0.6 x10(3)/mcL                             EOS%                      1.0 %  NA                             EOS#                      0.1 x10(3)/mcL                             BASO%                     0.3 %  NA                             BASO#                     0.0 x10(3)/mcL                             [facility]            Negative  .       Impression and Plan   Diagnosis     1. 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 Patient had recurrence/mets to ovaries in 2011. See note below for more details.  KRAS mutation +  2. Recurrent metastatic dz with  dz noted in the retroperitoneum with involvement of the proximal and distal ureter status  post exploratory laparotomy with resection and lysis of adhesions and primary anastomosis of left ureter performed February 22nd 2018 by Dr. Gary Pugh.  3. Liver metastases along with mesenteric LAD.   4. Left hydroureteronephrosis s/p JJ stent placement during hospitalization in August 2017.   5. Treatment induced diarrhea- worsened following cycle #16    .     Plan:  OK to proceed with cyce #17 of  FOLFIRI + Cyramza  until dz progression or intolerance--> continue with 25% dose reduction of irinotecan, and 15% reduction of 5FU/Leukovorin   We will need to keep a close eye on this worsening diarrhea- which up until this last cycle has been stable. Will maintain current dose reductions at this time but will plan to add additional dose of octreotide post treatment  IV hydration and  Octreotide to be administered on Day 3, along with Neulasta 3mg SQ.   IV hydration + Octreotide on Day 4 as well   Continue PRN management of intermittent diarrhea with lomotil  Refill of Zofran sent to patient pharmacy per request  Maintain adequate nutrition/hydration daily   Continue with biotene/ warm water, salt, baking soda rinses TID  Continue Vitamin b complex and glutamine powder daily   RTC weekly for labs   Plan for repeat CT C/A/P following cycle #17  RTC in 3 weeks for OV to review scan results  CBC, BMP  same day lab   Call clinic with any questions or concerns     CINTHYA Nguyen     .

## 2022-04-30 NOTE — PROGRESS NOTES
Patient:   Terri Penny            MRN: [id number]            FIN: [id number]               Age:   64 years     Sex:  Female     :  1955   Associated Diagnoses:   None   Author:   Clay ORTIZ MD, Desmond GAMBLE      Visit Information   PROBLEM LIST  1. 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 Patient had recurrence/mets to ovaries in . See note below for more details.  KRAS mutation +  2. Recurrent metastatic dz with  dz noted in the retroperitoneum with involvement of the proximal and distal ureter status post exploratory laparotomy with resection and lysis of adhesions and primary anastomosis of left ureter performed 2018 by Dr. Vega and Dr. Pugh.  3. Liver metastases along with mesenteric LAD.   4. Left hydroureteronephrosis s/p JJ stent placement during hospitalization in 2017.   5. Treatment induced diarrhea.  Much improved since stopping chemo.         CURRENT THERAPY:  1.  Started FOLFOX and bevacizumab on 3/24/2020.    TREATMENT HISTORY:  1.  Adjuvant XELIRI + Bevacizumab initiated 11, completed 10/2011  2.  FLOX chemotherapy 3/17/10, completed 8/11/10  3.  FOLFIRI plus Cyramza started 4-18 given every 3 weeks for tolerance.  Completed 23 cycles as of 2019.  Treatment was then discontinued based on worsening side effects and patient was given a treatment break.  4.  FOLFIRI plus Cyramza reinitiated cycle 24 on 12/10/2019 due to progressive disease in her liver -- Due for Cycle #28 on 3/10/2020 however discontinued due to disease progression.      HISTORY:  Terri Penny presented to the emergency room in [facility] 2/12/10, with an obstructed colon. A colonoscopy by Dr. Brambila revealed an obstructing friable lesion at  50cm in the region of the splenic flexure and descending colon. The scope was not able to be passed beyond the obstructing lesion. Dr. Zaragoza performed a colectomy with ileostomy placement as well as a cholecystectomy and appendectomy. Pathology revealed infiltrating moderately differentiated mucinous adenocarcinoma with tumor extension through all layers of the bowel wall and into the pericolonic fat, free surgical margins, and 0 of 46 lymph nodes. Also, acute appendicitis with perforation and a periappendiceal abscess as well as acute cholecystitis were found. The patient initiated adjuvant FLOX chemotherapy 3/17/10 and completed treatment on 8/11/10. Her ileostomy was reversed 9/22/10. At her 2/10/11 follow-up visit the CEA had increased from 1/8 to 3.5. The CEA was repeated one month later found to have increased further to 4.5. She was referred for CT scans which revealed a 10.6 x 6.5 cm left adnexal mass. Mammogram 10/25/10 was benign. MRI of the abdomen 11/8/10 indicated an indeterminate lesion post segment of the right hepatic lobe thought to be a cyst was stable compared to prior scan. CXR was clear.    Because of the possibility of a new ovarian primary versus metastatic colon carcinoma revealed in the CT, she underwent resection by Dr. 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 She was initiated on adjuvant Bevacizumab, Irinotecan, and Xeloda 5/18/11. She did not experience diarrhea but did have several days of nausea that was well controlled with Zofran. No palmar/plantar complaints. Patient completed 8 courses of treatment on 10/14/11. Tumor was found positive for KRAS mutation and unresponsive to EGFR inhibitor agents.    CT abdomen/pelvis 8/29/11 revealed no new metastatic lesion. There were 2 lesions in the liver which were present in the 2/2010 films that were very slightly enlarged.    PET/CT 11/1/11 showed no  metastasis. She did have vague uptake in the terminal ileum related to the ileitis for which she was admitted to [facility] 10/29/11 and resolved.    PET/CT 2/9/2012 showed no definite residual malignancy or metastatic disease; new mildly increased patchy FDG uptake in the L-hepatic lobe indeterminate with no corresponding CT lesion. US was diagnostic of a hemangioma.    She underwent colonoscopy 6/22/2012 which revealed 2 small adenomatous polyps with no dysplasia. Dr. Vences recommended colonoscopy in one year.    PET/CT 2/2012: no evidence of disease.  PET/CT 8/27/12: no evidence of metastatic disease with incidental cystic structure in the left hemipelvis noted on prior exams.  PET/CT 12/5/12: low density cystic mass at left anterior adnexa, probable lymphocele no increased FDG uptake. No evidence of metastatic disease.  PET/CT 3/13: no evidence of disease. CEA=2.3  CT scans 9/26/2013 with no acute findings suspicious for recurrent disease. Stable simple-appearing cystic lesion in the left hemipelvis has not changed in size measuring 7 x 4.8 cm possibly representing postoperative seroma. CEA=2.4  Repeat PET/CT 1/10/14 (following rise in CEA to 4.0) showed LENNY.   CT scan of the abdomen 04/03/2014 secondary to abdominal pain showed partial small bowel obstruction  CT 7/16/14 of C/A/P:  no evidence of new metastases.  Post operative large seroma noted measuring 19 cm, along with left pelvic fluid collection.   CT scans done January 13, 2015 show approximately 1.4 cm hypodense lesion in the posterior portion of hepatic segment 7 which is more conspicuous on the current study but unchanged in size from the prior exam.  However, no comment was made of this lesion from the radiologist's report from the prior scans in July 2014.    CEA = 4.0 with prior CEA of 3.0  Restaging CT scans done 7/14/15--> LENNY. Stable benign right posterior liver lesion measuring 1.4 cm.  CEA WNL  repeat CEA from October 2015 = 4.  Repeat CT C/A/P  done 02/03/16 LENNY--->02/03/16 CEA =4  Colonoscopy done July 19, 2016 was unremarkable.  CT scans of the chest abdomen pelvis done August 5, 2016 show slightly larger fluid collection anterior abdomen with stable hypodensity posterior aspect of the right hepatic lobe.  No new lesions.   CEA from August 5, 2016 = 6  Repeat CEA from November 1, 2016 = 7, increased slightly from August.  Repeat CT scans on February 2, 2017 shows stable posterior right hepatic lobe density measuring 1.2 cm, with stable subcutaneous fluid collection, and stable left adnexal cystic lesion.  CEA = 8  Repeat CEA from May 5, 2017 stable = 8  Patient was admitted August 6, 2017 after presenting with abdominal pain that began the day before with subsequent  development of vomiting episodes.    CT scan done August 6, 2017 at the time of presentation revealed dilated loops of small bowel likely consistent with ileus or partial obstruction.  Redemonstration of moderate left hydroureteronephrosis with obstruction at the level of the pelvic inlet adjacent to the previously described soft tissue density and cystic lesion.  The patient had undergone restaging CT scans 2 days prior in August 4, 2017 as part of her routine follow-up visit with me which revealed 4 mm nodule along the right minor fissure likely benign etiology--with stable findings compared to prior scans.  Within the abdomen and pelvis, moderate left hydronephrosis with left ureter obstructed at the level of the pelvic inlet adjacent to a 5.1 x 6.4 cm cyst along with a 3.0 x 2.1 cm soft tissue density respectively.  Radiologist comments that the soft tissue density had measured 2.8 x 2.1 cm on prior scan from February 27, 2016.  However, when looking back at the radiology report from February 2016, no mention was made of such a lesion.  Urology and surgery were consulted.  Urology placed a JJ stent on the left and Dr. Zaragoza is a general surgery opted for conservative management, with  subsequent resolution of her symptoms.  At the time of this consultation August 10, 2017, she is moving her bowels and eating well without difficulty with plan to discharge home and follow-up with Dr. Vega for potential biopsy.  She then underwent CT-guided biopsy of this lesion at [facility] ordered by Dr. Vega on August 29, 2017.  Pathology showed abdominal seroma with scattered lymphocytes and fibrin and no evidence of malignancy.  CEA from 11/14/17 = 11 (previously 8).   CT of C/A/P on 11/14 shows similar size of LLQ cystic and solid soft tissue lesions.   New/additional prominent mesenteric LN measuring 11 mm.    Improved hydronephrosis on the left.    PET/CT done November 28, 2017 shows previously described mesenteric lymph node with stable size without any appreciable FDG activity but with 2 cm focus of hypermetabolism at nearby segment of small salud with SUV uptake of l 5.4.  Additional 3 cm mass in the left lower quadrant which is hypermetabolic with SUV uptake of 11.6.  Additional 6 cm left lower quadrant cystic mass with no areas of hypermetabolism.  Patient was then referred back to Dr. Mihir Vega after her visit in late November, and then underwent exploratory laparotomy on femora 22nd 2018. She underwent extensive lysis of adhesions with resection of a retroperitoneal mass along with additional small bowel resection, as well as resection and primary anastomosis of left ureter. Pathology revealed metastatic adenocarcinoma involving the proximal and distal ureter, as well as metastatic adenocarcinoma from the primary retroperitoneal mass in addition to additional pieces of fibroadipose tissue also involved by disease.  The patient went home on February 26, 2018 and initially did well. She was readmitted to the hospital on February 28, 2018 secondary to worsening diarrhea and concern over possible obstruction. CT scans of the abdomen and pelvis were done which demonstrated multiple new  hepatic mass lesions concerning for metastases. Postsurgical changes noted with small bowel loops suggestive of postoperative ileus.  The patient was managed conservatively/expectantly and discharged home March 5, 2018 in stable condition.  She was then set up to come back to see me March 8, 2018 in clinic.  Restaging CT chest done 3/20/18 showed No evidence of mets.    CEA =25.  Iron studies c/w iron deficiency.   Patient was then started on FOLFIRI as noted above with Cyramza added with cycle 3.    Restaging CT scans of chest/abdomen/pelvis performed 7/20/18 noted persistent hepatice metastases which appear stable when compared to prior examination, postsurgical changes from prior left hemicolectomy with no evidence of residual or recurrent disease, seruma noted within the anterior soft tissues; corresponding CEA 8.0  Repeat CEA valentin 9/18/18 --4.9  Repeat CEA on 9/25/18--5.0  Repeat CEA on 10/17/18 --4.9  Restaging CT scans C/A/P done 12/3/18:   stable hypodense liver lesions noted, all uchanged, with the largest measuring 1.4 cm in segment 7, with no new or enlarging lesions elsewhere.    CEA = 4.9  Repeat CEA on 12/26 noted at 4.5  Repeat CEA on 1/17/19 noted at 4.6.   CEA on 2/5/19 = 5.1   Repeat CEA on 3/4/19 noted at 6.4   Restaging CT scans done March 22, 2019 with CT of the chest abdomen pelvis showed stable scattered hypodensities in the liver with stability in size and configuration compared to prior exam.  Largest in the posterior right lobe measuring 13 mm without change.  Small stable subcutaneous fluid collection over the anterior pelvis without significant changes.  No new lesions seen.  CEA  4/29/19 = 5.5  CEA on 5/20/19 = 5.2  Repeat CEA from 7/3/2019 = 5.6.   We will plan for restaging scans after cycle 23 to be done later in July.  Repeat CT C/A/P on 7/26/19 showed stable liver lesion to posterior right lobe, persistent fluid collecition in the subcutaneous tissue overlying the pelvis, unchanged;  no evidence of new metastatic disease. CEA noted at 6.9  She completed 23 cycles of treatment July 9, 2019 and then was put on treatment holiday as noted above following restaging scans that showed overall stability  Repeat CEA done 9/6/2019 = 5.3.  Previously 8.0.  Repeat CEA on 10/18/19 noted at 6.5   Patient seen at [facility] ED on 11/1/19 s/t abdominal pain/nausea/vomiting. Admitted for concern for small bowel obstruction. CT A/P obtained showed new/progressive hepatic metastases increased in size/number when compared to previous; enhancing 1.7cm nodule within the distal left ureter; findings concerning with small bowel obstructions/t mild enteritis and possible adhesions.  Seen by surgery for consideration of surgical repair however recommendations made for conservative treatment.   CEA on 11/15/19 noted at 14.6     CT of the thorax on 11/26/2019 showed no evidence of new progressive metastatic disease in the chest, however multiple hepatic metastases are mildly increased in size.  CEA was up to 19.9 on that same day.  Plan on reinitiating FOLFIRI and Cyramza on 12/10/2019.    CEA on 2/17/2020 was 27.2.  Restaging CT scans on 3/4/2020 showed no evidence of metastatic intrathoracic disease.  There was numerous metastatic liver lesions that are slightly larger and more apparent in the interval.  This suggest worsening disease burden.  The scattered mildly prominent abdominopelvic lymph nodes are unchanged in the interval with no new focal lesions or adenopathy identified.    CT-guided liver biopsy done on 3/18/2020 revealed metastatic colorectal adenocarcinoma, moderately differentiated.  Caris from that biopsy specimen showed a KRAS mutation, MSI was stable, tumor mutational burden was intermediate with 7 mutations per megabase, BRAF negative, PTEN positive.  This suggested increase benefit to FOLFOX plus bevacizumab.    Past medical history: Colon cancer with ovarian metastases, benign liver lesions, HTN, stress  incontinence, osteoarthritis, GERD, umbilical hernia, small bowel obstruction secondary to hernia.  Procedure history: Incisional hernia repair 6/4/14, knee arthroplasty, colon resection, appendectomy, cholecystectomy, hysterectomy, colonoscopy, ileostomy and removal.   Auditory laparotomy February 22, 2018 as described above  Family history: Motherbreast cancer, fatherskin cancer  Social history: Patient lives at home with spouse. Retired. Regular diet, no exercise. Denies substance/tobacco use, never smoker.         Chief Complaint    Follow Up      Interval History   Patient here for scheduled follow up and for cycle 3 of FOLFOX with bevacizumab.  She has been feeling much better since I saw her last.  Her nausea, vomiting, and diarrhea have improved.  She states that she has had 5 or 6 really good days.  She has no other major complaints or questions.  She is ready to get her next cycle of chemotherapy.      Review of Systems   14 point review systems done full with pertinent positives as described in interval history. Otherwise reviewed and unremarkable.      Health Status   Allergies:    Allergic Reactions (Selected)  Severity Not Documented  Percocet 2.5/325- Nausea.,    Allergies (1) Active Reaction  Percocet 2.5/325 nausea     Current medications:  (Selected)   Outpatient Medications  Ordered  Aloxi (for IVPB): 0.25 mg, IV Piggyback, Once-chemo, Infuse over: 0.3 hr, first dose 01/02/19 9:40:00 CST, stop date 01/02/19 9:40:00 CST, Days 1  Benadryl 50mg/ml Inj: 25 mg, IV Piggyback, Once-chemo, Infuse over: 20 minute(s), first dose 01/02/19 9:40:00 CST, stop date 01/02/19 9:40:00 CST, Routine, Days 1  CCA Atropine 0.4 mg/mL PF Injection: 0.25 mg, IV Piggyback, Once-chemo, Infuse over: 20 minute(s), first dose 01/02/19 9:40:00 CST, stop date 01/02/19 9:40:00 CST, Days 1  Camptosar (for IVPB): 250 mg, IV Piggyback, Once-chemo, Infuse over: 1.5 hr, first dose 01/02/19 10:00:00 CST, stop date 01/02/19 10:00:00  CST, Days 1  Fusilev (for IVPB): 300 mg, IV Piggyback, Once-chemo, Infuse over: 1.5 hr, first dose 01/02/19 10:00:00 CST, stop date 01/02/19 10:00:00 CST, Days 1  Sodium Chloride 0.9% 1000mL: form: Infusion, IV Piggyback, Once-chemo, Infuse over: 2 hr, first dose 05/11/18 12:50:00 CDT, stop date 05/11/18 12:50:00 CDT, Days 1  dexamethasone (for IVPB): 10 mg, IV Piggyback, Once-chemo, Infuse over: 20 minute(s), first dose 01/02/19 9:40:00 CST, stop date 01/02/19 9:40:00 CST, Days 1  fluorouracil (for IVPB) -CCA: 3,750 mg, IV Piggyback, Once-chemo, Infuse over: 46 hr, first dose 01/02/19 14:00:00 CST, stop date 01/02/19 14:00:00 CST, Days 1  fluorouracil (for IVPB) -CCA: 625 mg, IV Piggyback, Once-chemo, Infuse over: 30 minute(s), first dose 01/02/19 11:30:00 CST, stop date 01/02/19 11:30:00 CST, Days 1  fosaprepitant (for IVPB): 150 mg, form: Powder-Inj, IV Piggyback, Once-chemo, Infuse over: 30 minute(s), first dose 01/02/19 9:40:00 CST, stop date 01/02/19 9:40:00 CST, Days 1  Future  Aloxi (for IVPB): 0.25 mg, form: Injection, IV Piggyback, Once-chemo, Infuse over: 20 minute(s), *Est. first dose 05/05/20 9:41:00 CDT, *Est. stop date 05/05/20 9:41:00 CDT, Future Order, Days 1  Benadryl (for IVPB): 25 mg, IV Piggyback, Once-chemo, Infuse over: 20 minute(s), *Est. first dose 05/05/20 9:41:00 CDT, *Est. stop date 05/05/20 9:41:00 CDT, Future Order, Days 1  Heparin Flush 100 U/mL - 5 mL: 500 units, form: Injection, IV Push, Once-chemo, *Est. first dose 05/07/20 9:41:00 CDT, *Est. stop date 05/07/20 9:41:00 CDT, Routine, Days 3, Future Order  Leucovorin (for IVPB): 662.4 mg, form: Injection, IV Piggyback, Once-chemo, Infuse over: 2 hr, *Est. first dose 05/05/20 10:01:00 CDT, *Est. stop date 05/05/20 10:01:00 CDT, Future Order, Days 1  MVASI  (for IVPB): 395.55 mg, form: Soln, IV Piggyback, Once-chemo, Infuse over: 60 minute(s), *Est. first dose 05/05/20 9:41:00 CDT, *Est. stop date 05/05/20 9:41:00 CDT, Future Order,  Days 1  acetaminophen: 650 mg, form: Tab, Oral, Once-chemo, *Est. first dose 20 9:41:00 CDT, *Est. stop date 20 9:41:00 CDT, Routine, Pre-Med, Days 1, Future Order  dexamethasone (for IVPB): 10 mg, IV Piggyback, Once-chemo, Infuse over: 20 minute(s), *Est. first dose 20 9:41:00 CDT, *Est. stop date 20 9:41:00 CDT, Future Order, Days 1  fluorouracil (for IVPB) -CCA: 3,974.4 mg, IV Piggyback, Once-chemo, Infuse over: 46 hr, *Est. first dose 20 13:01:00 CDT, *Est. stop date 20 13:01:00 CDT, Future Order, Days 1  fluorouracil (for IVPB) -CCA: 662.4 mg, IV Piggyback, Once-chemo, Infuse over: 30 minute(s), *Est. first dose 20 12:01:00 CDT, *Est. stop date 20 12:01:00 CDT, Future Order, Days 1  fosaprepitant (for IVPB): 150 mg, IV Piggyback, Once-chemo, Infuse over: 30 minute(s), *Est. first dose 20 9:41:00 CDT, *Est. stop date 20 9:41:00 CDT, Future Order, Days 1  oxaliplatin (for IVPB): 140.76 mg, form: Injection, IV Piggyback, Once-chemo, Infuse over: 2 hr, *Est. first dose 20 10:01:00 CDT, *Est. stop date 20 10:01:00 CDT, Future Order, Days 1  Prescriptions  Prescribed  Lomotil 2.5 mg-0.025 mg oral tablet: 2 tab(s), Oral, q6hr, every 6 hours as needed till diarrhea is controlled, # 30 tab(s), 2 Refill(s), Pharmacy: [facility]/pharmacy #[id number], 152, cm, Height/Length Dosing, 20 8:42:00 CDT, 82.9, kg, Weight Dosing, 20 8:42:00 CDT  Phenergan 25 mg Tab: 25 mg = 1 tab(s), Oral, q6hr, # 60 tab(s), 3 Refill(s), Pharmacy: [place] [id number] [id number], Patient Education Provided, Patient Verbalizes Understanding  Zofran ODT 4 mg oral tablet, disintegratin mg = 1 tab(s), Oral, TID, PRN PRN nausea, # 10 tab(s), 0 Refill(s), Pharmacy: [facility]/pharmacy #[id number], 152, cm, Height/Length Dosing, 20 11:33:00 CDT, 87.4, kg, Weight Dosing, 20 11:33:00 CDT  Zofran ODT 8 mg oral tablet, disintegratin mg tabs, Oral, q6hr, PRN PRN nausea/vomiting, # 15 tab(s),  1 Refill(s), [id number], 152, cm, Height/Length Dosing, 03/26/20 9:37:00 CDT, 93.1, kg, Weight Dosing, 03/26/20 9:37:00 CDT  dicyclomine 20 mg oral tablet: 20 mg = 1 tab(s), Oral, QID, # 28 tab(s), 0 Refill(s), [id number], 152, cm, Height/Length Dosing, 04/09/20 11:33:00 CDT, 87.4, kg, Weight Dosing, 04/09/20 11:33:00 CDT  olanzapine 10 mg oral tablet: See Instructions, 1 tab(s) Oral morning of chemo and daily for 2 days following chemo, # 18 tab(s), 0 Refill(s), [id number], 152, cm, Height/Length Dosing, 02/18/20 8:20:00 CST, 90.8, kg, Weight Dosing, 02/18/20 8:20:00 CST  ondansetron 8 mg oral tablet: 8 mg = 1 tab(s), Oral, QID, # 120 tab(s), 0 Refill(s), [id number], 152, cm, Height/Length Dosing, 04/22/20 8:42:00 CDT, 82.9, kg, Weight Dosing, 04/22/20 8:42:00 CDT  Documented Medications  Documented  Compazine: 0 Refill(s)  LISINOP/HCTZ TAB 10-12.5: 1 tab(s), Oral, Daily  Multivitamins and Minerals oral tablet: 1 tab, Oral, Daily, 0 Refill(s)  levothyroxine 25 mcg (0.025 mg) oral tablet: 1 tab, Oral, Daily, on empty stomach   Problem list:    All Problems  Benign neoplasm of liver / SNOMED CT 9285713840 / Confirmed  Colon cancer screening / SNOMED CT 7829783290 / Confirmed  GERD - Gastro-esophageal reflux disease / SNOMED CT 0231585351 / Confirmed  Hypertension / ICD-9-.9 / Confirmed  Cancer of descending colon / SNOMED CT 248095433 / Confirmed  Morbid obesity / SNOMED CT 391347289 / Probable  Ovarian cancer / SNOMED CT 3237589974 / Confirmed  Polyp of colon / SNOMED CT 434865931 / Confirmed  Potential for infection / SNOMED CT 632389600 / Confirmed  Review of care plan / SNOMED CT 3094518441 / Confirmed  Review of care plan / SNOMED CT 4044834237 / Confirmed  Liver metastases / SNOMED CT 005942258 / Confirmed  Ovarian metastasis / SNOMED CT 621223036 / Confirmed  Valvular heart disease / SNOMED CT 9689088 / Confirmed  Inactive:  [facility] in [facility]  Problem automatically updated based on documentation on Nutritional Risk Factors or  Unintentional Weight Loss.  Inactive: At risk for aspiration / SNOMED CT 3682352171  Problem automatically updated based on documentation on Cough, Oxygen Therapy, Aspiration Risk, CN IX, X Swallowing Gag Reflex, GI Symptoms, Nutrition Risk Factors, and/or Enteral Tube Type.  Inactive: At risk for falls / SNOMED CT 682864934  Problem automatically updated based on documentation on History of Falls, History of Falls in last 3 months Florentino, Florentino Fall Risk Score and/or ADLs.  Inactive: At risk for nutritional problem / SNOMED CT 223630815  Problem automatically updated based on documentation on Nutritional Risk Factors or  Unintentional Weight Loss.  Inactive: At risk of healthcare associated infection / SNOMED CT 7149727896  Problem automatically updated based on Central IV Access Type, Arterial Line Site, PA Catheter Type, Pacemaker Type.  Inactive: At risk of pressure sore / SNOMED CT 933269821  Inactive: Bowel dysfunction / [facility] 433951292  Problem added automatically by system based on documentation of Bowel Sounds as Hyperactive, Hyperactive, or Absent;  GI Symptoms as Hyperactive, Constipation, or Diarrhea and/or Passing Flatus as No.  Problem added automatically by system based on documentation of Bowel Sounds as Hyperactive, Hyperactive, or Absent;  GI Symptoms as Hyperactive, Constipation, or Diarrhea and/or Passing Flatus as No.  Inactive: Disorder of fluid balance / SNOMED CT 571001180  Problem automatically updated based on documentation on Edema, Anasarca, Edema, Generalized, Edema, Bilateral Periorbital, Edema, Face, Edema,, Bilateral Arm, Edema, Left Arm, Edema, Right Arm, Edema, Bilateral Hand, Edema, Left Hand, Edema, Right Hand, Edema, Labia, Edema, Scrotum, Edema, Penile, Edema, Bilateral Upper Leg, Edema, Bilateral Lower Leg, Edema, Bilateral Pretibial, Edema,  Bilateral Ankle, Edema, Bilateral Pedal, GI Symptoms, Skin Turgor General, and/or Gestational Age Method.  Resolved: Acid reflux / SNOMED CT 521284482  Resolved: Alteration in patterns of urinary elimination / SNOMED CT 20667570  Problem automatically updated based on documentation on Genitourinary Symptoms and/or Urinary Elimination.  Resolved: At risk of UTI / SNOMED CT 146279653  Problem automatically updated based on documentation on Urinary Elimination.  Resolved: CA - cancer of colon / SNOMED CT [id number]  Resolved: Chemotherapy / SNOMED CT [id number]  Resolved: Heartburn / SNOMED CT [id number]  Resolved: Osteoarthritis / SNOMED CT 0844930625  Resolved: Pregnant / SNOMED CT 136701803  Resolved: Pregnant / SNOMED CT 321831376  Resolved: Pregnant / SNOMED CT 673916412  Resolved: Sensitive to cold / SNOMED CT [id number]  Resolved: Sensitive to heat / SNOMED CT 3205080506  Resolved: Stress incontinence / ICD-9-.6  Resolved: Umbilical hernia / ICD-9-.1  Canceled: Radiation therapy care / SNOMED CT 2598465375,    Active Problems (14)  Benign neoplasm of liver   Cancer of descending colon   Colon cancer screening   GERD - Gastro-esophageal reflux disease   Hypertension   Liver metastases   Morbid obesity   Ovarian cancer   Ovarian metastasis   Polyp of colon   Potential for infection   Review of care plan   Review of care plan   Valvular heart disease         Physical Examination   Vital Signs   5/5/2020 9:20 CDT        Temperature Oral          36.5 DegC                             Temperature Oral (calculated)             97.70 DegF                             Peripheral Pulse Rate     71 bpm                             Respiratory Rate          20 br/min                             SpO2                      98 %                             Oxygen Therapy            Room air                             Systolic Blood Pressure   103 mmHg                              Diastolic Blood Pressure  59 mmHg  LOW                             Blood Pressure Location   Right arm                             Manual Cuff BP            No                             O2 SAT at rest            98 %     Measurements from flowsheet : Measurements   5/5/2020 9:20 CDT        Weight Dosing             87.9 kg                             Weight Measured           87.9 kg                             Weight Measured and Calculated in Lbs     193.78 lb                             Height/Length Dosing      152 cm                             Height/Length Measured    152 cm                             BSA Measured              1.93 m2                             Body Mass Index Measured  38.05 kg/m2     General:  Alert and oriented.         Ambulation status: Assistive devices ( Cane ).         Appearance: Overweight.         Skin: No clubbing, No edema, Not jaundiced.    Eye:  Pupils are equal, round and reactive to light, Normal conjunctiva, Vision unchanged.    HENT:  Normocephalic, Oral mucosa is moist.    Neck:  Supple, Non-tender, No lymphadenopathy.    Respiratory:  Respirations are non-labored, Breath sounds are equal, Symmetrical chest wall expansion.    Cardiovascular:  Normal rate, Regular rhythm, No edema.    Gastrointestinal:  Soft, Non-tender, Non-distended, Normal bowel sounds, Midline surgical scar   .    Genitourinary:  No costovertebral angle tenderness.    Musculoskeletal:       Mobility/ gait: Able to walk with a cane,    .    Integumentary:  Warm, Dry, Intact.    Neurologic:  Alert, Oriented, No focal deficits.    Cognition and Speech:  Speech clear and coherent.    Psychiatric:  Cooperative, Appropriate mood & affect.    ECOG Performance Scale: 1- Strenuous physical activity restricted; fully ambulatory and able to carry out light work.      Review / Management   Results review:  Lab results   5/5/2020 8:53 CDT        [facility]            Negative    5/5/2020 8:52 CDT         WBC                       6.4 x10(3)/mcL                             RBC                       3.75 x10(6)/mcL  LOW                             Hgb                       12.3 gm/dL                             Hct                       38.5 %                             Platelet                  193 x10(3)/mcL                             MCV                       102.7 fL  HI                             MCH                       32.8 pg  HI                             MCHC                      31.9 gm/dL  LOW                             RDW                       15.9 %                             MPV                       8.9 fL  LOW                             Abs Neut                  3.90 x10(3)/mcL                             NEUT%                     61.2 %  NA                             NEUT#                     3.9 x10(3)/mcL                             LYMPH%                    26.1 %  NA                             LYMPH#                    1.7 x10(3)/mcL                             MONO%                     11.1 %  NA                             MONO#                     0.7 x10(3)/mcL                             EOS%                      0.6 %  NA                             EOS#                      0.0 x10(3)/mcL                             BASO%                     0.5 %  NA                             BASO#                     0.0 x10(3)/mcL  .       Impression and Plan   Diagnosis     1. 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 Patient had recurrence/mets to ovaries in 2011. See note below for more details.  KRAS mutation +  2. Recurrent metastatic dz with  dz noted in the retroperitoneum with involvement of the proximal and distal ureter status post exploratory laparotomy with  resection and lysis of adhesions and primary anastomosis of left ureter performed February 22nd 2018 by Dr. Gary Pugh, along with liver metastases and mesenteric adenopathy.  3. Left hydroureteronephrosis s/p JJ stent placement during hospitalization in August 2017.   4. Treatment induced diarrhea-imrpoved since stopping chemo.   5. Repeat CT A/P on 11/1/19 with noted increase in liver lesion; restaging chest CT on 11/26/2019 showed worsening disease in her liver, no new disease in her chest..     Plan:    -She started FOLFOX and bevacizumab, cycle 1 day 1 on 3/24/2020.    -We dose reduced her 5% across the board and added Emend with cycle 2, however the patient still did not tolerate this.    -She will get cycle 3 today, we will decrease her another 5% for a total of 10% dose reduction.  We will also change her from every 2-week treatment to every 3-week treatment to see if she better tolerates this.    -We will continue her current antiemetics and antidiarrheal medications.    -She will return to clinic in 3 weeks for cycle 4 of treatment.    -We would plan to scan her after 4 cycles of FOLFOX and bevacizumab.      All questions were answered to the best of my ability and the patient understands the plan moving forward.    Desmond Williamson II, MD.

## 2022-04-30 NOTE — PROGRESS NOTES
Patient:   Terri Penny            MRN: [id number]            FIN: [id number]               Age:   64 years     Sex:  Female     :  1955   Associated Diagnoses:   None   Author:   Clay ORTIZ MD, Desmond GAMBLE      Visit Information   PROBLEM LIST  1. 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 Patient had recurrence/mets to ovaries in . See note below for more details.  KRAS mutation +  2. Recurrent metastatic dz with  dz noted in the retroperitoneum with involvement of the proximal and distal ureter status post exploratory laparotomy with resection and lysis of adhesions and primary anastomosis of left ureter performed 2018 by Dr. Vega and Dr. Pugh.  3. Liver metastases along with mesenteric LAD.   4. Left hydroureteronephrosis s/p JJ stent placement during hospitalization in 2017.   5. Treatment induced diarrhea.  Much improved since stopping chemo.         CURRENT THERAPY:  1.  Started FOLFOX and bevacizumab on 3/24/2020.    TREATMENT HISTORY:  1.  Adjuvant XELIRI + Bevacizumab initiated 11, completed 10/2011  2.  FLOX chemotherapy 3/17/10, completed 8/11/10  3.  FOLFIRI plus Cyramza started 4-18 given every 3 weeks for tolerance.  Completed 23 cycles as of 2019.  Treatment was then discontinued based on worsening side effects and patient was given a treatment break.  4.  FOLFIRI plus Cyramza reinitiating cycle 24 on 12/10/2019 due to progressive disease in her liver -- Due for Cycle #28 on 3/10/2020 however discontinued due to disease progression.      HISTORY:  Terri Penny presented to the emergency room in [facility] 2/12/10, with an obstructed colon. A colonoscopy by Dr. Brambila revealed an obstructing friable lesion at  50cm in the region of the splenic flexure and descending colon. The scope was not able to be passed beyond the obstructing lesion. Dr. Zaragoza performed a colectomy with ileostomy placement as well as a cholecystectomy and appendectomy. Pathology revealed infiltrating moderately differentiated mucinous adenocarcinoma with tumor extension through all layers of the bowel wall and into the pericolonic fat, free surgical margins, and 0 of 46 lymph nodes. Also, acute appendicitis with perforation and a periappendiceal abscess as well as acute cholecystitis were found. The patient initiated adjuvant FLOX chemotherapy 3/17/10 and completed treatment on 8/11/10. Her ileostomy was reversed 9/22/10. At her 2/10/11 follow-up visit the CEA had increased from 1/8 to 3.5. The CEA was repeated one month later found to have increased further to 4.5. She was referred for CT scans which revealed a 10.6 x 6.5 cm left adnexal mass. Mammogram 10/25/10 was benign. MRI of the abdomen 11/8/10 indicated an indeterminate lesion post segment of the right hepatic lobe thought to be a cyst was stable compared to prior scan. CXR was clear.    Because of the possibility of a new ovarian primary versus metastatic colon carcinoma revealed in the CT, she underwent resection by Dr. 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 She was initiated on adjuvant Bevacizumab, Irinotecan, and Xeloda 5/18/11. She did not experience diarrhea but did have several days of nausea that was well controlled with Zofran. No palmar/plantar complaints. Patient completed 8 courses of treatment on 10/14/11. Tumor was found positive for KRAS mutation and unresponsive to EGFR inhibitor agents.    CT abdomen/pelvis 8/29/11 revealed no new metastatic lesion. There were 2 lesions in the liver which were present in the 2/2010 films that were very slightly enlarged.    PET/CT 11/1/11 showed no  metastasis. She did have vague uptake in the terminal ileum related to the ileitis for which she was admitted to [facility] 10/29/11 and resolved.    PET/CT 2/9/2012 showed no definite residual malignancy or metastatic disease; new mildly increased patchy FDG uptake in the L-hepatic lobe indeterminate with no corresponding CT lesion. US was diagnostic of a hemangioma.    She underwent colonoscopy 6/22/2012 which revealed 2 small adenomatous polyps with no dysplasia. Dr. Vences recommended colonoscopy in one year.    PET/CT 2/2012: no evidence of disease.  PET/CT 8/27/12: no evidence of metastatic disease with incidental cystic structure in the left hemipelvis noted on prior exams.  PET/CT 12/5/12: low density cystic mass at left anterior adnexa, probable lymphocele no increased FDG uptake. No evidence of metastatic disease.  PET/CT 3/13: no evidence of disease. CEA=2.3  CT scans 9/26/2013 with no acute findings suspicious for recurrent disease. Stable simple-appearing cystic lesion in the left hemipelvis has not changed in size measuring 7 x 4.8 cm possibly representing postoperative seroma. CEA=2.4  Repeat PET/CT 1/10/14 (following rise in CEA to 4.0) showed LENNY.   CT scan of the abdomen 04/03/2014 secondary to abdominal pain showed partial small bowel obstruction  CT 7/16/14 of C/A/P:  no evidence of new metastases.  Post operative large seroma noted measuring 19 cm, along with left pelvic fluid collection.   CT scans done January 13, 2015 show approximately 1.4 cm hypodense lesion in the posterior portion of hepatic segment 7 which is more conspicuous on the current study but unchanged in size from the prior exam.  However, no comment was made of this lesion from the radiologist's report from the prior scans in July 2014.    CEA = 4.0 with prior CEA of 3.0  Restaging CT scans done 7/14/15--> LENNY. Stable benign right posterior liver lesion measuring 1.4 cm.  CEA WNL  repeat CEA from October 2015 = 4.  Repeat CT C/A/P  done 02/03/16 LENNY--->02/03/16 CEA =4  Colonoscopy done July 19, 2016 was unremarkable.  CT scans of the chest abdomen pelvis done August 5, 2016 show slightly larger fluid collection anterior abdomen with stable hypodensity posterior aspect of the right hepatic lobe.  No new lesions.   CEA from August 5, 2016 = 6  Repeat CEA from November 1, 2016 = 7, increased slightly from August.  Repeat CT scans on February 2, 2017 shows stable posterior right hepatic lobe density measuring 1.2 cm, with stable subcutaneous fluid collection, and stable left adnexal cystic lesion.  CEA = 8  Repeat CEA from May 5, 2017 stable = 8  Patient was admitted August 6, 2017 after presenting with abdominal pain that began the day before with subsequent  development of vomiting episodes.    CT scan done August 6, 2017 at the time of presentation revealed dilated loops of small bowel likely consistent with ileus or partial obstruction.  Redemonstration of moderate left hydroureteronephrosis with obstruction at the level of the pelvic inlet adjacent to the previously described soft tissue density and cystic lesion.  The patient had undergone restaging CT scans 2 days prior in August 4, 2017 as part of her routine follow-up visit with me which revealed 4 mm nodule along the right minor fissure likely benign etiology--with stable findings compared to prior scans.  Within the abdomen and pelvis, moderate left hydronephrosis with left ureter obstructed at the level of the pelvic inlet adjacent to a 5.1 x 6.4 cm cyst along with a 3.0 x 2.1 cm soft tissue density respectively.  Radiologist comments that the soft tissue density had measured 2.8 x 2.1 cm on prior scan from February 27, 2016.  However, when looking back at the radiology report from February 2016, no mention was made of such a lesion.  Urology and surgery were consulted.  Urology placed a JJ stent on the left and Dr. Zaragoza is a general surgery opted for conservative management, with  subsequent resolution of her symptoms.  At the time of this consultation August 10, 2017, she is moving her bowels and eating well without difficulty with plan to discharge home and follow-up with Dr. Vega for potential biopsy.  She then underwent CT-guided biopsy of this lesion at [facility] ordered by Dr. Vega on August 29, 2017.  Pathology showed abdominal seroma with scattered lymphocytes and fibrin and no evidence of malignancy.  CEA from 11/14/17 = 11 (previously 8).   CT of C/A/P on 11/14 shows similar size of LLQ cystic and solid soft tissue lesions.   New/additional prominent mesenteric LN measuring 11 mm.    Improved hydronephrosis on the left.    PET/CT done November 28, 2017 shows previously described mesenteric lymph node with stable size without any appreciable FDG activity but with 2 cm focus of hypermetabolism at nearby segment of small salud with SUV uptake of l 5.4.  Additional 3 cm mass in the left lower quadrant which is hypermetabolic with SUV uptake of 11.6.  Additional 6 cm left lower quadrant cystic mass with no areas of hypermetabolism.  Patient was then referred back to Dr. Mihir Vega after her visit in late November, and then underwent exploratory laparotomy on femora 22nd 2018. She underwent extensive lysis of adhesions with resection of a retroperitoneal mass along with additional small bowel resection, as well as resection and primary anastomosis of left ureter. Pathology revealed metastatic adenocarcinoma involving the proximal and distal ureter, as well as metastatic adenocarcinoma from the primary retroperitoneal mass in addition to additional pieces of fibroadipose tissue also involved by disease.  The patient went home on February 26, 2018 and initially did well. She was readmitted to the hospital on February 28, 2018 secondary to worsening diarrhea and concern over possible obstruction. CT scans of the abdomen and pelvis were done which demonstrated multiple new  hepatic mass lesions concerning for metastases. Postsurgical changes noted with small bowel loops suggestive of postoperative ileus.  The patient was managed conservatively/expectantly and discharged home March 5, 2018 in stable condition.  She was then set up to come back to see me March 8, 2018 in clinic.  Restaging CT chest done 3/20/18 showed No evidence of mets.    CEA =25.  Iron studies c/w iron deficiency.   Patient was then started on FOLFIRI as noted above with Cyramza added with cycle 3.    Restaging CT scans of chest/abdomen/pelvis performed 7/20/18 noted persistent hepatice metastases which appear stable when compared to prior examination, postsurgical changes from prior left hemicolectomy with no evidence of residual or recurrent disease, seruma noted within the anterior soft tissues; corresponding CEA 8.0  Repeat CEA valentin 9/18/18 --4.9  Repeat CEA on 9/25/18--5.0  Repeat CEA on 10/17/18 --4.9  Restaging CT scans C/A/P done 12/3/18:   stable hypodense liver lesions noted, all uchanged, with the largest measuring 1.4 cm in segment 7, with no new or enlarging lesions elsewhere.    CEA = 4.9  Repeat CEA on 12/26 noted at 4.5  Repeat CEA on 1/17/19 noted at 4.6.   CEA on 2/5/19 = 5.1   Repeat CEA on 3/4/19 noted at 6.4   Restaging CT scans done March 22, 2019 with CT of the chest abdomen pelvis showed stable scattered hypodensities in the liver with stability in size and configuration compared to prior exam.  Largest in the posterior right lobe measuring 13 mm without change.  Small stable subcutaneous fluid collection over the anterior pelvis without significant changes.  No new lesions seen.  CEA  4/29/19 = 5.5  CEA on 5/20/19 = 5.2  Repeat CEA from 7/3/2019 = 5.6.   We will plan for restaging scans after cycle 23 to be done later in July.  Repeat CT C/A/P on 7/26/19 showed stable liver lesion to posterior right lobe, persistent fluid collecition in the subcutaneous tissue overlying the pelvis, unchanged;  no evidence of new metastatic disease. CEA noted at 6.9  She completed 23 cycles of treatment July 9, 2019 and then was put on treatment holiday as noted above following restaging scans that showed overall stability  Repeat CEA done 9/6/2019 = 5.3.  Previously 8.0.  Repeat CEA on 10/18/19 noted at 6.5   Patient seen at [facility] ED on 11/1/19 s/t abdominal pain/nausea/vomiting. Admitted for concern for small bowel obstruction. CT A/P obtained showed new/progressive hepatic metastases increased in size/number when compared to previous; enhancing 1.7cm nodule within the distal left ureter; findings concerning with small bowel obstructions/t mild enteritis and possible adhesions.  Seen by surgery for consideration of surgical repair however recommendations made for conservative treatment.   CEA on 11/15/19 noted at 14.6     CT of the thorax on 11/26/2019 showed no evidence of new progressive metastatic disease in the chest, however multiple hepatic metastases are mildly increased in size.  CEA was up to 19.9 on that same day.  Plan on reinitiating FOLFIRI and Cyramza on 12/10/2019.    CEA on 2/17/2020 was 27.2.  Restaging CT scans on 3/4/2020 showed no evidence of metastatic intrathoracic disease.  There was numerous metastatic liver lesions that are slightly larger and more apparent in the interval.  This suggest worsening disease burden.  The scattered mildly prominent abdominopelvic lymph nodes are unchanged in the interval with no new focal lesions or adenopathy identified.    CT-guided liver biopsy done on 3/18/2020 revealed metastatic colorectal adenocarcinoma, moderately differentiated.    Past medical history: Colon cancer with ovarian metastases, benign liver lesions, HTN, stress incontinence, osteoarthritis, GERD, umbilical hernia, small bowel obstruction secondary to hernia.  Procedure history: Incisional hernia repair 6/4/14, knee arthroplasty, colon resection, appendectomy, cholecystectomy, hysterectomy,  colonoscopy, ileostomy and removal.   Auditory laparotomy February 22, 2018 as described above  Family history: Motherbreast cancer, fatherskin cancer  Social history: Patient lives at home with spouse. Retired. Regular diet, no exercise. Denies substance/tobacco use, never smoker.         Chief Complaint    Follow Up       4/7/2020 9:20 CDT        patient stated no complaints for today    4/7/2020 9:19 CDT        patient stated no complaints for today        Interval History   Patient here for scheduled follow up and for cycle 2 of FOLFOX with bevacizumab.  The patient had to go to the ER after cycle 1 with nausea vomiting abdominal pain and diarrhea.  She states that she felt bad for the entire 2 weeks and just recently started feeling better than yesterday or the day before.  This was bothersome to her.  Otherwise, she has no major complaints at this time.  Of note, her Caris was denied by her insurance.         Review of Systems   14 point review systems done full with pertinent positives as described in interval history. Otherwise reviewed and unremarkable.      Health Status   Allergies:    Allergic Reactions (Selected)  Severity Not Documented  Percocet 2.5/325- Nausea.,    Allergies (1) Active Reaction  Percocet 2.5/325 nausea     Current medications:  (Selected)   Outpatient Medications  Ordered  Aloxi (for IVPB): 0.25 mg, IV Piggyback, Once-chemo, Infuse over: 0.3 hr, first dose 01/02/19 9:40:00 CST, stop date 01/02/19 9:40:00 CST, Days 1  Benadryl 50mg/ml Inj: 25 mg, IV Piggyback, Once-chemo, Infuse over: 20 minute(s), first dose 01/02/19 9:40:00 CST, stop date 01/02/19 9:40:00 CST, Routine, Days 1  CCA Atropine 0.4 mg/mL PF Injection: 0.25 mg, IV Piggyback, Once-chemo, Infuse over: 20 minute(s), first dose 01/02/19 9:40:00 CST, stop date 01/02/19 9:40:00 CST, Days 1  Camptosar (for IVPB): 250 mg, IV Piggyback, Once-chemo, Infuse over: 1.5 hr, first dose 01/02/19 10:00:00 CST, stop date 01/02/19  10:00:00 CST, Days 1  Fusilev (for IVPB): 300 mg, IV Piggyback, Once-chemo, Infuse over: 1.5 hr, first dose 01/02/19 10:00:00 CST, stop date 01/02/19 10:00:00 CST, Days 1  Sodium Chloride 0.9% 1000mL: form: Infusion, IV Piggyback, Once-chemo, Infuse over: 2 hr, first dose 05/11/18 12:50:00 CDT, stop date 05/11/18 12:50:00 CDT, Days 1  dexamethasone (for IVPB): 10 mg, IV Piggyback, Once-chemo, Infuse over: 20 minute(s), first dose 01/02/19 9:40:00 CST, stop date 01/02/19 9:40:00 CST, Days 1  fluorouracil (for IVPB) -CCA: 3,750 mg, IV Piggyback, Once-chemo, Infuse over: 46 hr, first dose 01/02/19 14:00:00 CST, stop date 01/02/19 14:00:00 CST, Days 1  fluorouracil (for IVPB) -CCA: 625 mg, IV Piggyback, Once-chemo, Infuse over: 30 minute(s), first dose 01/02/19 11:30:00 CST, stop date 01/02/19 11:30:00 CST, Days 1  fosaprepitant (for IVPB): 150 mg, form: Powder-Inj, IV Piggyback, Once-chemo, Infuse over: 30 minute(s), first dose 01/02/19 9:40:00 CST, stop date 01/02/19 9:40:00 CST, Days 1  Future  Aloxi (for IVPB): 0.25 mg, IV Piggyback, Once-chemo, Infuse over: 20 minute(s), *Est. first dose 04/07/20 9:53:00 CDT, *Est. stop date 04/07/20 9:53:00 CDT, Future Order, Days 1  Benadryl (for IVPB): 25 mg, IV Piggyback, Once-chemo, Infuse over: 20 minute(s), *Est. first dose 04/07/20 9:53:00 CDT, *Est. stop date 04/07/20 9:53:00 CDT, Future Order, Days 1  Heparin Flush 100 U/mL - 5 mL: 500 units, form: Injection, IV Push, Once-chemo, *Est. first dose 04/09/20 9:53:00 CDT, *Est. stop date 04/09/20 9:53:00 CDT, Routine, Days 3, Future Order  Leucovorin (for IVPB): 695.4 mg, form: Injection, IV Piggyback, Once-chemo, Infuse over: 2 hr, *Est. first dose 04/07/20 10:13:00 CDT, *Est. stop date 04/07/20 10:13:00 CDT, Future Order, Days 1  MVASI  (for IVPB): 415.15 mg, form: Soln, IV Piggyback, Once-chemo, Infuse over: 90 minute(s), *Est. first dose 04/07/20 9:53:00 CDT, *Est. stop date 04/07/20 9:53:00 CDT, Future Order, Days  1  acetaminophen: 650 mg, form: Tab, Oral, Once-chemo, *Est. first dose 20 9:53:00 CDT, *Est. stop date 20 9:53:00 CDT, Routine, Pre-Med, Days 1, Future Order  dexamethasone (for IVPB): 10 mg, IV Piggyback, Once-chemo, Infuse over: 20 minute(s), *Est. first dose 20 9:53:00 CDT, *Est. stop date 20 9:53:00 CDT, Future Order, Days 1  fluorouracil (for IVPB) -CCA: 4,172.4 mg, IV Piggyback, Once-chemo, Infuse over: 46 hr, *Est. first dose 20 13:13:00 CDT, *Est. stop date 20 13:13:00 CDT, Future Order, Days 1  fluorouracil (for IVPB) -CCA: 695.4 mg, IV Piggyback, Once-chemo, Infuse over: 30 minute(s), *Est. first dose 20 12:13:00 CDT, *Est. stop date 20 12:13:00 CDT, Future Order, Days 1  fosaprepitant (for IVPB): 150 mg, form: Infusion, IV Piggyback, Once-chemo, Infuse over: 30 minute(s), *Est. first dose 20 9:53:00 CDT, *Est. stop date 20 9:53:00 CDT, Future Order, Days 1  oxaliplatin (for IVPB): 147.7725 mg, IV Piggyback, Once-chemo, Infuse over: 2 hr, *Est. first dose 20 10:13:00 CDT, *Est. stop date 20 10:13:00 CDT, Future Order, Days 1  Prescriptions  Prescribed  Lomotil 2.5 mg-0.025 mg oral tablet: 2 tab(s), Oral, q6hr, every 6 hours as needed till diarrhea is controlled, # 30 tab(s), 2 Refill(s), Pharmacy: [facility]/pharmacy #[id number], 152, cm, Height/Length Dosing, 20 8:47:00 CST, 90.8, kg, Weight Dosing, 20 8:47:00 CST  Phenergan 25 mg Tab: 25 mg = 1 tab(s), Oral, q6hr, # 60 tab(s), 3 Refill(s), Pharmacy: [place] [id number] [id number], Patient Education Provided, Patient Verbalizes Understanding  Zofran ODT 8 mg oral tablet, disintegratin mg tabs, Oral, q6hr, PRN PRN nausea/vomiting, # 15 tab(s), 1 Refill(s), Pharmacy: [facility]/pharmacy #[id number], 152, cm, Height/Length Dosing, 20 9:37:00 CDT, 93.1, kg, Weight Dosing, 20 9:37:00 CDT  ZyPREXA 10 mg oral tablet: See Instructions, 1 tab(s) Oral morning of and Daily for 2 days after  chemotherapy, # 18 tab(s), 0 Refill(s), [id number]  dicyclomine 20 mg oral tablet: 20 mg = 1 tab(s), Oral, QID, # 28 tab(s), 0 Refill(s), [id number], 152, cm, Height/Length Dosing, 03/26/20 9:37:00 CDT, 93.1, kg, Weight Dosing, 03/26/20 9:37:00 CDT  olanzapine 10 mg oral tablet: See Instructions, 1 tab(s) Oral morning of chemo and daily for 2 days following chemo, # 18 tab(s), 0 Refill(s), [id number], 152, cm, Height/Length Dosing, 02/18/20 8:20:00 CST, 90.8, kg, Weight Dosing, 02/18/20 8:20:00 CST  Documented Medications  Documented  Compazine: 0 Refill(s)  LISINOP/HCTZ TAB 10-12.5: 1 tab(s), Oral, Daily  Multivitamins and Minerals oral tablet: 1 tab, Oral, Daily, 0 Refill(s)  levothyroxine 25 mcg (0.025 mg) oral tablet: 1 tab, Oral, Daily, on empty stomach  ondansetron 8 mg oral tablet: 8 mg = 1 tab(s), Oral, TID   Problem list:    All Problems  Benign neoplasm of liver / SNOMED CT 4905255542 / Confirmed  Colon cancer screening / SNOMED CT 5362803553 / Confirmed  GERD - Gastro-esophageal reflux disease / SNOMED CT 9269602745 / Confirmed  Hypertension / ICD-9-.9 / Confirmed  Cancer of descending colon / SNOMED CT 926390108 / Confirmed  Morbid obesity / SNOMED CT 940665477 / Probable  Ovarian cancer / SNOMED CT 8240453705 / Confirmed  Polyp of colon / SNOMED CT 845025707 / Confirmed  Potential for infection / SNOMED CT 386867582 / Confirmed  Review of care plan / SNOMED CT 3517712029 / Confirmed  Review of care plan / SNOMED CT 1967001915 / Confirmed  Liver metastases / SNOMED CT 069386902 / Confirmed  Ovarian metastasis / SNOMED CT 422927286 / Confirmed  Valvular heart disease / SNOMED CT 3881013 / Confirmed  Inactive: Alteration in nutrition / SNOMED CT 131752022  Problem automatically updated based on documentation on Nutritional Risk Factors or  Unintentional Weight Loss.  Inactive: At risk for aspiration / SNOMED CT 8050134155  Problem automatically  updated based on documentation on Cough, Oxygen Therapy, Aspiration Risk, CN IX, X Swallowing Gag Reflex, GI Symptoms, Nutrition Risk Factors, and/or Enteral Tube Type.  Inactive: At risk for falls / SNOMED CT 463613442  Problem automatically updated based on documentation on History of Falls, History of Falls in last 3 months Florentino, Florentino Fall Risk Score and/or ADLs.  Inactive: At risk for nutritional problem / SNOMED CT 385184932  Problem automatically updated based on documentation on Nutritional Risk Factors or  Unintentional Weight Loss.  Inactive: At risk of healthcare associated infection / SNOMED CT 5335192967  Problem automatically updated based on Central IV Access Type, Arterial Line Site, PA Catheter Type, Pacemaker Type.  Inactive: At risk of pressure sore / SNOMED CT 766529626  Inactive: Bowel dysfunction / SNOMED CT 340180895  Problem added automatically by system based on documentation of Bowel Sounds as Hyperactive, Hyperactive, or Absent;  GI Symptoms as Hyperactive, Constipation, or Diarrhea and/or Passing Flatus as No.  Problem added automatically by system based on documentation of Bowel Sounds as Hyperactive, Hyperactive, or Absent;  GI Symptoms as Hyperactive, Constipation, or Diarrhea and/or Passing Flatus as No.  Inactive: Disorder of fluid balance / SNOMED CT 208071019  Problem automatically updated based on documentation on Edema, Anasarca, Edema, Generalized, Edema, Bilateral Periorbital, Edema, Face, Edema,, Bilateral Arm, Edema, Left Arm, Edema, Right Arm, Edema, Bilateral Hand, Edema, Left Hand, Edema, Right Hand, Edema, Labia, Edema, Scrotum, Edema, Penile, Edema, Bilateral Upper Leg, Edema, Bilateral Lower Leg, Edema, Bilateral Pretibial, Edema, Bilateral Ankle, Edema, Bilateral Pedal, GI Symptoms, Skin Turgor General, and/or Gestational Age Method.  Resolved: Acid reflux / SNOMED CT 505923483  Resolved: Alteration in patterns of urinary elimination / SNOMED CT 82732374  Problem  automatically updated based on documentation on Genitourinary Symptoms and/or Urinary Elimination.  Resolved: At risk of UTI / SNOMED CT 685891819  Problem automatically updated based on documentation on Urinary Elimination.  Resolved: CA - cancer of colon / SNOMED CT [id number]  Resolved: Chemotherapy / SNOMED CT 030847422  Resolved: Heartburn / SNOMED CT [id number]  Resolved: Osteoarthritis / SNOMED CT 9626340604  Resolved: Pregnant / SNOMED CT 516038383  Resolved: Pregnant / SNOMED CT 514338238  Resolved: Pregnant / SNOMED CT 579432355  Resolved: Sensitive to cold / SNOMED CT 3364235576  Resolved: Sensitive to heat / SNOMED CT [id number]  Resolved: Stress incontinence / ICD-9-.6  Resolved: Umbilical hernia / ICD-9-.1  Canceled: Radiation therapy care / SNOMED CT [id number],    Active Problems (14)  Benign neoplasm of liver   Cancer of descending colon   Colon cancer screening   GERD - Gastro-esophageal reflux disease   Hypertension   Liver metastases   Morbid obesity   Ovarian cancer   Ovarian metastasis   Polyp of colon   Potential for infection   Review of care plan   Review of care plan   Valvular heart disease         Physical Examination   Vital Signs   4/7/2020 9:20 CDT        Temperature Oral          36.6 DegC                             Temperature Oral (calculated)             97.88 DegF                             Peripheral Pulse Rate     90 bpm                             Respiratory Rate          20 br/min                             SpO2                      98 %                             Oxygen Therapy            Room air                             Systolic Blood Pressure   129 mmHg                             Diastolic Blood Pressure  85 mmHg                             Blood Pressure Location   Right arm                             Manual Cuff BP            No                             O2 SAT at rest            98 %      Measurements from flowsheet : Measurements   4/7/2020 9:20 CDT        Weight Dosing             87.4 kg                             Weight Measured           87.4 kg                             Weight Measured and Calculated in Lbs     192.68 lb                             Height/Length Dosing      152 cm                             Height/Length Measured    152 cm                             BSA Measured              1.92 m2                             Body Mass Index Measured  37.83 kg/m2     General:  Alert and oriented.         Ambulation status: Assistive devices ( Cane ).         Appearance: Overweight.         Skin: No clubbing, No edema, Not jaundiced.    Eye:  Pupils are equal, round and reactive to light, Normal conjunctiva, Vision unchanged.    HENT:  Normocephalic, Oral mucosa is moist.    Neck:  Supple, Non-tender, No lymphadenopathy.    Respiratory:  Respirations are non-labored, Breath sounds are equal, Symmetrical chest wall expansion.    Cardiovascular:  Normal rate, Regular rhythm, No edema.    Gastrointestinal:  Soft, Non-tender, Non-distended, Normal bowel sounds, Midline surgical scar   .    Genitourinary:  No costovertebral angle tenderness.    Musculoskeletal:       Mobility/ gait: Able to walk with a cane,    .    Integumentary:  Warm, Dry, Intact.    Neurologic:  Alert, Oriented, No focal deficits.    Cognition and Speech:  Speech clear and coherent.    Psychiatric:  Cooperative, Appropriate mood & affect.    ECOG Performance Scale: 1- Strenuous physical activity restricted; fully ambulatory and able to carry out light work.      Review / Management   Results review:  Lab results   4/7/2020 9:37 CDT        POC Sodium                136 mmol/L  LOW                             POC Potassium             4.0 mmol/L                             POC Chloride              101 mmol/L                             POC Ion Calcium           1.21 mmol/L                             POC Glucose                103 mg/dL                             POC BUN                   26.0 mg/dL                             POC Creatinine            1.2 mg/dL                             POC AGAP                  16.0  NA                             POC Hb                    13.9 mg/dL                             POC Hct                   41.0 %                             POC TCO2                  23.0 mmol/L  LOW    4/7/2020 9:17 CDT        WBC                       7.1 x10(3)/mcL                             RBC                       4.25 x10(6)/mcL                             Hgb                       13.7 gm/dL                             Hct                       42.5 %                             Platelet                  159 x10(3)/mcL                             MCV                       100.0 fL  HI                             MCH                       32.2 pg  HI                             MCHC                      32.2 gm/dL  LOW                             RDW                       14.8 %                             MPV                       8.8 fL  LOW                             Abs Neut                  4.01 x10(3)/mcL                             NEUT%                     56.6 %  NA                             NEUT#                     4.0 x10(3)/mcL                             LYMPH%                    31.7 %  NA                             LYMPH#                    2.2 x10(3)/mcL                             MONO%                     10.0 %  NA                             MONO#                     0.7 x10(3)/mcL                             EOS%                      1.5 %  NA                             EOS#                      0.1 x10(3)/mcL                             BASO%                     0.1 %  NA                             BASO#                     0.0 x10(3)/mcL                             [facility]            Trace  .       Impression and Plan   Diagnosis     1. 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 Patient had recurrence/mets to ovaries in 2011. See note below for more details.  KRAS mutation +  2. Recurrent metastatic dz with  dz noted in the retroperitoneum with involvement of the proximal and distal ureter status post exploratory laparotomy with resection and lysis of adhesions and primary anastomosis of left ureter performed February 22nd 2018 by Dr. Gary Pugh, along with liver metastases and mesenteric adenopathy.  3. Left hydroureteronephrosis s/p JJ stent placement during hospitalization in August 2017.   4. Treatment induced diarrhea-imrpoved since stopping chemo.   5. Repeat CT A/P on 11/1/19 with noted increase in liver lesion; restaging chest CT on 11/26/2019 showed worsening disease in her liver, no new disease in her chest..     Plan:    -She started FOLFOX and bevacizumab, cycle 1 day 1 on 3/24/2020.    -She is due for cycle 2 today.  We will dose reduce across-the-board by 5% and add Emend.    -We will have her return to clinic in 2 weeks for her treatment doctor visit for cycle 3 of treatment.    -We would plan to scan her after 4 cycles of FOLFOX and bevacizumab.      All questions were answered to the best of my ability and the patient understands the plan moving forward.    Desmond Williamson II, MD.

## 2022-04-30 NOTE — PROGRESS NOTES
Patient:   Terri Penny            MRN: [id number]            FIN: [id number]               Age:   63 years     Sex:  Female     :  1955   Associated Diagnoses:   None   Author:   Simone Devlin MD      Visit Information   PROBLEM LIST  1. 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 Patient had recurrence/mets to ovaries in . See note below for more details.  KRAS mutation +  2. Recurrent metastatic dz with  dz noted in the retroperitoneum with involvement of the proximal and distal ureter status post exploratory laparotomy with resection and lysis of adhesions and primary anastomosis of left ureter performed 2018 by Dr. Vega and Dr. Pugh.  3. Liver metastases along with mesenteric LAD.   4. Left hydroureteronephrosis s/p JJ stent placement during hospitalization in 2017.   5. Treatment induced diarrhea.  Stable  following dose reductions.      CURRENT THERAPY:  FOLFIRI + Cyramza start date 18-- q 3 weeks (for tolerance)  Due for C#16  on 19-- continue 15% dose reduction of 5FU and levoleucovorin/25% dose  reduction of Irinotecan secondary to diarrhea.   Continue with IV hydration + Octreotide on Day 3 (will give Neulasta with this cycle)      TREATMENT HISTORY:  Adjuvant XELIRI + Bevacizumab initiated 11, completed 10/2011  FLOX chemotherapy 3/17/10, completed 8/11/10    HISTORY:  Terri Penny presented to the emergency room in [facility] 2/12/10, with an obstructed colon. A colonoscopy by Dr. Brambila revealed an obstructing friable lesion at 50cm in the region of the splenic flexure and descending colon. The scope was not able to be passed beyond the obstructing lesion. Dr. Zaragoza performed a colectomy with ileostomy  placement as well as a cholecystectomy and appendectomy. Pathology revealed infiltrating moderately differentiated mucinous adenocarcinoma with tumor extension through all layers of the bowel wall and into the pericolonic fat, free surgical margins, and 0 of 46 lymph nodes. Also, acute appendicitis with perforation and a periappendiceal abscess as well as acute cholecystitis were found. The patient initiated adjuvant FLOX chemotherapy 3/17/10 and completed treatment on 8/11/10. Her ileostomy was reversed 9/22/10. At her 2/10/11 follow-up visit the CEA had increased from 1/8 to 3.5. The CEA was repeated one month later found to have increased further to 4.5. She was referred for CT scans which revealed a 10.6 x 6.5 cm left adnexal mass. Mammogram 10/25/10 was benign. MRI of the abdomen 11/8/10 indicated an indeterminate lesion post segment of the right hepatic lobe thought to be a cyst was stable compared to prior scan. CXR was clear.    Because of the possibility of a new ovarian primary versus metastatic colon carcinoma revealed in the CT, she underwent resection by Dr. 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 She was initiated on adjuvant Bevacizumab, Irinotecan, and Xeloda 5/18/11. She did not experience diarrhea but did have several days of nausea that was well controlled with Zofran. No palmar/plantar complaints. Patient completed 8 courses of treatment on 10/14/11. Tumor was found positive for KRAS mutation and unresponsive to EGFR inhibitor agents.    CT abdomen/pelvis 8/29/11 revealed no new metastatic lesion. There were 2 lesions in the liver which were present in the 2/2010 films that were very slightly enlarged.    PET/CT 11/1/11 showed no metastasis. She did have vague uptake in the terminal ileum related to the ileitis for which she was admitted to [facility] 10/29/11 and resolved.    PET/CT 2/9/2012 showed no definite  residual malignancy or metastatic disease; new mildly increased patchy FDG uptake in the L-hepatic lobe indeterminate with no corresponding CT lesion. US was diagnostic of a hemangioma.    She underwent colonoscopy 6/22/2012 which revealed 2 small adenomatous polyps with no dysplasia. Dr. Vences recommended colonoscopy in one year.    PET/CT 2/2012: no evidence of disease.  PET/CT 8/27/12: no evidence of metastatic disease with incidental cystic structure in the left hemipelvis noted on prior exams.  PET/CT 12/5/12: low density cystic mass at left anterior adnexa, probable lymphocele no increased FDG uptake. No evidence of metastatic disease.  PET/CT 3/13: no evidence of disease. CEA=2.3  CT scans 9/26/2013 with no acute findings suspicious for recurrent disease. Stable simple-appearing cystic lesion in the left hemipelvis has not changed in size measuring 7 x 4.8 cm possibly representing postoperative seroma. CEA=2.4  Repeat PET/CT 1/10/14 (following rise in CEA to 4.0) showed LENNY.   CT scan of the abdomen 04/03/2014 secondary to abdominal pain showed partial small bowel obstruction  CT 7/16/14 of C/A/P:  no evidence of new metastases.  Post operative large seroma noted measuring 19 cm, along with left pelvic fluid collection.   CT scans done January 13, 2015 show approximately 1.4 cm hypodense lesion in the posterior portion of hepatic segment 7 which is more conspicuous on the current study but unchanged in size from the prior exam.  However, no comment was made of this lesion from the radiologist's report from the prior scans in July 2014.    CEA = 4.0 with prior CEA of 3.0  Restaging CT scans done 7/14/15--> LENNY. Stable benign right posterior liver lesion measuring 1.4 cm.  CEA WNL  repeat CEA from October 2015 = 4.  Repeat CT C/A/P done 02/03/16 LENNY--->02/03/16 CEA =4  Colonoscopy done July 19, 2016 was unremarkable.  CT scans of the chest abdomen pelvis done August 5, 2016 show slightly larger fluid  collection anterior abdomen with stable hypodensity posterior aspect of the right hepatic lobe.  No new lesions.   CEA from August 5, 2016 = 6  Repeat CEA from November 1, 2016 = 7, increased slightly from August.  Repeat CT scans on February 2, 2017 shows stable posterior right hepatic lobe density measuring 1.2 cm, with stable subcutaneous fluid collection, and stable left adnexal cystic lesion.  CEA = 8  Repeat CEA from May 5, 2017 stable = 8  Patient was admitted August 6, 2017 after presenting with abdominal pain that began the day before with subsequent  development of vomiting episodes.    CT scan done August 6, 2017 at the time of presentation revealed dilated loops of small bowel likely consistent with ileus or partial obstruction.  Redemonstration of moderate left hydroureteronephrosis with obstruction at the level of the pelvic inlet adjacent to the previously described soft tissue density and cystic lesion.  The patient had undergone restaging CT scans 2 days prior in August 4, 2017 as part of her routine follow-up visit with me which revealed 4 mm nodule along the right minor fissure likely benign etiology--with stable findings compared to prior scans.  Within the abdomen and pelvis, moderate left hydronephrosis with left ureter obstructed at the level of the pelvic inlet adjacent to a 5.1 x 6.4 cm cyst along with a 3.0 x 2.1 cm soft tissue density respectively.  Radiologist comments that the soft tissue density had measured 2.8 x 2.1 cm on prior scan from February 27, 2016.  However, when looking back at the radiology report from February 2016, no mention was made of such a lesion.  Urology and surgery were consulted.  Urology placed a JJ stent on the left and Dr. Zaragoza is a general surgery opted for conservative management, with subsequent resolution of her symptoms.  At the time of this consultation August 10, 2017, she is moving her bowels and eating well without difficulty with plan to discharge  home and follow-up with Dr. Vega for potential biopsy.  She then underwent CT-guided biopsy of this lesion at [facility] ordered by Dr. Vega on August 29, 2017.  Pathology showed abdominal seroma with scattered lymphocytes and fibrin and no evidence of malignancy.  CEA from 11/14/17 = 11 (previously 8).   CT of C/A/P on 11/14 shows similar size of LLQ cystic and solid soft tissue lesions.   New/additional prominent mesenteric LN measuring 11 mm.    Improved hydronephrosis on the left.    PET/CT done November 28, 2017 shows previously described mesenteric lymph node with stable size without any appreciable FDG activity but with 2 cm focus of hypermetabolism at nearby segment of small salud with SUV uptake of l 5.4.  Additional 3 cm mass in the left lower quadrant which is hypermetabolic with SUV uptake of 11.6.  Additional 6 cm left lower quadrant cystic mass with no areas of hypermetabolism.  Patient was then referred back to Dr. Mihir Vega after her visit in late November, and then underwent exploratory laparotomy on femora 22nd 2018. She underwent extensive lysis of adhesions with resection of a retroperitoneal mass along with additional small bowel resection, as well as resection and primary anastomosis of left ureter. Pathology revealed metastatic adenocarcinoma involving the proximal and distal ureter, as well as metastatic adenocarcinoma from the primary retroperitoneal mass in addition to additional pieces of fibroadipose tissue also involved by disease.  The patient went home on February 26, 2018 and initially did well. She was readmitted to the hospital on February 28, 2018 secondary to worsening diarrhea and concern over possible obstruction. CT scans of the abdomen and pelvis were done which demonstrated multiple new hepatic mass lesions concerning for metastases. Postsurgical changes noted with small bowel loops suggestive of postoperative ileus.  The patient was managed  conservatively/expectantly and discharged home March 5, 2018 in stable condition.  She was then set up to come back to see me March 8, 2018 in clinic.  Restaging CT chest done 3/20/18 showed No evidence of mets.    CEA =25.  Iron studies c/w iron deficiency.   Patient was then started on FOLFIRI as noted above with Cyramza added with cycle 3.    Restaging CT scans of chest/abdomen/pelvis performed 7/20/18 noted persistent hepatice metastases which appear stable when compared to prior examination, postsurgical changes from prior left hemicolectomy with no evidence of residual or recurrent disease, seruma noted within the anterior soft tissues; corresponding CEA 8.0  Repeat CEA valentin 9/18/18 --4.9  Repeat CEA on 9/25/18--5.0  Repeat CEA on 10/17/18 --4.9  Restaging CT scans C/A/P done 12/3/18:   stable hypodense liver lesions noted, all uchanged, with the largest measuring 1.4 cm in segment 7, with no new or enlarging lesions elsewhere.    CEA = 4.9  Repeat CEA on 12/26 noted at 4.5  Repeat CEA on 1/17/19 noted at 4.6.   CEA on 2/5/19 = 5.1       Past medical history: Colon cancer with ovarian metastases, benign liver lesions, HTN, stress incontinence, osteoarthritis, GERD, umbilical hernia, small bowel obstruction secondary to hernia.  Procedure history: Incisional hernia repair 6/4/14, knee arthroplasty, colon resection, appendectomy, cholecystectomy, hysterectomy, colonoscopy, ileostomy and removal.   Auditory laparotomy February 22, 2018 as described above  Family history: Motherbreast cancer, fatherskin cancer  Social history: Patient lives at home with spouse. Retired. Regular diet, no exercise. Denies substance/tobacco use, never smoker.         Chief Complaint    Follow Up      Interval History   Patient presents to clinic today for scheduled follow up- currently due for Cycle #16 of treatment on 2/13/19.    Denies worsening diarrhea- symptoms stil well managed with prescribed medications along with octreotide  on day 3. Denies HA, SOB, CP, N/V/C. No melena or bright red blood per rectum. No recent fevers, chills or other signs of infection. Appetite is stable as is energy. No new mouth sores. No paresthesias. No new neurological symptoms. Labs today are stable              Review of Systems   14 point review systems done full with pertinent positives as described in interval history. Otherwise reviewed and unremarkable.      Health Status   Allergies:    Allergic Reactions (Selected)  Severity Not Documented  Percocet 2.5/325- Nausea.,    Allergies (1) Active Reaction  Percocet 2.5/325 nausea   Current medications:  (Selected)   Outpatient Medications  Ordered  Aloxi (for IVPB): 0.25 mg, IV Piggyback, Once-chemo, Infuse over: 0.3 hr, first dose 01/02/19 9:40:00 CST, stop date 01/02/19 9:40:00 CST, Days 1  Benadryl 50mg/ml Inj: 25 mg, IV Piggyback, Once-chemo, Infuse over: 20 minute(s), first dose 01/02/19 9:40:00 CST, stop date 01/02/19 9:40:00 CST, Routine, Days 1  CCA Atropine 0.4 mg/mL PF Injection: 0.25 mg, IV Piggyback, Once-chemo, Infuse over: 20 minute(s), first dose 01/02/19 9:40:00 CST, stop date 01/02/19 9:40:00 CST, Days 1  Camptosar (for IVPB): 250 mg, IV Piggyback, Once-chemo, Infuse over: 1.5 hr, first dose 01/02/19 10:00:00 CST, stop date 01/02/19 10:00:00 CST, Days 1  Fusilev (for IVPB): 300 mg, IV Piggyback, Once-chemo, Infuse over: 1.5 hr, first dose 01/02/19 10:00:00 CST, stop date 01/02/19 10:00:00 CST, Days 1  Sodium Chloride 0.9% 1000mL: form: Infusion, IV Piggyback, Once-chemo, Infuse over: 2 hr, first dose 05/11/18 12:50:00 CDT, stop date 05/11/18 12:50:00 CDT, Days 1  dexamethasone (for IVPB): 10 mg, IV Piggyback, Once-chemo, Infuse over: 20 minute(s), first dose 01/02/19 9:40:00 CST, stop date 01/02/19 9:40:00 CST, Days 1  fluorouracil (for IVPB) -CCA: 3,750 mg, IV Piggyback, Once-chemo, Infuse over: 46 hr, first dose 01/02/19 14:00:00 CST, stop date 01/02/19 14:00:00 CST, Days 1  fluorouracil  (for IVPB) -CCA: 625 mg, IV Piggyback, Once-chemo, Infuse over: 30 minute(s), first dose 19 11:30:00 CST, stop date 19 11:30:00 CST, Days 1  fosaprepitant (for IVPB): 150 mg, form: Powder-Inj, IV Piggyback, Once-chemo, Infuse over: 30 minute(s), first dose 19 9:40:00 CST, stop date 19 9:40:00 CST, Days 1  Prescriptions  Prescribed  Lomotil 2.5 mg-0.025 mg oral tablet: 2 tab(s), Oral, q6hr, every 6 hours as needed till diarrhea is controlled, # 30 tab(s), 2 Refill(s), Pharmacy: [facility]/pharmacy #[id number]  Phenergan 25 mg Tab: 25 mg = 1 tab(s), Oral, q6hr, # 60 tab(s), 3 Refill(s), Pharmacy: [place] [facility] [address] [id number], Patient Education Provided, Patient Verbalizes Understanding  Zofran 8 mg oral tablet: See Instructions, 1 tab(s) Oral BID for 3 days after chemotherapy and q8hr PRN nausea, # 60 tab(s), 1 Refill(s), Pharmacy: [place] [address] [id number]  Zofran ODT 8 mg oral tablet, disintegratin mg tabs, Oral, q6hr, PRN PRN nausea/vomiting, # 15 tab(s), 1 Refill(s), Pharmacy: [place] [address] [id number]  ZyPREXA 10 mg oral tablet: See Instructions, 1 tab(s) Oral morning of and Daily for 2 days after chemotherapy, # 18 tab(s), 0 Refill(s), Pharmacy: [facility]/pharmacy #[id number]  Documented Medications  Documented  AMOX-CLAV 875MG TABLETS: = 1 tab(s), Oral, BID  Multivitamins and Minerals oral tablet: 0 Refill(s)  hydrochlorothiazide-valsartan 12.5 mg-80 mg oral tablet: 1 tab(s), Oral, Daily, # 30 tab(s), 0 Refill(s)      Physical Examination   Vital Signs   2019 10:58 CST      Temperature Oral          36.7 DegC                             Temperature Oral (calculated)             98.06 DegF                             Peripheral Pulse Rate     77 bpm                             Systolic Blood Pressure   138 mmHg                             Diastolic Blood Pressure  84 mmHg     General:  Alert and oriented, No acute distress.    Eye:  Pupils are equal, round and reactive to light, Normal conjunctiva, Vision unchanged.     HENT:  Normocephalic, Oral mucosa is moist.    Neck:  Supple, Non-tender, No lymphadenopathy.    Respiratory:  Lungs are clear to auscultation, Respirations are non-labored, Breath sounds are equal.    Cardiovascular:  Normal rate, Regular rhythm, No edema.    Gastrointestinal:  Soft, Non-tender, Non-distended, Normal bowel sounds, Midline incision well healed   .    Genitourinary:  No inguinal tenderness.    Lymphatics:  No lymphadenopathy neck, axilla, groin.    Musculoskeletal:  No deformity, Normal gait.    Integumentary:  Warm, Dry, Intact.    Neurologic:  Alert, Oriented, No focal deficits.    Cognition and Speech:  Speech clear and coherent.    Psychiatric:  Cooperative, Appropriate mood & affect.    ECOG Performance Scale: 1- Strenuous physical activity restricted; fully ambulatory and able to carry out light work.      Review / Management   Results review:  Lab results   2/11/2019 10:51 CST      POC Hb                    13.6 mg/dL                             POC Hct                   40.0 %                             POC Sodium                138 mmol/L                             POC Potassium             4.5 mmol/L                             POC Chloride              103 mmol/L                             POC Ion Calcium           1.18 mmol/L                             POC Glucose               96 mg/dL                             POC BUN                   21.0 mg/dL                             POC Creatinine            1.1 mg/dL                             POC AGAP                  13.0  NA    2/11/2019 10:46 CST      [facility]            Negative    2/11/2019 10:37 CST      WBC                       4.2 x10(3)/mcL  LOW                             RBC                       4.00 x10(6)/mcL  LOW                             Hgb                       12.9 gm/dL                             Hct                       40.8 %                             Platelet                  256 x10(3)/mcL                              MCV                       102.0 fL  HI                             MCH                       32.3 pg  HI                             MCHC                      31.6 gm/dL  LOW                             RDW                       15.2 %                             MPV                       9.0 fL  LOW                             Abs Neut                  2.12 x10(3)/mcL                             NEUT%                     51.2 %  NA                             NEUT#                     2.1 x10(3)/mcL                             LYMPH%                    34.2 %  NA                             LYMPH#                    1.4 x10(3)/mcL                             MONO%                     12.0 %  NA                             MONO#                     0.5 x10(3)/mcL                             EOS%                      1.9 %  NA                             EOS#                      0.1 x10(3)/mcL                             BASO%                     0.7 %  NA                             BASO#                     0.0 x10(3)/mcL  .       Impression and Plan   Diagnosis     1. 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 Patient had recurrence/mets to ovaries in 2011. See note below for more details.  KRAS mutation +  2. Recurrent metastatic dz with  dz noted in the retroperitoneum with involvement of the proximal and distal ureter status post exploratory laparotomy with resection and lysis of adhesions and primary anastomosis of left ureter performed February 22nd 2018 by Dr. Gary Pugh.  3. Liver metastases along with mesenteric LAD.   4. Left hydroureteronephrosis s/p JJ stent placement during hospitalization in August 2017.   5. Treatment induced diarrhea.    Improved   .     Plan:   Will continue current regimen of FOLFIRI + Cyramza (error in previous notes regarding Zaltrap),  until dz progression or intolerance--> continue with 25% dose reduction of irinotecan, and 15% reduction of 5FU/Leukovorin   OK to proceed with cycle #16 as scheduled on 2/13/19  IV hydration and  Octreotide to be administered on Day 3, along with Neulasta 3mg SQ.   Continue PRN management of intermittent diarrhea   Continue compazine and zyprexa prn for nausea.   Maintain adequate nutrition/hydration daily   Continue with biotene/ warm water, salt, baking soda rinses TID  Continue Vitamin b complex and glutamine powder daily   RTC weekly for labs   RTC in 3 weeks for TD visit prior to Cycle #17  CBC, BMP, CEA same day lab   Tentative plan for restaging scans following cycle #17        JIMENEZ Devlin MD     .

## 2022-04-30 NOTE — PROGRESS NOTES
Patient:   Terri Penny            MRN: [id number]            FIN: [id number]               Age:   64 years     Sex:  Female     :  1955   Associated Diagnoses:   None   Author:   Chel Mcdaniels      Received a call  today at 11:25 from above patient. States she has been vomiting and with diarrhea since . Not able to keep any fluids or food down since that time. No relief with her anti-emetics and anti-diarrhea medications. Today, she feels very weak and is now having abdominal pain. Recently started on FOLFOX + Avastin chemotherapy on 3/24/20. I recommended she present at ED for evaluation. She states understanding with no further questions.